# Patient Record
Sex: FEMALE | Race: WHITE | NOT HISPANIC OR LATINO | Employment: FULL TIME | URBAN - METROPOLITAN AREA
[De-identification: names, ages, dates, MRNs, and addresses within clinical notes are randomized per-mention and may not be internally consistent; named-entity substitution may affect disease eponyms.]

---

## 2017-08-08 ENCOUNTER — ALLSCRIPTS OFFICE VISIT (OUTPATIENT)
Dept: OTHER | Facility: OTHER | Age: 27
End: 2017-08-08

## 2017-08-08 ENCOUNTER — TRANSCRIBE ORDERS (OUTPATIENT)
Dept: LAB | Facility: CLINIC | Age: 27
End: 2017-08-08

## 2017-08-08 ENCOUNTER — APPOINTMENT (OUTPATIENT)
Dept: LAB | Facility: CLINIC | Age: 27
End: 2017-08-08
Payer: COMMERCIAL

## 2017-08-08 DIAGNOSIS — Z01.84 IMMUNITY STATUS TESTING: Primary | ICD-10-CM

## 2017-08-08 DIAGNOSIS — Z01.84 ENCOUNTER FOR ANTIBODY RESPONSE EXAMINATION: ICD-10-CM

## 2017-08-08 DIAGNOSIS — N92.0 EXCESSIVE AND FREQUENT MENSTRUATION WITH REGULAR CYCLE: ICD-10-CM

## 2017-08-08 LAB
ALBUMIN SERPL BCP-MCNC: 3.6 G/DL (ref 3.5–5)
ALP SERPL-CCNC: 79 U/L (ref 46–116)
ALT SERPL W P-5'-P-CCNC: 20 U/L (ref 12–78)
ANION GAP SERPL CALCULATED.3IONS-SCNC: 9 MMOL/L (ref 4–13)
AST SERPL W P-5'-P-CCNC: 12 U/L (ref 5–45)
BACTERIA UR QL AUTO: ABNORMAL /HPF
BASOPHILS # BLD AUTO: 0.05 THOUSANDS/ΜL (ref 0–0.1)
BASOPHILS NFR BLD AUTO: 1 % (ref 0–1)
BILIRUB SERPL-MCNC: 0.22 MG/DL (ref 0.2–1)
BILIRUB UR QL STRIP: NEGATIVE
BUN SERPL-MCNC: 15 MG/DL (ref 5–25)
CALCIUM SERPL-MCNC: 8.9 MG/DL (ref 8.3–10.1)
CHLORIDE SERPL-SCNC: 106 MMOL/L (ref 100–108)
CLARITY UR: ABNORMAL
CO2 SERPL-SCNC: 23 MMOL/L (ref 21–32)
COLOR UR: YELLOW
CREAT SERPL-MCNC: 0.84 MG/DL (ref 0.6–1.3)
EOSINOPHIL # BLD AUTO: 0.15 THOUSAND/ΜL (ref 0–0.61)
EOSINOPHIL NFR BLD AUTO: 3 % (ref 0–6)
ERYTHROCYTE [DISTWIDTH] IN BLOOD BY AUTOMATED COUNT: 12.6 % (ref 11.6–15.1)
GFR SERPL CREATININE-BSD FRML MDRD: 98 ML/MIN/1.73SQ M
GLUCOSE SERPL-MCNC: 81 MG/DL (ref 65–140)
GLUCOSE UR STRIP-MCNC: NEGATIVE MG/DL
HCT VFR BLD AUTO: 37.5 % (ref 34.8–46.1)
HGB BLD-MCNC: 12.4 G/DL (ref 11.5–15.4)
HGB UR QL STRIP.AUTO: ABNORMAL
HYALINE CASTS #/AREA URNS LPF: ABNORMAL /LPF
KETONES UR STRIP-MCNC: NEGATIVE MG/DL
LEUKOCYTE ESTERASE UR QL STRIP: NEGATIVE
LYMPHOCYTES # BLD AUTO: 2.07 THOUSANDS/ΜL (ref 0.6–4.47)
LYMPHOCYTES NFR BLD AUTO: 34 % (ref 14–44)
MCH RBC QN AUTO: 29.9 PG (ref 26.8–34.3)
MCHC RBC AUTO-ENTMCNC: 33.1 G/DL (ref 31.4–37.4)
MCV RBC AUTO: 90 FL (ref 82–98)
MONOCYTES # BLD AUTO: 0.36 THOUSAND/ΜL (ref 0.17–1.22)
MONOCYTES NFR BLD AUTO: 6 % (ref 4–12)
NEUTROPHILS # BLD AUTO: 3.41 THOUSANDS/ΜL (ref 1.85–7.62)
NEUTS SEG NFR BLD AUTO: 56 % (ref 43–75)
NITRITE UR QL STRIP: NEGATIVE
NON-SQ EPI CELLS URNS QL MICRO: ABNORMAL /HPF
NRBC BLD AUTO-RTO: 0 /100 WBCS
PH UR STRIP.AUTO: 5.5 [PH] (ref 4.5–8)
PLATELET # BLD AUTO: 329 THOUSANDS/UL (ref 149–390)
PMV BLD AUTO: 10.8 FL (ref 8.9–12.7)
POTASSIUM SERPL-SCNC: 3.9 MMOL/L (ref 3.5–5.3)
PROT SERPL-MCNC: 7.8 G/DL (ref 6.4–8.2)
PROT UR STRIP-MCNC: NEGATIVE MG/DL
RBC # BLD AUTO: 4.15 MILLION/UL (ref 3.81–5.12)
RBC #/AREA URNS AUTO: ABNORMAL /HPF
RUBV IGG SERPL IA-ACNC: 64 IU/ML
SODIUM SERPL-SCNC: 138 MMOL/L (ref 136–145)
SP GR UR STRIP.AUTO: 1.02 (ref 1–1.03)
TSH SERPL DL<=0.05 MIU/L-ACNC: 1.47 UIU/ML (ref 0.36–3.74)
UROBILINOGEN UR QL STRIP.AUTO: 0.2 E.U./DL
WBC # BLD AUTO: 6.05 THOUSAND/UL (ref 4.31–10.16)
WBC #/AREA URNS AUTO: ABNORMAL /HPF

## 2017-08-08 PROCEDURE — 86706 HEP B SURFACE ANTIBODY: CPT

## 2017-08-08 PROCEDURE — 86735 MUMPS ANTIBODY: CPT

## 2017-08-08 PROCEDURE — 80053 COMPREHEN METABOLIC PANEL: CPT

## 2017-08-08 PROCEDURE — 86765 RUBEOLA ANTIBODY: CPT

## 2017-08-08 PROCEDURE — 86787 VARICELLA-ZOSTER ANTIBODY: CPT

## 2017-08-08 PROCEDURE — 81001 URINALYSIS AUTO W/SCOPE: CPT

## 2017-08-08 PROCEDURE — 87340 HEPATITIS B SURFACE AG IA: CPT

## 2017-08-08 PROCEDURE — 86762 RUBELLA ANTIBODY: CPT

## 2017-08-08 PROCEDURE — 84443 ASSAY THYROID STIM HORMONE: CPT

## 2017-08-08 PROCEDURE — 36415 COLL VENOUS BLD VENIPUNCTURE: CPT

## 2017-08-08 PROCEDURE — 86803 HEPATITIS C AB TEST: CPT

## 2017-08-08 PROCEDURE — 85025 COMPLETE CBC W/AUTO DIFF WBC: CPT

## 2017-08-09 LAB
HBV SURFACE AB SER-ACNC: >1000 MIU/ML
HBV SURFACE AG SER QL: NORMAL
HCV AB SER QL: NORMAL

## 2017-08-10 LAB
MEV IGG SER QL: NORMAL
MUV IGG SER QL: ABNORMAL
VZV IGG SER IA-ACNC: NORMAL

## 2017-08-15 ENCOUNTER — ALLSCRIPTS OFFICE VISIT (OUTPATIENT)
Dept: OTHER | Facility: OTHER | Age: 27
End: 2017-08-15

## 2017-09-14 ENCOUNTER — GENERIC CONVERSION - ENCOUNTER (OUTPATIENT)
Dept: OTHER | Facility: OTHER | Age: 27
End: 2017-09-14

## 2017-11-17 ENCOUNTER — OFFICE VISIT (OUTPATIENT)
Dept: URGENT CARE | Facility: CLINIC | Age: 27
End: 2017-11-17
Payer: COMMERCIAL

## 2017-11-17 ENCOUNTER — APPOINTMENT (OUTPATIENT)
Dept: RADIOLOGY | Facility: CLINIC | Age: 27
End: 2017-11-17
Payer: COMMERCIAL

## 2017-11-17 DIAGNOSIS — S99.912A INJURY OF LEFT ANKLE: ICD-10-CM

## 2017-11-17 PROCEDURE — G0383 LEV 4 HOSP TYPE B ED VISIT: HCPCS

## 2017-11-17 PROCEDURE — 73610 X-RAY EXAM OF ANKLE: CPT

## 2017-11-17 PROCEDURE — 99284 EMERGENCY DEPT VISIT MOD MDM: CPT

## 2017-11-23 NOTE — PROGRESS NOTES
Assessment    1  Left ankle sprain (845 00) (S96 [id number])    Plan  Injury of left ankle, initial encounter    · * XR ANKLE 3+ VIEW LEFT; Status:Active - Retrospective By Protocol Authorization; Requested for:[id number];     Discussion/Summary  Discussion Summary:   Patient placed in Ace wrap at and air cast to her left ankle  Given crutches to be nonweightbearing for the next couple of days  Start weight-bearing as tolerated  If unable to bear weight by the end of the week, follow up with foot surgeon  Elevate and ice as much as possible  Can take ibuprofen for pain and inflammation  Medication Side Effects Reviewed: Possible side effects of new medications were reviewed with the patient/guardian today  Understands and agrees with treatment plan: The treatment plan was reviewed with the patient/guardian  The patient/guardian understands and agrees with the treatment plan   Follow Up Instructions: Follow Up with your Primary Care Provider in 7 days  If your symptoms worsen, go to the nearest [place] [address] Emergency Department  Chief Complaint    1  Ankle Pain  Chief Complaint Free Text Note Form: Here with mother due to injury to left ankle today; missed last step and overturned ankle ; c/o pain and swelling; H/o of previous surgery with titanium plug in place  History of Present Illness  HPI: 27-year-old female here after twisting her left ankle today  She missed the last step and overturned right ankle  Has pain and swelling  History prior surgery and has hardware medially and laterally  Has decreased range of motion  Hospital Based Practices Required Assessment:  Pain Assessment  the patient states they have pain  The pain is located in the ankle  (on a scale of 0 to 10, the patient rates the pain at 5, at times ranging as high as 10 )  Abuse And Domestic Violence Screen   Domestic violence screen not done today   Reason DV Screen not done: mother present    Ankle Pain: CANDE EPPS presents with complaints of ankle pain  Associated symptoms include swelling,-- stiffness,-- decreased range of motion-- and-- difficulty bearing weight, but-- no redness,-- no warmth,-- no localized bruising,-- no instability,-- no fever,-- no chills-- and-- no lateral foot pain  Review of Systems  Focused-Female:  Musculoskeletal: as noted in HPI  Neurological: no complaints of headache, no confusion, no numbness or tingling, no dizziness or fainting  ROS Reviewed:   ROS reviewed  Active Problems  1  Encounter for hearing test (V72 19) (Z01 10)   2  Immunity status testing (V72 61) (Z01 84)   3  Irritable bowel syndrome with both constipation and diarrhea (564 1) (K58 2)   4  Menorrhagia (626 2) (N92 0)   5  Need for tuberculosis vaccination (V03 2) (Z23)   6  Vision test (V72 0) (Z01 00)    Past Medical History    1  History of Encounter for fetal anatomic survey (V28 81) (Z36 89)   2  History of Encounter for PPD test (V74 1) (Z11 1)   3  History of abdominal pain (V13 89) (Z87 898)   4  History of irritable bowel syndrome (V12 79) (Z87 19)   5  History of urinary tract infection (V13 02) (Z87 440)   6  History of Maternal obesity, antepartum (649 13,278 00) (O99 210)   7  History of Nausea (787 02) (R11 0)   8  History of Skin rash (782 1) (R21)   9  History of URTI (acute upper respiratory infection) (465 9) (J06 9)  Active Problems And Past Medical History Reviewed: The active problems and past medical history were reviewed and updated today  Family History  Mother    1  Family history of irritable bowel syndrome (V18 59) (Z83 79)  Grandmother    2  Family history of malignant neoplasm of ovary (V16 41) (Z80 41)  Aunt    3  Family history of malignant neoplasm of ovary (V16 41) (Z80 41)  Family History Reviewed: The family history was reviewed and updated today  Social History   · Never A Smoker  Social History Reviewed: The social history was reviewed and updated today   The social history was reviewed and is unchanged  Surgical History    1  History of  Section   2  History of Foot Surgery  Surgical History Reviewed: The surgical history was reviewed and updated today  Current Meds   1  Dicyclomine HCl - 10 MG Oral Capsule; TAKE 1 CAPSULE EVERY 6 HOURS DAILY; Therapy: [id number] to (Willy Iglesiasjennifer)  Requested for: [id number]; Last Rx:[id number] Ordered  Medication List Reviewed: The medication list was reviewed and updated today  Allergies  1  Penicillins    2  No Known Environmental Allergies   3  No Known Food Allergies    Vitals  Signs   Recorded: 90ESJ0299 01:44PM   Temperature: 99 3 F  Heart Rate: 89  Respiration: 18  Systolic: 253  Diastolic: 72  Height: 5 ft 4 in  Weight: 185 lb   BMI Calculated: 31 76  BSA Calculated: 1 89  O2 Saturation: 98  Pain Scale: 10    Physical Exam   Constitutional  General appearance: No acute distress, well appearing and well nourished  Musculoskeletal  Gait and station: Abnormal   Gait evaluation demonstrated non-weight bearing on the left  -- Left ankle with limited range of motion in all planes  Tender to palpation along lateral aspect of lateral malleolus and over ATF  Noticable swelling over lateral aspect of ankle  sensation intact        Signatures   Electronically signed by : Zane Ruiz, [facility]; 2017  2:23PM EST                       (Author)    Electronically signed by : ИРИНА De La Rosa ; 2017  1:25PM EST                       (Co-author)

## 2018-01-10 NOTE — PROGRESS NOTES
Chief Complaint  ppd      Active Problems    1  Encounter for hearing test (V72 19) (Z01 10)   2  Immunity status testing (V72 61) (Z01 84)   3  Menorrhagia (626 2) (N92 0)   4  Need for tuberculosis vaccination (V03 2) (Z23)   5  Vision test (V72 0) (Z01 00)    Current Meds   1  No Reported Medications Recorded    Allergies    1  Penicillins    2  No Known Environmental Allergies   3   No Known Food Allergies    Plan  Need for tuberculosis vaccination    · PPD    Signatures   Electronically signed by : [facility], ; Aug 15 2017  6:05PM EST                       (Author)    Electronically signed by : ELHAM Alarcon ; Aug 15 2017  8:11PM EST                       (Co-author)

## 2018-01-14 VITALS
SYSTOLIC BLOOD PRESSURE: 118 MMHG | HEART RATE: 63 BPM | TEMPERATURE: 98.3 F | BODY MASS INDEX: 31.81 KG/M2 | RESPIRATION RATE: 18 BRPM | OXYGEN SATURATION: 98 % | HEIGHT: 64 IN | DIASTOLIC BLOOD PRESSURE: 72 MMHG | WEIGHT: 186.31 LBS

## 2018-01-22 VITALS
TEMPERATURE: 98.5 F | DIASTOLIC BLOOD PRESSURE: 74 MMHG | BODY MASS INDEX: 31.58 KG/M2 | RESPIRATION RATE: 18 BRPM | HEIGHT: 64 IN | WEIGHT: 185 LBS | HEART RATE: 97 BPM | SYSTOLIC BLOOD PRESSURE: 116 MMHG | OXYGEN SATURATION: 98 %

## 2018-04-16 ENCOUNTER — TRANSCRIBE ORDERS (OUTPATIENT)
Dept: LAB | Facility: CLINIC | Age: 28
End: 2018-04-16

## 2018-04-16 ENCOUNTER — LAB (OUTPATIENT)
Dept: LAB | Facility: CLINIC | Age: 28
End: 2018-04-16
Payer: COMMERCIAL

## 2018-04-16 ENCOUNTER — OFFICE VISIT (OUTPATIENT)
Dept: INTERNAL MEDICINE CLINIC | Facility: CLINIC | Age: 28
End: 2018-04-16
Payer: COMMERCIAL

## 2018-04-16 VITALS
HEIGHT: 65 IN | DIASTOLIC BLOOD PRESSURE: 90 MMHG | OXYGEN SATURATION: 98 % | BODY MASS INDEX: 32.86 KG/M2 | SYSTOLIC BLOOD PRESSURE: 120 MMHG | WEIGHT: 197.2 LBS | HEART RATE: 64 BPM | TEMPERATURE: 98.4 F | RESPIRATION RATE: 16 BRPM

## 2018-04-16 DIAGNOSIS — F41.9 ANXIETY: Primary | ICD-10-CM

## 2018-04-16 DIAGNOSIS — F32.0 CURRENT MILD EPISODE OF MAJOR DEPRESSIVE DISORDER WITHOUT PRIOR EPISODE (HCC): ICD-10-CM

## 2018-04-16 DIAGNOSIS — F41.9 ANXIETY: ICD-10-CM

## 2018-04-16 LAB
ALBUMIN SERPL BCP-MCNC: 3.7 G/DL (ref 3.5–5)
ALP SERPL-CCNC: 82 U/L (ref 46–116)
ALT SERPL W P-5'-P-CCNC: 18 U/L (ref 12–78)
ANION GAP SERPL CALCULATED.3IONS-SCNC: 10 MMOL/L (ref 4–13)
AST SERPL W P-5'-P-CCNC: 10 U/L (ref 5–45)
BASOPHILS # BLD AUTO: 0.03 THOUSANDS/ΜL (ref 0–0.1)
BASOPHILS NFR BLD AUTO: 0 % (ref 0–1)
BILIRUB SERPL-MCNC: 0.18 MG/DL (ref 0.2–1)
BUN SERPL-MCNC: 19 MG/DL (ref 5–25)
CALCIUM SERPL-MCNC: 8.7 MG/DL (ref 8.3–10.1)
CHLORIDE SERPL-SCNC: 108 MMOL/L (ref 100–108)
CO2 SERPL-SCNC: 22 MMOL/L (ref 21–32)
CREAT SERPL-MCNC: 0.7 MG/DL (ref 0.6–1.3)
EOSINOPHIL # BLD AUTO: 0.24 THOUSAND/ΜL (ref 0–0.61)
EOSINOPHIL NFR BLD AUTO: 3 % (ref 0–6)
ERYTHROCYTE [DISTWIDTH] IN BLOOD BY AUTOMATED COUNT: 12.2 % (ref 11.6–15.1)
GFR SERPL CREATININE-BSD FRML MDRD: 121 ML/MIN/1.73SQ M
GLUCOSE P FAST SERPL-MCNC: 95 MG/DL (ref 65–99)
HCT VFR BLD AUTO: 39.2 % (ref 34.8–46.1)
HGB BLD-MCNC: 13 G/DL (ref 11.5–15.4)
LYMPHOCYTES # BLD AUTO: 2.71 THOUSANDS/ΜL (ref 0.6–4.47)
LYMPHOCYTES NFR BLD AUTO: 31 % (ref 14–44)
MCH RBC QN AUTO: 29.7 PG (ref 26.8–34.3)
MCHC RBC AUTO-ENTMCNC: 33.2 G/DL (ref 31.4–37.4)
MCV RBC AUTO: 90 FL (ref 82–98)
MONOCYTES # BLD AUTO: 0.47 THOUSAND/ΜL (ref 0.17–1.22)
MONOCYTES NFR BLD AUTO: 5 % (ref 4–12)
NEUTROPHILS # BLD AUTO: 5.37 THOUSANDS/ΜL (ref 1.85–7.62)
NEUTS SEG NFR BLD AUTO: 61 % (ref 43–75)
NRBC BLD AUTO-RTO: 0 /100 WBCS
PLATELET # BLD AUTO: 335 THOUSANDS/UL (ref 149–390)
PMV BLD AUTO: 10.6 FL (ref 8.9–12.7)
POTASSIUM SERPL-SCNC: 3.9 MMOL/L (ref 3.5–5.3)
PROT SERPL-MCNC: 8 G/DL (ref 6.4–8.2)
RBC # BLD AUTO: 4.37 MILLION/UL (ref 3.81–5.12)
SODIUM SERPL-SCNC: 140 MMOL/L (ref 136–145)
TSH SERPL DL<=0.05 MIU/L-ACNC: 1.75 UIU/ML (ref 0.36–3.74)
WBC # BLD AUTO: 8.84 THOUSAND/UL (ref 4.31–10.16)

## 2018-04-16 PROCEDURE — 84443 ASSAY THYROID STIM HORMONE: CPT

## 2018-04-16 PROCEDURE — 99214 OFFICE O/P EST MOD 30 MIN: CPT | Performed by: NURSE PRACTITIONER

## 2018-04-16 PROCEDURE — 85025 COMPLETE CBC W/AUTO DIFF WBC: CPT

## 2018-04-16 PROCEDURE — 80053 COMPREHEN METABOLIC PANEL: CPT

## 2018-04-16 PROCEDURE — 36415 COLL VENOUS BLD VENIPUNCTURE: CPT

## 2018-04-16 RX ORDER — DICYCLOMINE HCL 20 MG
20 TABLET ORAL
COMMUNITY
Start: 2017-09-08 | End: 2019-05-29

## 2018-04-16 RX ORDER — ESCITALOPRAM OXALATE 10 MG/1
10 TABLET ORAL DAILY
Qty: 30 TABLET | Refills: 0 | Status: SHIPPED | OUTPATIENT
Start: 2018-04-16 | End: 2018-05-14 | Stop reason: DRUGHIGH

## 2018-04-16 NOTE — PROGRESS NOTES
Assessment/Plan:  Patient to start on Lexapro 10 mg daily  She was instructed the side effects and was told if any vomiting or suicidal thoughts to call the office  She was advised to practice safe sex and to avoid getting pregnant while on this medication  Will ordering fasting labs to have done today  Will bring back in one month  Patient does contract for safety today in the office  No problem-specific Assessment & Plan notes found for this encounter  Problem List Items Addressed This Visit     Anxiety - Primary    Relevant Medications    escitalopram (LEXAPRO) 10 mg tablet    Other Relevant Orders    Comprehensive metabolic panel    CBC and differential    TSH, 3rd generation with T4 reflex    Current mild episode of major depressive disorder without prior episode (HCC)    Relevant Medications    escitalopram (LEXAPRO) 10 mg tablet    Other Relevant Orders    Comprehensive metabolic panel    CBC and differential    TSH, 3rd generation with T4 reflex            Subjective:      Patient ID: Milton Castle is a 24 y o  female  Greg Bernabet is here today for an acute visit  She states for the past several months she has been having episodes of anxiety and having depression  She states some days she can not get out of bed and just wants to sleep all day  She does have occasional palpitations but denies any chest pain or SOB  She states she is no longer with her children's father and did have her best friend and Grandmother pass away all within in the past 6 months  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 and she feels with the stress of school and her home life it is time to get some help  She is not planning on having any children anytime soon and is not breastfeeding  She denies any suicidal or homicidal ideations  She is eating and drinking but some days she does go without eating due to being in bed not wanting to get up  She offers no other concerns today           The following portions of the patient's history were reviewed and updated as appropriate:   She  has a past medical history of Irritable bowel syndrome and Maternal obesity, antepartum  She   Patient Active Problem List    Diagnosis Date Noted    Anxiety 2018    Current mild episode of major depressive disorder without prior episode ([facility] [address] ) 2018     She  has a past surgical history that includes  section and Foot surgery  Her family history includes Irritable bowel syndrome in her mother; Ovarian cancer in her family and family  She  reports that she has never smoked  She has never used smokeless tobacco  She reports that she does not drink alcohol or use drugs  Current Outpatient Prescriptions   Medication Sig Dispense Refill    dicyclomine (BENTYL) 20 mg tablet Take 20 mg by mouth      escitalopram (LEXAPRO) 10 mg tablet Take 1 tablet (10 mg total) by mouth daily 30 tablet 0     No current facility-administered medications for this visit  No current outpatient prescriptions on file prior to visit  No current facility-administered medications on file prior to visit  She is allergic to penicillin g     Review of Systems   Constitutional: Negative  HENT: Negative  Eyes: Negative  Respiratory: Negative  Cardiovascular: Negative  Gastrointestinal: Negative  Endocrine: Negative  Genitourinary: Negative  Musculoskeletal: Negative  Skin: Negative  Allergic/Immunologic: Negative  Neurological: Negative  Hematological: Negative  Psychiatric/Behavioral: The patient is nervous/anxious  Objective:      /90 (BP Location: Right arm, Patient Position: Sitting, Cuff Size: Standard)   Pulse 64   Temp 98 4 °F (36 9 °C) (Oral)   Resp 16   Ht 5' 5" (1 651 m)   Wt 89 4 kg (197 lb 3 2 oz)   SpO2 98%   BMI 32 82 kg/m²          Physical Exam   Constitutional: She is oriented to person, place, and time  She appears well-developed and well-nourished     HENT:   Head: Normocephalic and atraumatic  Right Ear: External ear normal    Left Ear: External ear normal    Nose: Nose normal    Mouth/Throat: Oropharynx is clear and moist    Eyes: Conjunctivae and EOM are normal  Pupils are equal, round, and reactive to light  Neck: Normal range of motion  Neck supple  Cardiovascular: Normal rate, regular rhythm, normal heart sounds and intact distal pulses  Pulmonary/Chest: Effort normal and breath sounds normal    Abdominal: Bowel sounds are normal    Musculoskeletal: Normal range of motion  Neurological: She is alert and oriented to person, place, and time  She has normal reflexes  Skin: Skin is warm and dry  Psychiatric: She has a normal mood and affect  Her behavior is normal  Judgment and thought content normal    Vitals reviewed

## 2018-04-16 NOTE — PATIENT INSTRUCTIONS
Anxiety   WHAT YOU NEED TO KNOW:   What do I need to know about anxiety? Anxiety is a condition that causes you to feel extremely worried or nervous  The feelings are so strong that they can cause problems with your daily activities or sleep  Anxiety may be triggered by something you fear, or it may happen without a cause  Family or work stress, smoking, caffeine, and alcohol can increase your risk for anxiety  Certain medicines or health conditions can also increase your risk  Anxiety can become a long-term condition if it is not managed or treated  What other common signs and symptoms may occur with anxiety? · Fatigue or muscle tightness     · Shaking, restlessness, or irritability     · Problems focusing     · Trouble sleeping     · Feeling jumpy, easily startled, or dizzy     · Rapid heartbeat or shortness of breath  What do I need to tell my healthcare provider about my anxiety? Tell your healthcare provider when your symptoms began and what triggers them  Tell your provider if anxiety affects your daily activities  Your provider will also ask about your medical history and if you have family members with a similar condition  Tell your provider about your past and present alcohol, nicotine, or drug use  What can I do to manage anxiety? You may get medicines to help you feel calm and relaxed, and to decrease your symptoms  Medicines are usually given together with therapy or other treatments  The following can help you manage anxiety:  · Talk to someone about your anxiety  Your healthcare provider may suggest counseling  Cognitive behavioral therapy can help you understand and change how you react to events that trigger your symptoms  You might feel more comfortable talking with a friend or family member about your anxiety  Choose someone you know will be supportive and encouraging  · Find ways to relax  Activities such as exercise, meditation, or listening to music can help you relax   Spend time with friends, or do things you enjoy  · Practice deep breathing  Deep breathing can help you relax when you feel anxious  Focus on taking slow, deep breaths several times a day, or during an anxiety attack  Breathe in through your nose and out through your mouth  · Create a regular sleep routine  Regular sleep can help you feel calmer during the day  Go to sleep and wake up at the same times every day  Do not watch television or use the computer right before bed  Your room should be comfortable, dark, and quiet  · Eat a variety of healthy foods  Healthy foods include fruits, vegetables, low-fat dairy products, lean meats, fish, whole-grain breads, and cooked beans  Healthy foods can help you feel less anxious and have more energy  · Exercise regularly  Exercise can increase your energy level  Exercise may also lift your mood and help you sleep better  Your healthcare provider can help you create an exercise plan  · Do not smoke  Nicotine and other chemicals in cigarettes and cigars can increase anxiety  Ask your healthcare provider for information if you currently smoke and need help to quit  E-cigarettes or smokeless tobacco still contain nicotine  Talk to your healthcare provider before you use these products  · Do not have caffeine  Caffeine can make your symptoms worse  Do not have foods or drinks that are meant to increase your energy level  · Limit or do not drink alcohol  Ask your healthcare provider if alcohol is safe for you  You may not be able to drink alcohol if you take certain anxiety or depression medicines  Limit alcohol to 1 drink per day if you are a woman  Limit alcohol to 2 drinks per day if you are a man  A drink of alcohol is 12 ounces of beer, 5 ounces of wine, or 1½ ounces of liquor  · Do not use drugs  Drugs can make your anxiety worse  It can also make anxiety hard to manage  Talk to your healthcare provider if you use drugs and want help to quit    Call 911 if:   · You have chest pain, tightness, or heaviness that may spread to your shoulders, arms, jaw, neck, or back  · You feel like hurting yourself or someone else  When should I contact my healthcare provider? · Your symptoms get worse or do not get better with treatment  · Your anxiety keeps you from doing your regular daily activities  · You have new symptoms since your last visit  · You have questions or concerns about your condition or care  CARE AGREEMENT:   You have the right to help plan your care  Learn about your health condition and how it may be treated  Discuss treatment options with your caregivers to decide what care you want to receive  You always have the right to refuse treatment  The above information is an  only  It is not intended as medical advice for individual conditions or treatments  Talk to your doctor, nurse or pharmacist before following any medical regimen to see if it is safe and effective for you  © 2017 [employer] Information is for End User's use only and may not be sold, redistributed or otherwise used for commercial purposes  All illustrations and images included in CareNotes® are the copyrighted property of A D A M [employer] Inc  or [employer] Gil

## 2018-05-14 ENCOUNTER — OFFICE VISIT (OUTPATIENT)
Dept: INTERNAL MEDICINE CLINIC | Facility: CLINIC | Age: 28
End: 2018-05-14
Payer: COMMERCIAL

## 2018-05-14 VITALS
HEIGHT: 65 IN | SYSTOLIC BLOOD PRESSURE: 116 MMHG | DIASTOLIC BLOOD PRESSURE: 86 MMHG | HEART RATE: 91 BPM | TEMPERATURE: 98 F | BODY MASS INDEX: 32.84 KG/M2 | OXYGEN SATURATION: 97 % | WEIGHT: 197.1 LBS

## 2018-05-14 DIAGNOSIS — F32.0 CURRENT MILD EPISODE OF MAJOR DEPRESSIVE DISORDER WITHOUT PRIOR EPISODE (HCC): Primary | ICD-10-CM

## 2018-05-14 PROBLEM — K58.2 IRRITABLE BOWEL SYNDROME WITH BOTH CONSTIPATION AND DIARRHEA: Status: ACTIVE | Noted: 2017-09-14

## 2018-05-14 PROCEDURE — 99213 OFFICE O/P EST LOW 20 MIN: CPT | Performed by: NURSE PRACTITIONER

## 2018-05-14 RX ORDER — ESCITALOPRAM OXALATE 20 MG/1
20 TABLET ORAL DAILY
Qty: 30 TABLET | Refills: 3 | Status: SHIPPED | OUTPATIENT
Start: 2018-05-14 | End: 2018-08-01 | Stop reason: DRUGHIGH

## 2018-05-14 NOTE — PROGRESS NOTES
Assessment/Plan: Will increase her Lexapro to 20 mg daily  She was advised if any issues while taking this to call the office  She was advised to try taking the medication at night rather than during the day  Patient will call the office if any issues or if she feels the medication is not working  No problem-specific Assessment & Plan notes found for this encounter  Problem List Items Addressed This Visit     Current mild episode of major depressive disorder without prior episode ([facility] ) - Primary    Relevant Medications    escitalopram (LEXAPRO) 20 mg tablet            Subjective:      Patient ID: Darrian Friend is a 24 y o  female  Brook Ennis is here today for a follow up visit  She was started on Lexapro on  and has been taking 10 mg daily  She states she is feeling a little better but is still having some symptoms of feeling tired during the day and is not sleeping at night  She feel her insomnia is related to her schooling and stress  She states she would like to continue her medication but would like to increase the dose to 20 mg  She denies any nausea, vomiting, or diarrhea  She denies any suicidal thoughts or homicidal thoughts  She offers no other complaints today  The following portions of the patient's history were reviewed and updated as appropriate:   She  has a past medical history of Irritable bowel syndrome and Maternal obesity, antepartum  She   Patient Active Problem List    Diagnosis Date Noted    Anxiety 2018    Current mild episode of major depressive disorder without prior episode ([facility] ) 2018    Irritable bowel syndrome with both constipation and diarrhea 2017     She  has a past surgical history that includes  section and Foot surgery  Her family history includes Irritable bowel syndrome in her mother; Ovarian cancer in her family and family  She  reports that she has never smoked   She has never used smokeless tobacco  She reports that she does not drink alcohol or use drugs  Current Outpatient Prescriptions   Medication Sig Dispense Refill    dicyclomine (BENTYL) 20 mg tablet Take 20 mg by mouth      escitalopram (LEXAPRO) 20 mg tablet Take 1 tablet (20 mg total) by mouth daily 30 tablet 3     No current facility-administered medications for this visit  Current Outpatient Prescriptions on File Prior to Visit   Medication Sig    dicyclomine (BENTYL) 20 mg tablet Take 20 mg by mouth    [DISCONTINUED] escitalopram (LEXAPRO) 10 mg tablet Take 1 tablet (10 mg total) by mouth daily     No current facility-administered medications on file prior to visit  She is allergic to penicillin g     Review of Systems   All other systems reviewed and are negative  Objective:      /86 (BP Location: Right arm, Patient Position: Sitting, Cuff Size: Adult)   Pulse 91   Temp 98 °F (36 7 °C) (Temporal)   Ht 5' 5" (1 651 m)   Wt 89 4 kg (197 lb 1 6 oz)   SpO2 97%   BMI 32 80 kg/m²          Physical Exam   Constitutional: She is oriented to person, place, and time  She appears well-developed and well-nourished  HENT:   Head: Normocephalic and atraumatic  Right Ear: External ear normal    Left Ear: External ear normal    Nose: Nose normal    Mouth/Throat: Oropharynx is clear and moist    Eyes: Conjunctivae and EOM are normal  Pupils are equal, round, and reactive to light  Neck: Normal range of motion  Neck supple  Cardiovascular: Normal rate, regular rhythm, normal heart sounds and intact distal pulses  Pulmonary/Chest: Effort normal and breath sounds normal    Abdominal: Soft  Bowel sounds are normal    Musculoskeletal: Normal range of motion  Neurological: She is alert and oriented to person, place, and time  She has normal reflexes  Skin: Skin is warm and dry  Psychiatric: She has a normal mood and affect  Her behavior is normal  Judgment and thought content normal    Vitals reviewed

## 2018-08-01 ENCOUNTER — OFFICE VISIT (OUTPATIENT)
Dept: INTERNAL MEDICINE CLINIC | Facility: CLINIC | Age: 28
End: 2018-08-01
Payer: COMMERCIAL

## 2018-08-01 VITALS
SYSTOLIC BLOOD PRESSURE: 112 MMHG | DIASTOLIC BLOOD PRESSURE: 78 MMHG | HEART RATE: 89 BPM | OXYGEN SATURATION: 98 % | HEIGHT: 65 IN | BODY MASS INDEX: 33.14 KG/M2 | TEMPERATURE: 98.9 F | WEIGHT: 198.9 LBS

## 2018-08-01 DIAGNOSIS — F41.9 ANXIETY AND DEPRESSION: Primary | ICD-10-CM

## 2018-08-01 DIAGNOSIS — L74.9 SWEATING ABNORMALITY: ICD-10-CM

## 2018-08-01 DIAGNOSIS — F32.A ANXIETY AND DEPRESSION: Primary | ICD-10-CM

## 2018-08-01 PROCEDURE — 3008F BODY MASS INDEX DOCD: CPT | Performed by: NURSE PRACTITIONER

## 2018-08-01 PROCEDURE — 99214 OFFICE O/P EST MOD 30 MIN: CPT | Performed by: NURSE PRACTITIONER

## 2018-08-01 NOTE — PATIENT INSTRUCTIONS
Sertraline (By mouth)   Sertraline (SER-tra-james)  Treats depression, obsessive-compulsive disorder (OCD), posttraumatic stress disorder (PTSD), premenstrual dysphoric disorder (PMDD), social anxiety disorder, and panic disorder  This medicine is an SSRI  Brand Name(s): Zoloft   There may be other brand names for this medicine  When This Medicine Should Not Be Used: This medicine is not right for everyone  Do not use it if you had an allergic reaction to sertraline  How to Use This Medicine:   Liquid, Tablet  · Take your medicine as directed  Your dose may need to be changed several times to find what works best for you  You may need to take it for a few weeks or months before you feel better  · Oral liquid: Use the dropper provided to remove the medicine and mix it with 1/2 cup (4 ounces) of water, ginger ale, lemon-lime soda, lemonade, or orange juice  Drink the mixture right away  It is normal for it to look a bit hazy  · This medicine should come with a Medication Guide  Ask your pharmacist for a copy if you do not have one  · Missed dose: Take a dose as soon as you remember  If it is almost time for your next dose, wait until then and take a regular dose  Do not take extra medicine to make up for a missed dose  · Store the medicine in a closed container at room temperature, away from heat, moisture, and direct light  Drugs and Foods to Avoid:   Ask your doctor or pharmacist before using any other medicine, including over-the-counter medicines, vitamins, and herbal products  · Do not use this medicine together with pimozide  Do not use this medicine and an MAO inhibitor (MAOI) within 14 days of each other  Do not use the oral liquid form of sertraline if you are also using disulfiram   · Some medicines can affect how sertraline works   Tell your doctor if you are using the following:   ¨ Buspirone, cimetidine, cisapride, diazepam, digitoxin, fentanyl, flecainide, lithium, phenytoin, propafenone, [place] Bruce's wort, tramadol, tryptophan supplements, or valproate  ¨ A blood thinner (such as warfarin), a diuretic (water pill), an NSAID pain or arthritis medicine (such as aspirin, diclofenac, ibuprofen), a tricyclic antidepressant, a triptan medicine for migraine headaches  · Do not drink alcohol while you are using this medicine  Warnings While Using This Medicine:   · Tell your doctor if you are pregnant or breastfeeding, or if you have liver disease, bleeding problems, glaucoma, heart disease, or a seizure disorder  · For some children, teenagers, and young adults, this medicine may increase mental or emotional problems  This may lead to thoughts of suicide and violence  Talk with your doctor right away if you have any thoughts or behavior changes that concern you  Tell your doctor if you or anyone in your family has a history of bipolar disorder or suicide attempts  · This medicine may cause the following problems:   ¨ Serotonin syndrome (when taken with certain medicines)  ¨ Low sodium levels (more common in elderly patients and those who take diuretics or become dehydrated)  · Tell your doctor if you are sensitive to latex, because the oral liquid comes with a latex rubber dropper  · This medicine may make you dizzy or drowsy  Do not drive or do anything that could be dangerous until you know how this medicine affects you  · Do not stop using this medicine suddenly  Your doctor will need to slowly decrease your dose before you stop it completely  · Your doctor will check your progress and the effects of this medicine at regular visits  Keep all appointments  · Keep all medicine out of the reach of children  Never share your medicine with anyone    Possible Side Effects While Using This Medicine:   Call your doctor right away if you notice any of these side effects:  · Allergic reaction: Itching or hives, swelling in your face or hands, swelling or tingling in your mouth or throat, chest tightness, trouble breathing  · Anxiety, restlessness, fast heartbeat, fever, sweating, muscle spasms, twitching, nausea, vomiting, diarrhea, seeing or hearing things that are not there  · Blistering, peeling, or red skin rash  · Confusion, weakness, and muscle twitching  · Eye pain, vision changes, seeing halos around lights  · Feeling more excited or energetic than usual  · Thoughts of hurting yourself or others, unusual behavior  · Unusual bleeding or bruising  If you notice these less serious side effects, talk with your doctor:   · Dry mouth  · Loss of appetite, weight loss  · Mild diarrhea, constipation, nausea, vomiting  · Sexual problems  · Sleepiness, or trouble sleeping  If you notice other side effects that you think are caused by this medicine, tell your doctor  Call your doctor for medical advice about side effects  You may report side effects to FDA at 2-557-FDA-1845  © 2017 [address] [employer] Information is for End User's use only and may not be sold, redistributed or otherwise used for commercial purposes  The above information is an  only  It is not intended as medical advice for individual conditions or treatments  Talk to your doctor, nurse or pharmacist before following any medical regimen to see if it is safe and effective for you

## 2018-08-01 NOTE — PROGRESS NOTES
Assessment/Plan: Patient was advised how to wean off Lexapro  Once off will start her on Zoloft 50 mg daily  She contracts for safety today in the office  Will refer her to Psychiatry and [facility]  Will also refer her to Dermatology regarding her profuse sweating  Will bring her back in around 2 months or sooner if need be  No problem-specific Assessment & Plan notes found for this encounter  Problem List Items Addressed This Visit     Anxiety and depression - Primary    Relevant Medications    sertraline (ZOLOFT) 50 mg tablet    Sweating abnormality    Relevant Orders    Ambulatory referral to Dermatology            Subjective:      Patient ID: Emely Dee is a 24 y o  female  Tracy Floyd is here today for an acute visit  She is taking Lexapro 20 mg daily for her anxiety and depression  She states she just recently did graduate from [employer] and she feels her anxiety and depression is getting much worse  She states she will have periods where her heart is racing and she can not shut her mind off  She would like to see Psychiatry and counseling  She also would like to start on something else for her symptoms  She is also having issues with profuse sweating which has been going on since puberty  She states her sweating happens when she is not doing anything and it will be her whole body  She otherwise offers no other complaints  The following portions of the patient's history were reviewed and updated as appropriate:   She  has a past medical history of Irritable bowel syndrome and Maternal obesity, antepartum    She   Patient Active Problem List    Diagnosis Date Noted    Anxiety and depression 08/01/2018    Sweating abnormality 08/01/2018    Anxiety 04/16/2018    Current mild episode of major depressive disorder without prior episode ([facility] [address] ) 04/16/2018    Irritable bowel syndrome with both constipation and diarrhea 09/14/2017     She  has a past surgical history that includes  section and Foot surgery  Her family history includes Irritable bowel syndrome in her mother; Ovarian cancer in her family and family  She  reports that she has never smoked  She has never used smokeless tobacco  She reports that she does not drink alcohol or use drugs  Current Outpatient Prescriptions   Medication Sig Dispense Refill    dicyclomine (BENTYL) 20 mg tablet Take 20 mg by mouth      sertraline (ZOLOFT) 50 mg tablet Take 1 tablet (50 mg total) by mouth daily 30 tablet 3     No current facility-administered medications for this visit  Current Outpatient Prescriptions on File Prior to Visit   Medication Sig    dicyclomine (BENTYL) 20 mg tablet Take 20 mg by mouth    [DISCONTINUED] escitalopram (LEXAPRO) 20 mg tablet Take 1 tablet (20 mg total) by mouth daily     No current facility-administered medications on file prior to visit  She is allergic to penicillin g     Review of Systems   Constitutional: Negative  HENT: Negative  Eyes: Negative  Respiratory: Negative  Gastrointestinal: Negative  Endocrine: Negative  Genitourinary: Negative  Musculoskeletal: Negative  Skin: Negative  Allergic/Immunologic: Negative  Neurological: Negative  Hematological: Negative  Psychiatric/Behavioral: The patient is nervous/anxious  Below is the patient's most recent value for Albumin, ALT, AST, BUN, Calcium, Chloride, Cholesterol, CO2, Creatinine, GFR, Glucose, HDL, Hematocrit, Hemoglobin, Hemoglobin A1C, LDL, Magnesium, Phosphorus, Platelets, Potassium, PSA, Sodium, Triglycerides, and WBC     Lab Results   Component Value Date    ALT 18 2018    AST 10 2018    BUN 19 2018    CALCIUM 8 7 2018     2018    CO2 22 2018    CREATININE 0 70 2018    HCT 39 2 2018    HGB 13 0 2018     2018    K 3 9 2018     2018    WBC 8 84 2018     Note: for a comprehensive list of the patient's lab results, access the Results Review activity  Objective:      /78 (BP Location: Right arm, Patient Position: Sitting, Cuff Size: Adult)   Pulse 89   Temp 98 9 °F (37 2 °C) (Temporal)   Ht 5' 5" (1 651 m)   Wt 90 2 kg (198 lb 14 4 oz)   SpO2 98%   BMI 33 10 kg/m²          Physical Exam   Constitutional: She is oriented to person, place, and time  She appears well-developed and well-nourished  HENT:   Head: Normocephalic and atraumatic  Right Ear: External ear normal    Left Ear: External ear normal    Nose: Nose normal    Mouth/Throat: Oropharynx is clear and moist    Eyes: Conjunctivae and EOM are normal  Pupils are equal, round, and reactive to light  Neck: Normal range of motion  Neck supple  Cardiovascular: Normal rate, regular rhythm, normal heart sounds and intact distal pulses  Pulmonary/Chest: Effort normal and breath sounds normal    Abdominal: Soft  Bowel sounds are normal    Musculoskeletal: Normal range of motion  Neurological: She is alert and oriented to person, place, and time  She has normal reflexes  Skin: Skin is warm and dry  Psychiatric: She has a normal mood and affect  Her behavior is normal  Judgment and thought content normal    Vitals reviewed

## 2018-09-04 ENCOUNTER — TELEPHONE (OUTPATIENT)
Dept: INTERNAL MEDICINE CLINIC | Facility: CLINIC | Age: 28
End: 2018-09-04

## 2018-09-04 NOTE — TELEPHONE ENCOUNTER
No it should not be an issue does she want me to order an HCG blood test to determine if she could possibly be pregnant

## 2018-09-05 DIAGNOSIS — N91.2 AMENORRHEA: Primary | ICD-10-CM

## 2018-10-04 ENCOUNTER — APPOINTMENT (OUTPATIENT)
Dept: LAB | Facility: CLINIC | Age: 28
End: 2018-10-04
Payer: COMMERCIAL

## 2018-10-04 ENCOUNTER — OFFICE VISIT (OUTPATIENT)
Dept: INTERNAL MEDICINE CLINIC | Facility: CLINIC | Age: 28
End: 2018-10-04
Payer: COMMERCIAL

## 2018-10-04 ENCOUNTER — TRANSCRIBE ORDERS (OUTPATIENT)
Dept: LAB | Facility: CLINIC | Age: 28
End: 2018-10-04

## 2018-10-04 VITALS
DIASTOLIC BLOOD PRESSURE: 82 MMHG | HEIGHT: 65 IN | WEIGHT: 191.1 LBS | HEART RATE: 95 BPM | OXYGEN SATURATION: 98 % | SYSTOLIC BLOOD PRESSURE: 120 MMHG | BODY MASS INDEX: 31.84 KG/M2 | TEMPERATURE: 98.7 F

## 2018-10-04 DIAGNOSIS — R61 GENERALIZED HYPERHIDROSIS: ICD-10-CM

## 2018-10-04 DIAGNOSIS — F41.9 ANXIETY AND DEPRESSION: Primary | ICD-10-CM

## 2018-10-04 DIAGNOSIS — R61 HYPERHIDROSIS: ICD-10-CM

## 2018-10-04 DIAGNOSIS — Z23 NEED FOR INFLUENZA VACCINATION: ICD-10-CM

## 2018-10-04 DIAGNOSIS — R61 GENERALIZED HYPERHIDROSIS: Primary | ICD-10-CM

## 2018-10-04 DIAGNOSIS — F32.A ANXIETY AND DEPRESSION: Primary | ICD-10-CM

## 2018-10-04 PROBLEM — N92.0 MENORRHAGIA: Status: ACTIVE | Noted: 2017-08-08

## 2018-10-04 LAB — TSH SERPL DL<=0.05 MIU/L-ACNC: 1.26 UIU/ML (ref 0.36–3.74)

## 2018-10-04 PROCEDURE — 36415 COLL VENOUS BLD VENIPUNCTURE: CPT

## 2018-10-04 PROCEDURE — 1036F TOBACCO NON-USER: CPT | Performed by: NURSE PRACTITIONER

## 2018-10-04 PROCEDURE — 90471 IMMUNIZATION ADMIN: CPT | Performed by: NURSE PRACTITIONER

## 2018-10-04 PROCEDURE — 90688 IIV4 VACCINE SPLT 0.5 ML IM: CPT | Performed by: NURSE PRACTITIONER

## 2018-10-04 PROCEDURE — 99214 OFFICE O/P EST MOD 30 MIN: CPT | Performed by: NURSE PRACTITIONER

## 2018-10-04 PROCEDURE — 84443 ASSAY THYROID STIM HORMONE: CPT

## 2018-10-04 RX ORDER — BUSPIRONE HYDROCHLORIDE 7.5 MG/1
TABLET ORAL
Qty: 60 TABLET | Refills: 0 | Status: SHIPPED | OUTPATIENT
Start: 2018-10-04 | End: 2019-02-05 | Stop reason: DRUGHIGH

## 2018-10-04 NOTE — PATIENT INSTRUCTIONS
Buspirone (By mouth)   Buspirone (sok-ILUM-rouz)  Treats anxiety  Brand Name(s):   There may be other brand names for this medicine  When This Medicine Should Not Be Used: You should not use this medicine if you have had an allergic reaction to buspirone  How to Use This Medicine:   Tablet  · Your doctor will tell you how much of this medicine to take and how often  Do not take more medicine or take it more often than your doctor tells you to  · You may take this medicine with or without food, but take it the same way each time  · You may need to take this medicine for 1 or 2 weeks before you begin to feel better  If a dose is missed:   · If you miss a dose or forget to take your medicine, take it as soon as you can  If it is almost time for your next dose, wait until then to take the medicine and skip the missed dose  · Do not use extra medicine to make up for a missed dose  How to Store and Dispose of This Medicine:   · Store the medicine at room temperature, away from heat, moisture, and direct light  · Keep all medicine out of the reach of children and never share your medicine with anyone  Drugs and Foods to Avoid:   Ask your doctor or pharmacist before using any other medicine, including over-the-counter medicines, vitamins, and herbal products  · You should not use buspirone when you are also using an MAO inhibitor (such as Eldepryl®, Marplan®, Nardil®, Parnate®)  · Do not eat grapefruit, drink grapefruit juice, or drink alcohol while you are using buspirone  · Make sure your doctor knows if you are also using cimetidine (Manueld Horse), dexamethasone (Decadron®), diltiazem (Angelic Matias), erythromycin (Erythro-Tab®), itraconazole (Sporanox®), nefazodone (Serzone®), rifampin (Rifadin®, Rifamate®, Rifater®), verapamil (Alean Child), or medicine for seizures (such as Dilantin®, Luminal®, Tegretol®)    · Make sure your doctor knows if you are using any medicines that make you sleepy (such as sleeping pills, cold and allergy medicine, narcotic pain relievers, or sedatives)  Warnings While Using This Medicine:   · Make sure your doctor knows if you are pregnant or breastfeeding, or if you have kidney or liver disease  · Do not stop using this medicine suddenly without asking your doctor  You may need to slowly decrease your dose before stopping it completely  · This medicine may make you dizzy or drowsy  Avoid driving, using machines, or doing anything else that could be dangerous if you are not alert  Possible Side Effects While Using This Medicine:   Call your doctor right away if you notice any of these side effects:  · Fast or pounding heartbeat  · Numbness or tingling feeling  · Tremors or shaking  If you notice these less serious side effects, talk with your doctor:   · Drowsiness or weakness  · Dry mouth  · Feeling restless or nervous, trouble sleeping  · Headache  · Nausea, constipation, upset stomach  If you notice other side effects that you think are caused by this medicine, tell your doctor  Call your doctor for medical advice about side effects  You may report side effects to FDA at 3-631-FDA-3415  © 2017 [facility] Information is for End User's use only and may not be sold, redistributed or otherwise used for commercial purposes  The above information is an  only  It is not intended as medical advice for individual conditions or treatments  Talk to your doctor, nurse or pharmacist before following any medical regimen to see if it is safe and effective for you

## 2018-10-04 NOTE — PROGRESS NOTES
Assessment/Plan: Patient to continue Zoloft 50 mg daily and will start on Buspar 7 5 mg by mouth BID for her anxiety  She was advised if any side effects are worsening of anxiety while on this to call the office  Will give numbers to [facility]iza Guevara and [facility] for her continued anxiety  She does contract for safety today in the office  Patient for Flu vaccine today  Will bring back in one month to see how she is tolerating this medication  No problem-specific Assessment & Plan notes found for this encounter  Problem List Items Addressed This Visit     Anxiety and depression - Primary    Relevant Medications    busPIRone (BUSPAR) 7 5 mg tablet    Hyperhidrosis      Other Visit Diagnoses     Need for influenza vaccination        Relevant Orders    MULTI-DOSE VIAL: influenza vaccine, 3225-0552, quadrivalent, 0 5 mL, for patients 3+ yr (FLUZONE)            Subjective:      Patient ID: Jaylen Foley is a 24 y o  female  Arcelia Plummerat is here today for a follow up visit  She is taking Zoloft for her anxiety and depression and states her depression is doing much better but her anxiety seems to be worse  She has not yet followed up with counseling but would like to in the near future  She states she feels like she is on the verge of a panic attack and at times feels very jittery  She is recently in a new relationship and does have 4 children  She states her last relationship was not a good one and she is constantly worried her new boyfriend will cheat  She did follow up with Dermatology and they did diagnose her with hyperhidrosis  She was started on Drysol and does follow back up with them in 6 weeks  She offers no other issues  The following portions of the patient's history were reviewed and updated as appropriate:   She  has a past medical history of Irritable bowel syndrome and Maternal obesity, antepartum    She   Patient Active Problem List    Diagnosis Date Noted    Hyperhidrosis 10/04/2018    Anxiety and depression 2018    Sweating abnormality 2018    Anxiety 2018    Current mild episode of major depressive disorder without prior episode ([facility] [address] ) 2018    Irritable bowel syndrome with both constipation and diarrhea 2017    Menorrhagia 2017     She  has a past surgical history that includes  section and Foot surgery  Her family history includes Irritable bowel syndrome in her mother; Ovarian cancer in her family and family  She  reports that she has never smoked  She has never used smokeless tobacco  She reports that she does not drink alcohol or use drugs  Current Outpatient Prescriptions   Medication Sig Dispense Refill    aluminum chloride (DRYSOL) 20 % external solution Apply topically daily at bedtime      dicyclomine (BENTYL) 20 mg tablet Take 20 mg by mouth      sertraline (ZOLOFT) 50 mg tablet Take 1 tablet (50 mg total) by mouth daily 30 tablet 3    busPIRone (BUSPAR) 7 5 mg tablet Take one tablet by mouth every 12 hours 60 tablet 0     No current facility-administered medications for this visit  Current Outpatient Prescriptions on File Prior to Visit   Medication Sig    dicyclomine (BENTYL) 20 mg tablet Take 20 mg by mouth    sertraline (ZOLOFT) 50 mg tablet Take 1 tablet (50 mg total) by mouth daily     No current facility-administered medications on file prior to visit  She is allergic to penicillin g     Review of Systems   Constitutional: Negative  HENT: Negative  Eyes: Negative  Respiratory: Negative  Cardiovascular: Negative  Gastrointestinal: Negative  Endocrine: Negative  Genitourinary: Negative  Musculoskeletal: Negative  Allergic/Immunologic: Negative  Neurological: Negative  Hematological: Negative  Psychiatric/Behavioral: The patient is nervous/anxious            Objective:      /82 (BP Location: Right arm, Patient Position: Sitting, Cuff Size: Adult)   Pulse 95   Temp 98 7 °F (37 1 °C) (Temporal)   Ht 5' 5" (1 651 m)   Wt 86 7 kg (191 lb 1 6 oz)   SpO2 98%   BMI 31 80 kg/m²          Physical Exam   Constitutional: She is oriented to person, place, and time  She appears well-developed and well-nourished  HENT:   Head: Normocephalic and atraumatic  Right Ear: External ear normal    Left Ear: External ear normal    Nose: Nose normal    Mouth/Throat: Oropharynx is clear and moist    Eyes: Pupils are equal, round, and reactive to light  Conjunctivae and EOM are normal    Neck: Normal range of motion  Neck supple  Cardiovascular: Normal rate, regular rhythm, normal heart sounds and intact distal pulses  Pulmonary/Chest: Effort normal and breath sounds normal    Abdominal: Soft  Bowel sounds are normal    Musculoskeletal: Normal range of motion  Neurological: She is alert and oriented to person, place, and time  She has normal reflexes  Skin: Skin is warm and dry  Psychiatric: She has a normal mood and affect  Her behavior is normal  Judgment and thought content normal    Vitals reviewed

## 2018-10-29 ENCOUNTER — TELEPHONE (OUTPATIENT)
Dept: INTERNAL MEDICINE CLINIC | Facility: CLINIC | Age: 28
End: 2018-10-29

## 2018-10-29 DIAGNOSIS — B37.3 YEAST INFECTION OF THE VAGINA: Primary | ICD-10-CM

## 2018-10-29 RX ORDER — FLUCONAZOLE 150 MG/1
TABLET ORAL
Qty: 2 TABLET | Refills: 0 | Status: SHIPPED | OUTPATIENT
Start: 2018-10-29 | End: 2018-10-31

## 2018-10-29 NOTE — TELEPHONE ENCOUNTER
Patient called the office and stated that she had a yeast infection last week and tried the monistat OTC but didn't help  Patient is asking if we can call in Luciana to [facility]  Patient states that she doesn't have any allergies to any medications  Per our conversation that it is okay to send in for the patient

## 2019-02-05 ENCOUNTER — OFFICE VISIT (OUTPATIENT)
Dept: INTERNAL MEDICINE CLINIC | Facility: CLINIC | Age: 29
End: 2019-02-05
Payer: COMMERCIAL

## 2019-02-05 VITALS
DIASTOLIC BLOOD PRESSURE: 74 MMHG | OXYGEN SATURATION: 98 % | TEMPERATURE: 98.4 F | HEART RATE: 85 BPM | WEIGHT: 203.8 LBS | HEIGHT: 65 IN | BODY MASS INDEX: 33.95 KG/M2 | SYSTOLIC BLOOD PRESSURE: 116 MMHG

## 2019-02-05 DIAGNOSIS — Z00.00 ROUTINE HEALTH MAINTENANCE: Primary | ICD-10-CM

## 2019-02-05 DIAGNOSIS — R61 HYPERHIDROSIS: ICD-10-CM

## 2019-02-05 DIAGNOSIS — F32.A ANXIETY AND DEPRESSION: ICD-10-CM

## 2019-02-05 DIAGNOSIS — F41.9 ANXIETY AND DEPRESSION: ICD-10-CM

## 2019-02-05 PROCEDURE — 99395 PREV VISIT EST AGE 18-39: CPT | Performed by: NURSE PRACTITIONER

## 2019-02-05 RX ORDER — ALPRAZOLAM 0.25 MG/1
TABLET ORAL
Qty: 30 TABLET | Refills: 0 | Status: SHIPPED | OUTPATIENT
Start: 2019-02-05 | End: 2019-12-04 | Stop reason: SDUPTHER

## 2019-02-05 NOTE — PATIENT INSTRUCTIONS
Wellness Visit for Adults   WHAT YOU NEED TO KNOW:   What is a wellness visit? A wellness visit is when you see your healthcare provider to get screened for health problems  You can also get advice on how to stay healthy  Write down your questions so you remember to ask them  Ask your healthcare provider how often you should have a wellness visit  What happens at a wellness visit? Your healthcare provider will ask about your health, and your family history of health problems  This includes high blood pressure, heart disease, and cancer  He or she will ask if you have symptoms that concern you, if you smoke, and about your mood  You may also be asked about your intake of medicines, supplements, food, and alcohol  Any of the following may be done:  · Your weight  will be checked  Your height may also be checked so your body mass index (BMI) can be calculated  Your BMI shows if you are at a healthy weight  · Your blood pressure  and heart rate will be checked  Your temperature may also be checked  · Blood and urine tests  may be done  Blood tests may be done to check your cholesterol levels  Abnormal cholesterol levels increase your risk for heart disease and stroke  You may also need a blood or urine test to check for diabetes if you are at increased risk  Urine tests may be done to look for signs of an infection or kidney disease  · A physical exam  includes checking your heartbeat and lungs with a stethoscope  Your healthcare provider may also check your skin to look for sun damage  · Screening tests  may be recommended  A screening test is done to check for diseases that may not cause symptoms  The screening tests you may need depend on your age, gender, family history, and lifestyle habits  For example, colorectal screening may be recommended if you are 48years old or older  What screening tests do I need if I am a woman? · A Pap smear  is used to screen for cervical cancer   Pap smears are usually done every 3 to 5 years depending on your age  You may need them more often if you have had abnormal Pap smear test results in the past  Ask your healthcare provider how often you should have a Pap smear  · A mammogram  is an x-ray of your breasts to screen for breast cancer  Experts recommend mammograms every 2 years starting at age 48 years  You may need a mammogram at age 52 years or younger if you have an increased risk for breast cancer  Talk to your healthcare provider about when you should start having mammograms and how often you need them  What vaccines might I need? · Get an influenza vaccine  every year  The influenza vaccine protects you from the flu  Several types of viruses cause the flu  The viruses change over time, so new vaccines are made each year  · Get a tetanus-diphtheria (Td) booster vaccine  every 10 years  This vaccine protects you against tetanus and diphtheria  Tetanus is a severe infection that may cause painful muscle spasms and lockjaw  Diphtheria is a severe bacterial infection that causes a thick covering in the back of your mouth and throat  · Get a human papillomavirus (HPV) vaccine  if you are female and aged 23 to 32 or male 23 to 24 and never received it  This vaccine protects you from HPV infection  HPV is the most common infection spread by sexual contact  HPV may also cause vaginal, penile, and anal cancers  · Get a pneumococcal vaccine  if you are aged 72 years or older  The pneumococcal vaccine is an injection given to protect you from pneumococcal disease  Pneumococcal disease is an infection caused by pneumococcal bacteria  The infection may cause pneumonia, meningitis, or an ear infection  · Get a shingles vaccine  if you are aged 61 or older, even if you have had shingles before  The shingles vaccine is an injection to protect you from the varicella-zoster virus  This is the same virus that causes chickenpox   Shingles is a painful rash that develops in people who had chickenpox or have been exposed to the virus  How can I eat healthy? My Plate is a model for planning healthy meals  It shows the types and amounts of foods that should go on your plate  Fruits and vegetables make up about half of your plate, and grains and protein make up the other half  A serving of dairy is included on the side of your plate  The amount of calories and serving sizes you need depends on your age, gender, weight, and height  Examples of healthy foods are listed below:  · Eat a variety of vegetables  such as dark green, red, and orange vegetables  You can also include canned vegetables low in sodium (salt) and frozen vegetables without added butter or sauces  · Eat a variety of fresh fruits , canned fruit in 100% juice, frozen fruit, and dried fruit  · Include whole grains  At least half of the grains you eat should be whole grains  Examples include whole-wheat bread, wheat pasta, brown rice, and whole-grain cereals such as oatmeal     · Eat a variety of protein foods such as seafood (fish and shellfish), lean meat, and poultry without skin (turkey and chicken)  Examples of lean meats include pork leg, shoulder, or tenderloin, and beef round, sirloin, tenderloin, and extra lean ground beef  Other protein foods include eggs and egg substitutes, beans, peas, soy products, nuts, and seeds  · Choose low-fat dairy products such as skim or 1% milk or low-fat yogurt, cheese, and cottage cheese  · Limit unhealthy fats  such as butter, hard margarine, and shortening  How much exercise do I need? Exercise at least 30 minutes per day on most days of the week  Some examples of exercise include walking, biking, dancing, and swimming  You can also fit in more physical activity by taking the stairs instead of the elevator or parking farther away from stores  Include muscle strengthening activities 2 days each week  Regular exercise provides many health benefits  It helps you manage your weight, and decreases your risk for type 2 diabetes, heart disease, stroke, and high blood pressure  Exercise can also help improve your mood  Ask your healthcare provider about the best exercise plan for you  What are some general health and safety guidelines I should follow? · Do not smoke  Nicotine and other chemicals in cigarettes and cigars can cause lung damage  Ask your healthcare provider for information if you currently smoke and need help to quit  E-cigarettes or smokeless tobacco still contain nicotine  Talk to your healthcare provider before you use these products  · Limit alcohol  A drink of alcohol is 12 ounces of beer, 5 ounces of wine, or 1½ ounces of liquor  · Lose weight, if needed  Being overweight increases your risk of certain health conditions  These include heart disease, high blood pressure, type 2 diabetes, and certain types of cancer  · Protect your skin  Do not sunbathe or use tanning beds  Use sunscreen with a SPF 15 or higher  Apply sunscreen at least 15 minutes before you go outside  Reapply sunscreen every 2 hours  Wear protective clothing, hats, and sunglasses when you are outside  · Drive safely  Always wear your seatbelt  Make sure everyone in your car wears a seatbelt  A seatbelt can save your life if you are in an accident  Do not use your cell phone when you are driving  This could distract you and cause an accident  Pull over if you need to make a call or send a text message  · Practice safe sex  Use latex condoms if are sexually active and have more than one partner  Your healthcare provider may recommend screening tests for sexually transmitted infections (STIs)  · Wear helmets, lifejackets, and protective gear  Always wear a helmet when you ride a bike or motorcycle, go skiing, or play sports that could cause a head injury  Wear protective equipment when you play sports   Wear a lifejacket when you are on a boat or doing water sports  CARE AGREEMENT:   You have the right to help plan your care  Learn about your health condition and how it may be treated  Discuss treatment options with your caregivers to decide what care you want to receive  You always have the right to refuse treatment  The above information is an  only  It is not intended as medical advice for individual conditions or treatments  Talk to your doctor, nurse or pharmacist before following any medical regimen to see if it is safe and effective for you  © 2017 [facility] Information is for End User's use only and may not be sold, redistributed or otherwise used for commercial purposes  All illustrations and images included in CareNotes® are the copyrighted property of A D A M [employer] Inc  or [employer] Gil

## 2019-02-05 NOTE — PROGRESS NOTES
Assessment/Plan: Will order labs to have done in one year  Patient is up to date on her screenings and vaccines  Will give Xanax 0 25 mg by mouth once daily as needed  She was advised this will only be short term and to take strictly as needed  Inquiry ran and consistent with prescribing history  Will follow up in one year or sooner if need be  No problem-specific Assessment & Plan notes found for this encounter  Problem List Items Addressed This Visit     Anxiety and depression    Relevant Medications    ALPRAZolam (XANAX) 0 25 mg tablet    Other Relevant Orders    Comprehensive metabolic panel    CBC and differential    TSH, 3rd generation with Free T4 reflex    Hyperhidrosis    Relevant Orders    TSH, 3rd generation with Free T4 reflex    Routine health maintenance - Primary    Relevant Orders    Comprehensive metabolic panel    CBC and differential    TSH, 3rd generation with Free T4 reflex            Subjective:      Patient ID: Richard Elam is a 34 y o  female  Ada Christina is here today for a routine health physical   She is doing well today  She is taking Zoloft and Buspar for anxiety and depression but was having issues getting her Buspar filled  She states no where did have it and was wondering if she can try something else  She states she would just like something strictly as needed  She denies any suicidal or homicidal ideations  She is up to date on her screenings  She does follow up with Dr Risa Recinos for GYN  She is getting her menses monthly and her flow is regular  She denies any chest pain, SOB, or palpitations  She denies any tobacco, alcohol, or drug use  She states her IBS has been controlled  She does follow up with Dermatology for her Hyperhidrosis and is using Drysol which is helping  She offers no other issues          The following portions of the patient's history were reviewed and updated as appropriate:   She  has a past medical history of Irritable bowel syndrome and Maternal obesity, antepartum  She   Patient Active Problem List    Diagnosis Date Noted    Routine health maintenance 2019    Hyperhidrosis 10/04/2018    Anxiety and depression 2018    Sweating abnormality 2018    Anxiety 2018    Current mild episode of major depressive disorder without prior episode ([facility] [address] ) 2018    Irritable bowel syndrome with both constipation and diarrhea 2017    Menorrhagia 2017     She  has a past surgical history that includes  section and Foot surgery  Her family history includes Irritable bowel syndrome in her mother; Ovarian cancer in her family and family  She  reports that she has never smoked  She has never used smokeless tobacco  She reports that she does not drink alcohol or use drugs  Current Outpatient Prescriptions   Medication Sig Dispense Refill    aluminum chloride (DRYSOL) 20 % external solution Apply topically daily at bedtime      dicyclomine (BENTYL) 20 mg tablet Take 20 mg by mouth      sertraline (ZOLOFT) 50 mg tablet Take 1 tablet (50 mg total) by mouth daily 30 tablet 3    ALPRAZolam (XANAX) 0 25 mg tablet Take one tablet by mouth once daily as needed 30 tablet 0     No current facility-administered medications for this visit  Current Outpatient Prescriptions on File Prior to Visit   Medication Sig    aluminum chloride (DRYSOL) 20 % external solution Apply topically daily at bedtime    dicyclomine (BENTYL) 20 mg tablet Take 20 mg by mouth    sertraline (ZOLOFT) 50 mg tablet Take 1 tablet (50 mg total) by mouth daily    [DISCONTINUED] busPIRone (BUSPAR) 7 5 mg tablet Take one tablet by mouth every 12 hours (Patient not taking: Reported on 2019 )     No current facility-administered medications on file prior to visit  She is allergic to penicillin g     Review of Systems   All other systems reviewed and are negative        Below is the patient's most recent value for Albumin, ALT, AST, BUN, Calcium, Chloride, Cholesterol, CO2, Creatinine, GFR, Glucose, HDL, Hematocrit, Hemoglobin, Hemoglobin A1C, LDL, Magnesium, Phosphorus, Platelets, Potassium, PSA, Sodium, Triglycerides, and WBC  Lab Results   Component Value Date    ALT 18 04/16/2018    AST 10 04/16/2018    BUN 19 04/16/2018    CALCIUM 8 7 04/16/2018     04/16/2018    CO2 22 04/16/2018    CREATININE 0 70 04/16/2018    HCT 39 2 04/16/2018    HGB 13 0 04/16/2018     04/16/2018    K 3 9 04/16/2018    WBC 8 84 04/16/2018     Note: for a comprehensive list of the patient's lab results, access the Results Review activity  Objective:      /74 (BP Location: Right arm, Patient Position: Sitting, Cuff Size: Large)   Pulse 85   Temp 98 4 °F (36 9 °C) (Temporal)   Ht 5' 5" (1 651 m)   Wt 92 4 kg (203 lb 12 8 oz)   SpO2 98%   BMI 33 91 kg/m²          Physical Exam   Constitutional: She is oriented to person, place, and time  She appears well-developed and well-nourished  HENT:   Head: Normocephalic and atraumatic  Right Ear: External ear normal    Left Ear: External ear normal    Nose: Nose normal    Mouth/Throat: Oropharynx is clear and moist    Eyes: Pupils are equal, round, and reactive to light  Conjunctivae and EOM are normal    Neck: Normal range of motion  Neck supple  Cardiovascular: Normal rate, regular rhythm, normal heart sounds and intact distal pulses  Pulmonary/Chest: Effort normal and breath sounds normal    Abdominal: Soft  Bowel sounds are normal    Musculoskeletal: Normal range of motion  Neurological: She is alert and oriented to person, place, and time  She has normal reflexes  Skin: Skin is warm and dry  Psychiatric: She has a normal mood and affect  Her behavior is normal  Judgment and thought content normal    Vitals reviewed

## 2019-05-29 ENCOUNTER — OFFICE VISIT (OUTPATIENT)
Dept: INTERNAL MEDICINE CLINIC | Facility: CLINIC | Age: 29
End: 2019-05-29
Payer: COMMERCIAL

## 2019-05-29 VITALS
HEIGHT: 65 IN | DIASTOLIC BLOOD PRESSURE: 68 MMHG | BODY MASS INDEX: 35.4 KG/M2 | OXYGEN SATURATION: 98 % | SYSTOLIC BLOOD PRESSURE: 100 MMHG | HEART RATE: 80 BPM | WEIGHT: 212.5 LBS | TEMPERATURE: 98.1 F

## 2019-05-29 DIAGNOSIS — G44.009 CLUSTER HEADACHE, NOT INTRACTABLE, UNSPECIFIED CHRONICITY PATTERN: Primary | ICD-10-CM

## 2019-05-29 PROBLEM — Z00.00 ROUTINE HEALTH MAINTENANCE: Status: RESOLVED | Noted: 2019-02-05 | Resolved: 2019-05-29

## 2019-05-29 PROCEDURE — 99213 OFFICE O/P EST LOW 20 MIN: CPT | Performed by: NURSE PRACTITIONER

## 2019-05-29 RX ORDER — SUMATRIPTAN 50 MG/1
50 TABLET, FILM COATED ORAL ONCE AS NEEDED
Qty: 9 TABLET | Refills: 0 | Status: SHIPPED | OUTPATIENT
Start: 2019-05-29 | End: 2020-02-26

## 2019-06-04 ENCOUNTER — HOSPITAL ENCOUNTER (EMERGENCY)
Facility: HOSPITAL | Age: 29
Discharge: HOME | End: 2019-06-04
Attending: INTERNAL MEDICINE
Payer: COMMERCIAL

## 2019-06-04 VITALS
RESPIRATION RATE: 20 BRPM | OXYGEN SATURATION: 99 % | SYSTOLIC BLOOD PRESSURE: 123 MMHG | DIASTOLIC BLOOD PRESSURE: 74 MMHG | BODY MASS INDEX: 35.41 KG/M2 | TEMPERATURE: 98.3 F | HEART RATE: 68 BPM | HEIGHT: 65 IN | WEIGHT: 212.52 LBS

## 2019-06-04 DIAGNOSIS — G43.909 MIGRAINE HEADACHE: Primary | ICD-10-CM

## 2019-06-04 PROCEDURE — 99283 EMERGENCY DEPT VISIT LOW MDM: CPT

## 2019-06-04 PROCEDURE — 96375 TX/PRO/DX INJ NEW DRUG ADDON: CPT

## 2019-06-04 PROCEDURE — 96361 HYDRATE IV INFUSION ADD-ON: CPT

## 2019-06-04 PROCEDURE — 96374 THER/PROPH/DIAG INJ IV PUSH: CPT

## 2019-06-04 RX ORDER — ONDANSETRON 4 MG/1
4 TABLET, ORALLY DISINTEGRATING ORAL EVERY 8 HOURS PRN
Qty: 20 TABLET | Refills: 0 | Status: SHIPPED | OUTPATIENT
Start: 2019-06-04 | End: 2020-02-26

## 2019-06-04 RX ORDER — KETOROLAC TROMETHAMINE 30 MG/ML
30 INJECTION, SOLUTION INTRAMUSCULAR; INTRAVENOUS ONCE
Status: COMPLETED | OUTPATIENT
Start: 2019-06-04 | End: 2019-06-04

## 2019-06-04 RX ORDER — ONDANSETRON 2 MG/ML
4 INJECTION INTRAMUSCULAR; INTRAVENOUS ONCE
Status: COMPLETED | OUTPATIENT
Start: 2019-06-04 | End: 2019-06-04

## 2019-06-04 RX ADMIN — SODIUM CHLORIDE 1000 ML: 0.9 INJECTION, SOLUTION INTRAVENOUS at 17:35

## 2019-06-04 RX ADMIN — ONDANSETRON 4 MG: 2 INJECTION INTRAMUSCULAR; INTRAVENOUS at 17:40

## 2019-06-04 RX ADMIN — KETOROLAC TROMETHAMINE 30 MG: 30 INJECTION, SOLUTION INTRAMUSCULAR; INTRAVENOUS at 17:40

## 2019-06-06 ENCOUNTER — OFFICE VISIT (OUTPATIENT)
Dept: INTERNAL MEDICINE CLINIC | Facility: CLINIC | Age: 29
End: 2019-06-06
Payer: COMMERCIAL

## 2019-06-06 VITALS
HEART RATE: 82 BPM | HEIGHT: 65 IN | DIASTOLIC BLOOD PRESSURE: 78 MMHG | WEIGHT: 211.5 LBS | TEMPERATURE: 98 F | OXYGEN SATURATION: 99 % | BODY MASS INDEX: 35.24 KG/M2 | SYSTOLIC BLOOD PRESSURE: 102 MMHG

## 2019-06-06 DIAGNOSIS — G43.901 MIGRAINE WITH STATUS MIGRAINOSUS, NOT INTRACTABLE, UNSPECIFIED MIGRAINE TYPE: Primary | ICD-10-CM

## 2019-06-06 PROCEDURE — 99213 OFFICE O/P EST LOW 20 MIN: CPT | Performed by: NURSE PRACTITIONER

## 2019-06-06 PROCEDURE — 1036F TOBACCO NON-USER: CPT | Performed by: NURSE PRACTITIONER

## 2019-06-06 PROCEDURE — 3008F BODY MASS INDEX DOCD: CPT | Performed by: NURSE PRACTITIONER

## 2019-11-13 ENCOUNTER — TELEPHONE (OUTPATIENT)
Dept: INTERNAL MEDICINE CLINIC | Facility: CLINIC | Age: 29
End: 2019-11-13

## 2019-11-13 ENCOUNTER — OFFICE VISIT (OUTPATIENT)
Dept: URGENT CARE | Facility: CLINIC | Age: 29
End: 2019-11-13
Payer: COMMERCIAL

## 2019-11-13 VITALS
WEIGHT: 212 LBS | BODY MASS INDEX: 36.19 KG/M2 | RESPIRATION RATE: 18 BRPM | OXYGEN SATURATION: 98 % | SYSTOLIC BLOOD PRESSURE: 118 MMHG | HEIGHT: 64 IN | DIASTOLIC BLOOD PRESSURE: 70 MMHG | TEMPERATURE: 98 F | HEART RATE: 74 BPM

## 2019-11-13 DIAGNOSIS — M94.0 COSTOCHONDRITIS: Primary | ICD-10-CM

## 2019-11-13 PROCEDURE — G0382 LEV 3 HOSP TYPE B ED VISIT: HCPCS | Performed by: PHYSICIAN ASSISTANT

## 2019-11-13 PROCEDURE — 99203 OFFICE O/P NEW LOW 30 MIN: CPT | Performed by: PHYSICIAN ASSISTANT

## 2019-11-13 PROCEDURE — 99283 EMERGENCY DEPT VISIT LOW MDM: CPT | Performed by: PHYSICIAN ASSISTANT

## 2019-11-13 RX ORDER — METHYLPREDNISOLONE 4 MG/1
TABLET ORAL
Qty: 1 EACH | Refills: 0 | Status: SHIPPED | OUTPATIENT
Start: 2019-11-13 | End: 2020-02-16 | Stop reason: ALTCHOICE

## 2019-11-13 NOTE — PROGRESS NOTES
[facility]        NAME: Judith Griffin is a 34 y o  female  : 1990    MRN: [id number]  DATE: 2019  TIME: 2:52 PM    Assessment and Plan   Costochondritis [M94 0]  1  Costochondritis  methylPREDNISolone 4 MG tablet therapy pack         Patient Instructions     May use heat/ice as tolerated as well  Follow up with PCP in 3-5 days  Proceed to  ER if symptoms worsen  Chief Complaint     Chief Complaint   Patient presents with    Muscle Pain     Cough started 2 weeks ago and with the cough developed chest and back spasm type discomfort  History of Present Illness       35 y/o F presents for eval of reproducible chest and back pain  Pt has had a cough x2 weeks and states the constant coughing precipitated the pain - pain began approx 1 week after upper respiratory infection/coughing began  She states cough and URI eventually resolved but she still has the pain  It goes through to her back and sides occasionally and is worse with movement  She denies any N/V, dyspnea, wheezing, fever, chills, abd pain  No pertinent pmhx  No recent travel  Review of Systems   Review of Systems   All other systems reviewed and are negative          Current Medications       Current Outpatient Medications:     ALPRAZolam (XANAX) 0 25 mg tablet, Take one tablet by mouth once daily as needed (Patient not taking: Reported on 2019), Disp: 30 tablet, Rfl: 0    aluminum chloride (DRYSOL) 20 % external solution, Apply topically daily at bedtime, Disp: , Rfl:     methylPREDNISolone 4 MG tablet therapy pack, Use as directed on package, Disp: 1 each, Rfl: 0    ondansetron (ZOFRAN-ODT) 4 mg disintegrating tablet, Take 1 tablet (4 mg total) by mouth every 8 (eight) hours as needed for nausea or vomiting (Patient not taking: Reported on 2019), Disp: 20 tablet, Rfl: 0    SUMAtriptan (IMITREX) 50 mg tablet, Take 1 tablet (50 mg total) by mouth once as needed for migraine for up to 1 dose (Patient not taking: Reported on 2019), Disp: 9 tablet, Rfl: 0    Current Allergies     Allergies as of 2019 - Reviewed 2019   Allergen Reaction Noted    Penicillin g Hives 2016            The following portions of the patient's history were reviewed and updated as appropriate: allergies, current medications, past family history, past medical history, past social history, past surgical history and problem list      Past Medical History:   Diagnosis Date    Irritable bowel syndrome     last assessed: 10/23/2015    Maternal obesity, antepartum     last assessed: 2017    Migraine        Past Surgical History:   Procedure Laterality Date     SECTION      FOOT SURGERY         Family History   Problem Relation Age of Onset    Irritable bowel syndrome Mother     Ovarian cancer Family     Ovarian cancer Family          Medications have been verified  Objective   /70   Pulse 74   Temp 98 °F (36 7 °C) (Tympanic)   Resp 18   Ht 5' 4" (1 626 m)   Wt 96 2 kg (212 lb)   LMP 10/27/2019   SpO2 98%   BMI 36 39 kg/m²        Physical Exam     Physical Exam   Constitutional: She is oriented to person, place, and time  She appears well-developed and well-nourished  No distress  HENT:   Head: Normocephalic and atraumatic  Right Ear: Hearing, tympanic membrane, external ear and ear canal normal    Left Ear: Hearing, tympanic membrane, external ear and ear canal normal    Nose: Nose normal    Mouth/Throat: Uvula is midline, oropharynx is clear and moist and mucous membranes are normal    Eyes: Pupils are equal, round, and reactive to light  Conjunctivae and EOM are normal    Cardiovascular: Normal rate and regular rhythm  Exam reveals no gallop and no friction rub  No murmur heard  Pulmonary/Chest: Effort normal and breath sounds normal  No respiratory distress  She has no wheezes  She has no rales  She exhibits no crepitus, no deformity and no swelling  Neurological: She is alert and oriented to person, place, and time  Skin: Skin is warm and dry

## 2019-11-13 NOTE — TELEPHONE ENCOUNTER
Patient called stating that she has had a cough for a couple of weeks and now she thinks from all of the coughing that she is having muscle spasms causing pain in chest and back  It happens now and then and  patient stated that she knows it is not her heart because of all of the coughing she has been doing  I offered patient several appointments but she works from [employer]    To 7 p m  Therefore she is going to Urgent Care

## 2019-12-04 DIAGNOSIS — F32.A ANXIETY AND DEPRESSION: ICD-10-CM

## 2019-12-04 DIAGNOSIS — F41.9 ANXIETY AND DEPRESSION: ICD-10-CM

## 2019-12-04 RX ORDER — ALPRAZOLAM 0.25 MG/1
TABLET ORAL
Qty: 30 TABLET | Refills: 0 | Status: SHIPPED | OUTPATIENT
Start: 2019-12-04 | End: 2020-02-26

## 2020-02-16 ENCOUNTER — OFFICE VISIT (OUTPATIENT)
Dept: URGENT CARE | Facility: CLINIC | Age: 30
End: 2020-02-16
Payer: COMMERCIAL

## 2020-02-16 VITALS
SYSTOLIC BLOOD PRESSURE: 129 MMHG | WEIGHT: 179 LBS | HEIGHT: 64 IN | HEART RATE: 85 BPM | RESPIRATION RATE: 18 BRPM | OXYGEN SATURATION: 99 % | BODY MASS INDEX: 30.56 KG/M2 | DIASTOLIC BLOOD PRESSURE: 82 MMHG | TEMPERATURE: 99.2 F

## 2020-02-16 DIAGNOSIS — J06.9 UPPER RESPIRATORY TRACT INFECTION, UNSPECIFIED TYPE: ICD-10-CM

## 2020-02-16 DIAGNOSIS — H65.112 ACUTE MUCOID OTITIS MEDIA OF LEFT EAR: Primary | ICD-10-CM

## 2020-02-16 PROCEDURE — 99203 OFFICE O/P NEW LOW 30 MIN: CPT | Performed by: NURSE PRACTITIONER

## 2020-02-16 PROCEDURE — 99283 EMERGENCY DEPT VISIT LOW MDM: CPT | Performed by: NURSE PRACTITIONER

## 2020-02-16 PROCEDURE — G0382 LEV 3 HOSP TYPE B ED VISIT: HCPCS | Performed by: NURSE PRACTITIONER

## 2020-02-16 RX ORDER — PREDNISONE 20 MG/1
TABLET ORAL
Qty: 15 TABLET | Refills: 0 | Status: SHIPPED | OUTPATIENT
Start: 2020-02-16 | End: 2020-02-26

## 2020-02-16 RX ORDER — PREDNISONE 20 MG/1
60 TABLET ORAL ONCE
Status: COMPLETED | OUTPATIENT
Start: 2020-02-16 | End: 2020-02-16

## 2020-02-16 RX ORDER — AMOXICILLIN 875 MG/1
875 TABLET, COATED ORAL 2 TIMES DAILY
Qty: 20 TABLET | Refills: 0 | Status: SHIPPED | OUTPATIENT
Start: 2020-02-16 | End: 2020-02-21 | Stop reason: DRUGHIGH

## 2020-02-16 RX ORDER — CLONAZEPAM 0.5 MG/1
0.5 TABLET ORAL 2 TIMES DAILY
COMMUNITY
End: 2021-01-13 | Stop reason: HOSPADM

## 2020-02-16 RX ORDER — PAROXETINE HYDROCHLORIDE 20 MG/1
20 TABLET, FILM COATED ORAL DAILY
COMMUNITY
End: 2021-01-13 | Stop reason: HOSPADM

## 2020-02-16 RX ADMIN — PREDNISONE 60 MG: 20 TABLET ORAL at 20:37

## 2020-02-17 NOTE — PATIENT INSTRUCTIONS
Take the amoxicillin (antibiotic) and prednisone (steroid) as ordered until completed  Eat yogurt or take a probiotic to restore good bacteria to your gut; this helps prevent stomach irritation/diarrhea while on an antibiotic  Over the counter medications may be used to treat symptoms  Otitis Media   AMBULATORY CARE:   Otitis media  is an ear infection  Common symptoms include the following:   · Fever or a headache    · Ear pain    · Trouble hearing    · Ear feels plugged or full or you have ringing or buzzing in your ear    · Dizziness or you lose your balance    · Nausea or vomiting  Seek immediate care for the following symptoms:   · Seizure    · Fever and a stiff neck  Treatment for otitis media  may include any of the following:  · NSAIDs , such as ibuprofen, help decrease swelling, pain, and fever  This medicine is available with or without a doctor's order  NSAIDs can cause stomach bleeding or kidney problems in certain people  If you take blood thinner medicine, always ask your healthcare provider if NSAIDs are safe for you  Always read the medicine label and follow directions  · Ear drops  to help treat your ear pain  · Antibiotics  to help kill the germs that caused your ear infection  Care for otitis media:   · Use heat  Place a warm, moist washcloth on your ear to decrease pain  Apply for 15 to 20 minutes, 3 to 4 times a day    · Use ice  Ice helps decrease swelling and pain  Use an ice pack or put crushed ice in a plastic bag  Cover the ice pack with a towel and place it on your ear for 15 to 20 minutes, 3 to 4 times a day for 2 days  Prevent otitis media:   · Wash your hands often  This will help prevent the spread of germs  Encourage everyone in your house to wash their hands with soap and water after they use the bathroom  Everyone should also wash their hands after they change a child's diaper and before they prepare or eat food  · Stay away from people who are ill    Germs are easily and quickly spread through contact  Follow up with your healthcare provider as directed:  Write down your questions so you remember to ask them during your visits  © 2017 [address] Information is for End User's use only and may not be sold, redistributed or otherwise used for commercial purposes  All illustrations and images included in CareNotes® are the copyrighted property of [employer]  or Tima Gil  The above information is an  only  It is not intended as medical advice for individual conditions or treatments  Talk to your doctor, nurse or pharmacist before following any medical regimen to see if it is safe and effective for you  Upper Respiratory Infection   AMBULATORY CARE:   An upper respiratory infection  is also called a common cold  It can affect your nose, throat, ears, and sinuses  Common signs and symptoms include the following:  Cold symptoms are usually worst for the first 3 to 5 days  You may have any of the following:  · Runny or stuffy nose    · Sneezing and coughing    · Sore throat or hoarseness    · Red, watery, and sore eyes    · Fatigue     · Chills and fever    · Headache, body aches, or sore muscles  Seek care immediately if:   · You have chest pain or trouble breathing  Contact your healthcare provider if:   · You have a fever over 102ºF (39°C)  · Your sore throat gets worse or you see white or yellow spots in your throat  · Your symptoms get worse after 3 to 5 days or your cold is not better in 14 days  · You have a rash anywhere on your skin  · You have large, tender lumps in your neck  · You have thick, green or yellow drainage from your nose  · You cough up thick yellow, green, or bloody mucus  · You have vomiting for more than 24 hours and cannot keep fluids down  · You have a bad earache  · You have questions or concerns about your condition or care  Treatment for a cold:   There is no cure for the common cold  Colds are caused by viruses and do not get better with antibiotics  Most people get better in 7 to 14 days  You may continue to cough for 2 to 3 weeks  The following may help decrease your symptoms:  · Decongestants  help reduce nasal congestion and help you breathe more easily  If you take decongestant pills, they may make you feel restless or not able to sleep  Do not use decongestant sprays for more than a few days  · Cough suppressants  help reduce coughing  Ask your healthcare provider which type of cough medicine is best for you  · NSAIDs , such as ibuprofen, help decrease swelling, pain, and fever  NSAIDs can cause stomach bleeding or kidney problems in certain people  If you take blood thinner medicine, always ask your healthcare provider if NSAIDs are safe for you  Always read the medicine label and follow directions  · Acetaminophen  decreases pain and fever  It is available without a doctor's order  Ask how much to take and how often to take it  Follow directions  Read the labels of all other medicines you are using to see if they also contain acetaminophen, or ask your doctor or pharmacist  Acetaminophen can cause liver damage if not taken correctly  Do not use more than 4 grams (4,000 milligrams) total of acetaminophen in one day  Manage your cold:   · Rest as much as possible  Slowly start to do more each day  · Drink more liquids as directed  Liquids will help thin and loosen mucus so you can cough it up  Liquids will also help prevent dehydration  Liquids that help prevent dehydration include water, fruit juice, and broth  Do not drink liquids that contain caffeine  Caffeine can increase your risk for dehydration  Ask your healthcare provider how much liquid to drink each day  · Soothe a sore throat  Gargle with warm salt water  This helps your sore throat feel better  Make salt water by dissolving ¼ teaspoon salt in 1 cup warm water   You may also suck on hard candy or throat lozenges  You may use a sore throat spray  · Use a humidifier or vaporizer  Use a cool mist humidifier or a vaporizer to increase air moisture in your home  This may make it easier for you to breathe and help decrease your cough  · Use saline nasal drops as directed  These help relieve congestion  · Apply petroleum-based jelly around the outside of your nostrils  This can decrease irritation from blowing your nose  · Do not smoke  Nicotine and other chemicals in cigarettes and cigars can make your symptoms worse  They can also cause infections such as bronchitis or pneumonia  Ask your healthcare provider for information if you currently smoke and need help to quit  E-cigarettes or smokeless tobacco still contain nicotine  Talk to your healthcare provider before you use these products  Prevent spreading your cold to others:   · Try to stay away from other people during the first 2 to 3 days of your cold when it is more easily spread  · Do not share food or drinks  · Do not share hand towels with household members  · Wash your hands often, especially after you blow your nose  Turn away from other people and cover your mouth and nose with a tissue when you sneeze or cough  Follow up with your healthcare provider as directed:  Write down your questions so you remember to ask them during your visits  © 2017 [address] Information is for End User's use only and may not be sold, redistributed or otherwise used for commercial purposes  All illustrations and images included in CareNotes® are the copyrighted property of A D A M , Inc  or Tima Gil  The above information is an  only  It is not intended as medical advice for individual conditions or treatments  Talk to your doctor, nurse or pharmacist before following any medical regimen to see if it is safe and effective for you

## 2020-02-17 NOTE — PROGRESS NOTES
[facility]        NAME: Dayan Wright is a 34 y o  female  : 1990    MRN: [id number]  DATE: 2020  TIME: 8:57 PM    Assessment and Plan   Acute mucoid otitis media of left ear [H65 112]  1  Acute mucoid otitis media of left ear  amoxicillin (AMOXIL) 875 mg tablet    predniSONE 20 mg tablet    predniSONE tablet 60 mg   2  Upper respiratory tract infection, unspecified type  amoxicillin (AMOXIL) 875 mg tablet    predniSONE 20 mg tablet    predniSONE tablet 60 mg         Patient Instructions     Patient Instructions     Take the amoxicillin (antibiotic) and prednisone (steroid) as ordered until completed  Eat yogurt or take a probiotic to restore good bacteria to your gut; this helps prevent stomach irritation/diarrhea while on an antibiotic  Over the counter medications may be used to treat symptoms  Otitis Media   AMBULATORY CARE:   Otitis media  is an ear infection  Common symptoms include the following:   · Fever or a headache    · Ear pain    · Trouble hearing    · Ear feels plugged or full or you have ringing or buzzing in your ear    · Dizziness or you lose your balance    · Nausea or vomiting  Seek immediate care for the following symptoms:   · Seizure    · Fever and a stiff neck  Treatment for otitis media  may include any of the following:  · NSAIDs , such as ibuprofen, help decrease swelling, pain, and fever  This medicine is available with or without a doctor's order  NSAIDs can cause stomach bleeding or kidney problems in certain people  If you take blood thinner medicine, always ask your healthcare provider if NSAIDs are safe for you  Always read the medicine label and follow directions  · Ear drops  to help treat your ear pain  · Antibiotics  to help kill the germs that caused your ear infection  Care for otitis media:   · Use heat  Place a warm, moist washcloth on your ear to decrease pain  Apply for 15 to 20 minutes, 3 to 4 times a day    · Use ice    Ice helps decrease swelling and pain  Use an ice pack or put crushed ice in a plastic bag  Cover the ice pack with a towel and place it on your ear for 15 to 20 minutes, 3 to 4 times a day for 2 days  Prevent otitis media:   · Wash your hands often  This will help prevent the spread of germs  Encourage everyone in your house to wash their hands with soap and water after they use the bathroom  Everyone should also wash their hands after they change a child's diaper and before they prepare or eat food  · Stay away from people who are ill  Germs are easily and quickly spread through contact  Follow up with your healthcare provider as directed:  Write down your questions so you remember to ask them during your visits  © 2017 [facility] [facility] Information is for End User's use only and may not be sold, redistributed or otherwise used for commercial purposes  All illustrations and images included in CareNotes® are the copyrighted property of [employer]  or Tima Jeffery  The above information is an  only  It is not intended as medical advice for individual conditions or treatments  Talk to your doctor, nurse or pharmacist before following any medical regimen to see if it is safe and effective for you  Upper Respiratory Infection   AMBULATORY CARE:   An upper respiratory infection  is also called a common cold  It can affect your nose, throat, ears, and sinuses  Common signs and symptoms include the following:  Cold symptoms are usually worst for the first 3 to 5 days  You may have any of the following:  · Runny or stuffy nose    · Sneezing and coughing    · Sore throat or hoarseness    · Red, watery, and sore eyes    · Fatigue     · Chills and fever    · Headache, body aches, or sore muscles  Seek care immediately if:   · You have chest pain or trouble breathing  Contact your healthcare provider if:   · You have a fever over 102ºF (39°C)      · Your sore throat gets worse or you see white or yellow spots in your throat  · Your symptoms get worse after 3 to 5 days or your cold is not better in 14 days  · You have a rash anywhere on your skin  · You have large, tender lumps in your neck  · You have thick, green or yellow drainage from your nose  · You cough up thick yellow, green, or bloody mucus  · You have vomiting for more than 24 hours and cannot keep fluids down  · You have a bad earache  · You have questions or concerns about your condition or care  Treatment for a cold: There is no cure for the common cold  Colds are caused by viruses and do not get better with antibiotics  Most people get better in 7 to 14 days  You may continue to cough for 2 to 3 weeks  The following may help decrease your symptoms:  · Decongestants  help reduce nasal congestion and help you breathe more easily  If you take decongestant pills, they may make you feel restless or not able to sleep  Do not use decongestant sprays for more than a few days  · Cough suppressants  help reduce coughing  Ask your healthcare provider which type of cough medicine is best for you  · NSAIDs , such as ibuprofen, help decrease swelling, pain, and fever  NSAIDs can cause stomach bleeding or kidney problems in certain people  If you take blood thinner medicine, always ask your healthcare provider if NSAIDs are safe for you  Always read the medicine label and follow directions  · Acetaminophen  decreases pain and fever  It is available without a doctor's order  Ask how much to take and how often to take it  Follow directions  Read the labels of all other medicines you are using to see if they also contain acetaminophen, or ask your doctor or pharmacist  Acetaminophen can cause liver damage if not taken correctly  Do not use more than 4 grams (4,000 milligrams) total of acetaminophen in one day  Manage your cold:   · Rest as much as possible  Slowly start to do more each day  · Drink more liquids as directed  Liquids will help thin and loosen mucus so you can cough it up  Liquids will also help prevent dehydration  Liquids that help prevent dehydration include water, fruit juice, and broth  Do not drink liquids that contain caffeine  Caffeine can increase your risk for dehydration  Ask your healthcare provider how much liquid to drink each day  · Soothe a sore throat  Gargle with warm salt water  This helps your sore throat feel better  Make salt water by dissolving ¼ teaspoon salt in 1 cup warm water  You may also suck on hard candy or throat lozenges  You may use a sore throat spray  · Use a humidifier or vaporizer  Use a cool mist humidifier or a vaporizer to increase air moisture in your home  This may make it easier for you to breathe and help decrease your cough  · Use saline nasal drops as directed  These help relieve congestion  · Apply petroleum-based jelly around the outside of your nostrils  This can decrease irritation from blowing your nose  · Do not smoke  Nicotine and other chemicals in cigarettes and cigars can make your symptoms worse  They can also cause infections such as bronchitis or pneumonia  Ask your healthcare provider for information if you currently smoke and need help to quit  E-cigarettes or smokeless tobacco still contain nicotine  Talk to your healthcare provider before you use these products  Prevent spreading your cold to others:   · Try to stay away from other people during the first 2 to 3 days of your cold when it is more easily spread  · Do not share food or drinks  · Do not share hand towels with household members  · Wash your hands often, especially after you blow your nose  Turn away from other people and cover your mouth and nose with a tissue when you sneeze or cough  Follow up with your healthcare provider as directed:  Write down your questions so you remember to ask them during your visits     © 2017 [address] [facility] Information is for End User's use only and may not be sold, redistributed or otherwise used for commercial purposes  All illustrations and images included in CareNotes® are the copyrighted property of [employer]  or Tima Gil  The above information is an  only  It is not intended as medical advice for individual conditions or treatments  Talk to your doctor, nurse or pharmacist before following any medical regimen to see if it is safe and effective for you  Follow up with PCP in 3-5 days  Proceed to  ER if symptoms worsen  Chief Complaint     Chief Complaint   Patient presents with    Cough     cough, body aches ,left ear pain and sore throat for 1 week         History of Present Illness       Patient reports respiratory symptoms x1 week with congestion that is getting worse not better  Over the last day or 2 she has developed throat pain that she states hurts when she swallows  She reports significant left ear pain over the last 24 hours  She states over the last couple days she has had occasional body aches  She expresses concern about the flu sharing that someone else had the flu approximately 3 weeks ago  We briefly discussed incubation period and typical rapid onset of flu verses progressive symptoms that she has been exhibiting  Review of Systems   Review of Systems   Constitutional: Positive for fever  HENT: Positive for congestion, ear pain, postnasal drip and sore throat  Negative for ear discharge, sinus pressure and sinus pain  Musculoskeletal: Positive for arthralgias and myalgias           Current Medications       Current Outpatient Medications:     clonazePAM (KlonoPIN) 0 5 mg tablet, Take 0 5 mg by mouth 2 (two) times a day, Disp: , Rfl:     PARoxetine (PAXIL) 20 mg tablet, Take 20 mg by mouth daily, Disp: , Rfl:     SUMAtriptan (IMITREX) 50 mg tablet, Take 1 tablet (50 mg total) by mouth once as needed for migraine for up to 1 dose, Disp: 9 tablet, Rfl: 0    ALPRAZolam (XANAX) 0 25 mg tablet, Take one tablet by mouth once daily as needed (Patient not taking: Reported on 2020), Disp: 30 tablet, Rfl: 0    aluminum chloride (DRYSOL) 20 % external solution, Apply topically daily at bedtime, Disp: , Rfl:     amoxicillin (AMOXIL) 875 mg tablet, Take 1 tablet (875 mg total) by mouth 2 (two) times a day for 10 days, Disp: 20 tablet, Rfl: 0    ondansetron (ZOFRAN-ODT) 4 mg disintegrating tablet, Take 1 tablet (4 mg total) by mouth every 8 (eight) hours as needed for nausea or vomiting (Patient not taking: Reported on 2019), Disp: 20 tablet, Rfl: 0    predniSONE 20 mg tablet, 3 tabs daily x 3 days, 2 tabs daily x 2 days, 1 tab daily x 2 days, Disp: 15 tablet, Rfl: 0  No current facility-administered medications for this visit  Current Allergies     Allergies as of 2020    (No Known Allergies)            The following portions of the patient's history were reviewed and updated as appropriate: allergies, current medications, past family history, past medical history, past social history, past surgical history and problem list      Past Medical History:   Diagnosis Date    Irritable bowel syndrome     last assessed: 10/23/2015    Maternal obesity, antepartum     last assessed: 2017    Migraine        Past Surgical History:   Procedure Laterality Date     SECTION      FOOT SURGERY         Family History   Problem Relation Age of Onset    Irritable bowel syndrome Mother     Ovarian cancer Family     Ovarian cancer Family          Medications have been verified  Objective   /82   Pulse 85   Temp 99 2 °F (37 3 °C) (Tympanic)   Resp 18   Ht 5' 4" (1 626 m)   Wt 81 2 kg (179 lb)   SpO2 99%   BMI 30 73 kg/m²        Physical Exam     Physical Exam   Constitutional: She is oriented to person, place, and time  She appears well-developed and well-nourished  No distress  HENT:   Head: Normocephalic and atraumatic  Right Ear: Hearing, tympanic membrane, external ear and ear canal normal    Left Ear: Hearing, external ear and ear canal normal  There is swelling and tenderness  No drainage  Tympanic membrane is injected, erythematous and bulging  A middle ear effusion is present  Nose: Mucosal edema and rhinorrhea present  No sinus tenderness  Right sinus exhibits no maxillary sinus tenderness and no frontal sinus tenderness  Left sinus exhibits no maxillary sinus tenderness and no frontal sinus tenderness  Mouth/Throat: Uvula is midline and mucous membranes are normal  Posterior oropharyngeal erythema present  No oropharyngeal exudate, posterior oropharyngeal edema or tonsillar abscesses  Tonsils are 1+ on the right  Tonsils are 1+ on the left  No tonsillar exudate  Eyes: Pupils are equal, round, and reactive to light  Neck: Normal range of motion  Neck supple  Cardiovascular: Normal rate, regular rhythm, S1 normal, S2 normal and normal heart sounds  No extrasystoles are present  Pulmonary/Chest: Effort normal and breath sounds normal  No accessory muscle usage or stridor  No apnea, no tachypnea and no bradypnea  No respiratory distress  She has no decreased breath sounds  She has no wheezes  She has no rhonchi  She has no rales  Abdominal: Soft  Bowel sounds are normal  She exhibits no distension  There is no tenderness  Musculoskeletal: Normal range of motion  Neurological: She is alert and oriented to person, place, and time  Skin: Skin is warm and dry  Capillary refill takes less than 2 seconds  She is not diaphoretic  Psychiatric: She has a normal mood and affect  Her behavior is normal  Judgment and thought content normal    Nursing note and vitals reviewed

## 2020-02-21 ENCOUNTER — TELEPHONE (OUTPATIENT)
Dept: INTERNAL MEDICINE CLINIC | Facility: CLINIC | Age: 30
End: 2020-02-21

## 2020-02-21 DIAGNOSIS — J06.9 UPPER RESPIRATORY TRACT INFECTION, UNSPECIFIED TYPE: Primary | ICD-10-CM

## 2020-02-21 RX ORDER — AZITHROMYCIN 250 MG/1
TABLET, FILM COATED ORAL
Qty: 6 TABLET | Refills: 0 | Status: SHIPPED | OUTPATIENT
Start: 2020-02-21 | End: 2020-02-25

## 2020-02-21 NOTE — TELEPHONE ENCOUNTER
Received a call from [address] [place] [address] [address] stating that she went to [facility] and was diagnosed with an ear infection and URI, they placed her on amox and she started that on 2/17  Pt is having diarrhea now, and is requesting to change the abx to a zpack  She did state that her ear is feeling better  Did talk to Taylor Patel, and she stated that any abx could cause it to happen, that they don't normally provide a zpack for an ear infection, and also that if there are any changes in meds, they normally have them come into the office  She did say she would call the zpack in for her this once  Did review with Rebecca, did stress that any abx could cause diarrhea  Did review above, and pt does want the zpack  Would like sent to [address] [place] [address]

## 2020-02-24 ENCOUNTER — TELEPHONE (OUTPATIENT)
Dept: INTERNAL MEDICINE CLINIC | Facility: CLINIC | Age: 30
End: 2020-02-24

## 2020-02-24 DIAGNOSIS — K64.9 HEMORRHOIDS, UNSPECIFIED HEMORRHOID TYPE: Primary | ICD-10-CM

## 2020-02-24 NOTE — TELEPHONE ENCOUNTER
Received a call from [address] [place] [address] [address] stating that she started on the 2nd abx, and her symptoms worsened  She was having nausea, vomiting, and diarrhea  She also now believes she's having Hemorids  Rebecca stated that she tried OTC stuff and it's not helping  I did let pt know that Alina would need to see her in the office  Did transfer to schedule, and then she told them that she just wanted a new cream for her hemroids, so she didn't schedule

## 2020-02-25 ENCOUNTER — TELEPHONE (OUTPATIENT)
Dept: INTERNAL MEDICINE CLINIC | Facility: CLINIC | Age: 30
End: 2020-02-25

## 2020-02-25 DIAGNOSIS — B37.3 VAGINAL YEAST INFECTION: Primary | ICD-10-CM

## 2020-02-25 RX ORDER — FLUCONAZOLE 150 MG/1
TABLET ORAL
Qty: 3 TABLET | Refills: 0 | Status: SHIPPED | OUTPATIENT
Start: 2020-02-25 | End: 2020-02-27

## 2020-02-25 NOTE — TELEPHONE ENCOUNTER
Patient called w/ complaints of having a vaginal yeast infection and OTC medications not working  She would like you to call diflucan into [facility] pharmacy if your willing  She "thinks" maybe she had an issue with taking the amoxicillin on 2/16 and got it from that

## 2020-02-26 ENCOUNTER — OFFICE VISIT (OUTPATIENT)
Dept: INTERNAL MEDICINE CLINIC | Facility: CLINIC | Age: 30
End: 2020-02-26
Payer: COMMERCIAL

## 2020-02-26 ENCOUNTER — TELEPHONE (OUTPATIENT)
Dept: ADMINISTRATIVE | Facility: OTHER | Age: 30
End: 2020-02-26

## 2020-02-26 VITALS
RESPIRATION RATE: 14 BRPM | OXYGEN SATURATION: 96 % | BODY MASS INDEX: 30.34 KG/M2 | WEIGHT: 177.7 LBS | HEART RATE: 97 BPM | HEIGHT: 64 IN | TEMPERATURE: 98.2 F | SYSTOLIC BLOOD PRESSURE: 122 MMHG | DIASTOLIC BLOOD PRESSURE: 74 MMHG

## 2020-02-26 DIAGNOSIS — R21 RASH: ICD-10-CM

## 2020-02-26 PROCEDURE — 3008F BODY MASS INDEX DOCD: CPT | Performed by: NURSE PRACTITIONER

## 2020-02-26 PROCEDURE — 99213 OFFICE O/P EST LOW 20 MIN: CPT | Performed by: NURSE PRACTITIONER

## 2020-02-26 PROCEDURE — 96372 THER/PROPH/DIAG INJ SC/IM: CPT | Performed by: NURSE PRACTITIONER

## 2020-02-26 PROCEDURE — 1036F TOBACCO NON-USER: CPT | Performed by: NURSE PRACTITIONER

## 2020-02-26 RX ORDER — CETIRIZINE HYDROCHLORIDE 10 MG/1
10 TABLET ORAL DAILY
Qty: 30 TABLET | Refills: 3 | Status: SHIPPED | OUTPATIENT
Start: 2020-02-26 | End: 2021-01-13 | Stop reason: HOSPADM

## 2020-02-26 RX ORDER — METHYLPREDNISOLONE 4 MG/1
TABLET ORAL
Qty: 21 EACH | Refills: 0 | Status: SHIPPED | OUTPATIENT
Start: 2020-02-26 | End: 2021-01-13 | Stop reason: HOSPADM

## 2020-02-26 RX ORDER — DEXAMETHASONE SODIUM PHOSPHATE 4 MG/ML
4 INJECTION, SOLUTION INTRA-ARTICULAR; INTRALESIONAL; INTRAMUSCULAR; INTRAVENOUS; SOFT TISSUE ONCE
Status: COMPLETED | OUTPATIENT
Start: 2020-02-26 | End: 2020-02-26

## 2020-02-26 RX ADMIN — DEXAMETHASONE SODIUM PHOSPHATE 4 MG: 4 INJECTION, SOLUTION INTRA-ARTICULAR; INTRALESIONAL; INTRAMUSCULAR; INTRAVENOUS; SOFT TISSUE at 13:11

## 2020-02-26 NOTE — PROGRESS NOTES
Assessment/Plan: Will give IM decadron today in the office  Will call in a Medrol dose pack and Zyrtec 10 mg daily  Rash is most likely from Zithromax  She was advised if any issues or concerns to call the office  No problem-specific Assessment & Plan notes found for this encounter  Problem List Items Addressed This Visit        Other    BMI 30 0-30 9,adult - Primary      Other Visit Diagnoses     Rash        Relevant Medications    methylPREDNISolone 4 MG tablet therapy pack    cetirizine (ZyrTEC) 10 mg tablet    dexamethasone (DECADRON) injection 4 mg (Completed) (Start on 2020  1:15 PM)            Subjective:      Patient ID: Promise Salmon is a 34 y o  female  Raquel Fraction is here today for an acute visit  She was put on Amoxicillin by the  and stopped in on Thursday due to diarrhea  She states she did take the Diflucan and then did start Zithromax on Friday  She states this am she woke up and had a rash all over her body  She states it is not itchy but is getting worse  She has had Zithromax in the past with no issues  She denies any throat or tongue swelling  She offers no other issues  The following portions of the patient's history were reviewed and updated as appropriate:   She  has a past medical history of Irritable bowel syndrome, Maternal obesity, antepartum, and Migraine  She   Patient Active Problem List    Diagnosis Date Noted    BMI 30 0-30 9,adult 2020    Migraine with status migrainosus, not intractable 2019    Cluster headache, not intractable 2019    Hyperhidrosis 10/04/2018    Anxiety and depression 2018    Sweating abnormality 2018    Anxiety 2018    Current mild episode of major depressive disorder without prior episode ([facility] [address] ) 2018    Irritable bowel syndrome with both constipation and diarrhea 2017    Menorrhagia 2017     She  has a past surgical history that includes  section and Foot surgery    Her family history includes Irritable bowel syndrome in her mother; Ovarian cancer in her family and family  She  reports that she has never smoked  She has never used smokeless tobacco  She reports that she does not drink alcohol or use drugs  Current Outpatient Medications   Medication Sig Dispense Refill    aluminum chloride (DRYSOL) 20 % external solution Apply topically daily at bedtime      clonazePAM (KlonoPIN) 0 5 mg tablet Take 0 5 mg by mouth 2 (two) times a day      fluconazole (DIFLUCAN) 150 mg tablet Take one tablet by mouth daily for 3 days 3 tablet 0    hydrocortisone (ANUSOL-HC, PROCTOSOL HC,) 2 5 % rectal cream Insert into the rectum 2 (two) times a day 30 g 0    PARoxetine (PAXIL) 20 mg tablet Take 20 mg by mouth daily      cetirizine (ZyrTEC) 10 mg tablet Take 1 tablet (10 mg total) by mouth daily 30 tablet 3    methylPREDNISolone 4 MG tablet therapy pack Use as directed on package 21 each 0     No current facility-administered medications for this visit        Current Outpatient Medications on File Prior to Visit   Medication Sig    aluminum chloride (DRYSOL) 20 % external solution Apply topically daily at bedtime    clonazePAM (KlonoPIN) 0 5 mg tablet Take 0 5 mg by mouth 2 (two) times a day    fluconazole (DIFLUCAN) 150 mg tablet Take one tablet by mouth daily for 3 days    hydrocortisone (ANUSOL-HC, PROCTOSOL HC,) 2 5 % rectal cream Insert into the rectum 2 (two) times a day    PARoxetine (PAXIL) 20 mg tablet Take 20 mg by mouth daily    [] azithromycin (ZITHROMAX) 250 mg tablet Take 2 tablets today then 1 tablet daily x 4 days    [DISCONTINUED] ALPRAZolam (XANAX) 0 25 mg tablet Take one tablet by mouth once daily as needed (Patient not taking: Reported on 2020)    [DISCONTINUED] ondansetron (ZOFRAN-ODT) 4 mg disintegrating tablet Take 1 tablet (4 mg total) by mouth every 8 (eight) hours as needed for nausea or vomiting (Patient not taking: Reported on 2019)    [DISCONTINUED] predniSONE 20 mg tablet 3 tabs daily x 3 days, 2 tabs daily x 2 days, 1 tab daily x 2 days (Patient not taking: Reported on 2/26/2020)    [DISCONTINUED] SUMAtriptan (IMITREX) 50 mg tablet Take 1 tablet (50 mg total) by mouth once as needed for migraine for up to 1 dose (Patient not taking: Reported on 2/26/2020)     No current facility-administered medications on file prior to visit  She is allergic to azithromycin       Review of Systems   Constitutional: Negative  HENT: Negative  Eyes: Negative  Respiratory: Negative  Cardiovascular: Negative  Gastrointestinal: Negative  Endocrine: Negative  Genitourinary: Negative  Musculoskeletal: Negative  Skin: Positive for rash  Allergic/Immunologic: Negative  Neurological: Negative  Hematological: Negative  Psychiatric/Behavioral: Negative  Objective:      /74 (BP Location: Right arm, Patient Position: Sitting, Cuff Size: Adult)   Pulse 97   Temp 98 2 °F (36 8 °C) (Temporal)   Resp 14   Ht 5' 4" (1 626 m)   Wt 80 6 kg (177 lb 11 2 oz)   SpO2 96%   BMI 30 50 kg/m²          Physical Exam   Constitutional: She is oriented to person, place, and time  She appears well-developed and well-nourished  HENT:   Head: Normocephalic and atraumatic  Right Ear: External ear normal    Left Ear: External ear normal    Nose: Nose normal    Mouth/Throat: Oropharynx is clear and moist    Eyes: Pupils are equal, round, and reactive to light  Conjunctivae and EOM are normal    Neck: Normal range of motion  Neck supple  Cardiovascular: Normal rate, regular rhythm, normal heart sounds and intact distal pulses  Pulmonary/Chest: Effort normal and breath sounds normal    Abdominal: Soft  Bowel sounds are normal    Musculoskeletal: Normal range of motion  Neurological: She is alert and oriented to person, place, and time  Skin: Skin is warm and dry  Capillary refill takes less than 2 seconds  Rash noted  Generalized to body   Psychiatric: She has a normal mood and affect  Her behavior is normal  Judgment and thought content normal    Vitals reviewed  BMI Counseling: Body mass index is 30 5 kg/m²  The BMI is above normal  Nutrition recommendations include reducing portion sizes, decreasing overall calorie intake, 3-5 servings of fruits/vegetables daily, reducing fast food intake, consuming healthier snacks, decreasing soda and/or juice intake, moderation in carbohydrate intake, increasing intake of lean protein, reducing intake of saturated fat and trans fat and reducing intake of cholesterol

## 2020-02-26 NOTE — PATIENT INSTRUCTIONS

## 2020-02-26 NOTE — TELEPHONE ENCOUNTER
----- Message from [address] [place] [address] sent at 2/26/2020 12:53 PM EST -----  Can you please result patients GYN exam under encounters with [facility]      Thanks Rakesh Dalal

## 2020-02-26 NOTE — TELEPHONE ENCOUNTER
Upon review of the In Basket request we were able to locate, review, and update the patient chart as requested for Pap Smear (HPV) aka Cervical Cancer Screening  Any additional questions or concerns should be emailed to the Practice Liaisons via [email or web address] email, please do not reply via In Basket      Thank you  Ottoniel Looney

## 2020-10-15 DIAGNOSIS — K64.4 EXTERNAL HEMORRHOID: Primary | ICD-10-CM

## 2020-10-15 RX ORDER — HYDROCORTISONE 25 MG/G
CREAM TOPICAL 2 TIMES DAILY
Qty: 28 G | Refills: 3 | Status: SHIPPED | OUTPATIENT
Start: 2020-10-15 | End: 2021-09-14 | Stop reason: ALTCHOICE

## 2020-11-09 ENCOUNTER — TELEMEDICINE (OUTPATIENT)
Dept: INTERNAL MEDICINE CLINIC | Facility: CLINIC | Age: 30
End: 2020-11-09
Payer: COMMERCIAL

## 2020-11-09 VITALS — HEART RATE: 98 BPM | TEMPERATURE: 99.7 F | OXYGEN SATURATION: 97 %

## 2020-11-09 DIAGNOSIS — Z20.828 EXPOSURE TO SARS-ASSOCIATED CORONAVIRUS: Primary | ICD-10-CM

## 2020-11-09 PROBLEM — N84.0 ENDOMETRIAL POLYP: Status: ACTIVE | Noted: 2020-06-26

## 2020-11-09 PROCEDURE — U0003 INFECTIOUS AGENT DETECTION BY NUCLEIC ACID (DNA OR RNA); SEVERE ACUTE RESPIRATORY SYNDROME CORONAVIRUS 2 (SARS-COV-2) (CORONAVIRUS DISEASE [COVID-19]), AMPLIFIED PROBE TECHNIQUE, MAKING USE OF HIGH THROUGHPUT TECHNOLOGIES AS DESCRIBED BY CMS-2020-01-R: HCPCS | Performed by: FAMILY MEDICINE

## 2020-11-09 PROCEDURE — G2012 BRIEF CHECK IN BY MD/QHP: HCPCS | Performed by: FAMILY MEDICINE

## 2020-11-10 LAB — SARS-COV-2 RNA SPEC QL NAA+PROBE: NOT DETECTED

## 2021-01-12 ENCOUNTER — HOSPITAL ENCOUNTER (OUTPATIENT)
Facility: HOSPITAL | Age: 31
Setting detail: OBSERVATION
LOS: 1 days | Discharge: HOME | End: 2021-01-13
Attending: OBSTETRICS & GYNECOLOGY | Admitting: OBSTETRICS & GYNECOLOGY
Payer: COMMERCIAL

## 2021-01-12 ENCOUNTER — HOSPITAL ENCOUNTER (EMERGENCY)
Facility: HOSPITAL | Age: 31
End: 2021-01-12
Attending: FAMILY MEDICINE | Admitting: FAMILY MEDICINE
Payer: COMMERCIAL

## 2021-01-12 ENCOUNTER — APPOINTMENT (EMERGENCY)
Dept: CT IMAGING | Facility: HOSPITAL | Age: 31
End: 2021-01-12
Payer: COMMERCIAL

## 2021-01-12 VITALS
TEMPERATURE: 98 F | RESPIRATION RATE: 16 BRPM | WEIGHT: 198 LBS | DIASTOLIC BLOOD PRESSURE: 52 MMHG | HEIGHT: 64 IN | HEART RATE: 82 BPM | SYSTOLIC BLOOD PRESSURE: 104 MMHG | BODY MASS INDEX: 33.8 KG/M2 | OXYGEN SATURATION: 99 %

## 2021-01-12 DIAGNOSIS — I26.99 PULMONARY EMBOLISM (HCC): Primary | ICD-10-CM

## 2021-01-12 DIAGNOSIS — R07.9 CHEST PAIN: ICD-10-CM

## 2021-01-12 PROBLEM — Z3A.12 12 WEEKS GESTATION OF PREGNANCY: Status: ACTIVE | Noted: 2021-01-12

## 2021-01-12 LAB
ANION GAP SERPL CALCULATED.3IONS-SCNC: 9 MMOL/L (ref 4–13)
ATRIAL RATE: 70 BPM
BASOPHILS # BLD AUTO: 0 THOUSANDS/ΜL (ref 0–0.1)
BASOPHILS NFR BLD AUTO: 0 % (ref 0–2)
BUN SERPL-MCNC: 7 MG/DL (ref 7–25)
CALCIUM SERPL-MCNC: 8.9 MG/DL (ref 8.6–10.5)
CHLORIDE SERPL-SCNC: 102 MMOL/L (ref 98–107)
CO2 SERPL-SCNC: 24 MMOL/L (ref 21–31)
CREAT SERPL-MCNC: 0.66 MG/DL (ref 0.6–1.2)
D DIMER PPP FEU-MCNC: 0.87 UG/ML FEU
EOSINOPHIL # BLD AUTO: 0.2 THOUSAND/ΜL (ref 0–0.61)
EOSINOPHIL NFR BLD AUTO: 2 % (ref 0–5)
ERYTHROCYTE [DISTWIDTH] IN BLOOD BY AUTOMATED COUNT: 11.8 % (ref 11.5–14.5)
FLUAV RNA RESP QL NAA+PROBE: NEGATIVE
FLUBV RNA RESP QL NAA+PROBE: NEGATIVE
GFR SERPL CREATININE-BSD FRML MDRD: 121 ML/MIN/1.73SQ M
GLUCOSE SERPL-MCNC: 93 MG/DL (ref 65–99)
HCT VFR BLD AUTO: 37.3 % (ref 42–47)
HGB BLD-MCNC: 12.9 G/DL (ref 12–16)
INR PPP: 1 (ref 0.84–1.19)
LYMPHOCYTES # BLD AUTO: 1.8 THOUSANDS/ΜL (ref 0.6–4.47)
LYMPHOCYTES NFR BLD AUTO: 17 % (ref 21–51)
MCH RBC QN AUTO: 31.6 PG (ref 26–34)
MCHC RBC AUTO-ENTMCNC: 34.6 G/DL (ref 31–37)
MCV RBC AUTO: 91 FL (ref 81–99)
MONOCYTES # BLD AUTO: 0.5 THOUSAND/ΜL (ref 0.17–1.22)
MONOCYTES NFR BLD AUTO: 4 % (ref 2–12)
NEUTROPHILS # BLD AUTO: 8.3 THOUSANDS/ΜL (ref 1.4–6.5)
NEUTS SEG NFR BLD AUTO: 77 % (ref 42–75)
P AXIS: 62 DEGREES
PLATELET # BLD AUTO: 266 THOUSANDS/UL (ref 149–390)
PMV BLD AUTO: 8.3 FL (ref 8.6–11.7)
POTASSIUM SERPL-SCNC: 3.8 MMOL/L (ref 3.5–5.5)
PR INTERVAL: 152 MS
PROTHROMBIN TIME: 13.1 SECONDS (ref 11.6–14.5)
QRS AXIS: 62 DEGREES
QRSD INTERVAL: 72 MS
QT INTERVAL: 396 MS
QTC INTERVAL: 427 MS
RBC # BLD AUTO: 4.08 MILLION/UL (ref 3.9–5.2)
RSV RNA RESP QL NAA+PROBE: NEGATIVE
SARS-COV-2 RNA RESP QL NAA+PROBE: NEGATIVE
SODIUM SERPL-SCNC: 135 MMOL/L (ref 134–143)
T WAVE AXIS: 52 DEGREES
TROPONIN I SERPL-MCNC: <0.03 NG/ML
VENTRICULAR RATE: 70 BPM
WBC # BLD AUTO: 10.8 THOUSAND/UL (ref 4.8–10.8)

## 2021-01-12 PROCEDURE — 80048 BASIC METABOLIC PNL TOTAL CA: CPT | Performed by: FAMILY MEDICINE

## 2021-01-12 PROCEDURE — G0379 DIRECT REFER HOSPITAL OBSERV: HCPCS

## 2021-01-12 PROCEDURE — 96374 THER/PROPH/DIAG INJ IV PUSH: CPT

## 2021-01-12 PROCEDURE — 0241U HB NFCT DS VIR RESP RNA 4 TRGT: CPT | Performed by: FAMILY MEDICINE

## 2021-01-12 PROCEDURE — 85379 FIBRIN DEGRADATION QUANT: CPT | Performed by: FAMILY MEDICINE

## 2021-01-12 PROCEDURE — 84484 ASSAY OF TROPONIN QUANT: CPT | Performed by: FAMILY MEDICINE

## 2021-01-12 PROCEDURE — NC001 PR NO CHARGE: Performed by: OBSTETRICS & GYNECOLOGY

## 2021-01-12 PROCEDURE — 71275 CT ANGIOGRAPHY CHEST: CPT

## 2021-01-12 PROCEDURE — 85025 COMPLETE CBC W/AUTO DIFF WBC: CPT | Performed by: FAMILY MEDICINE

## 2021-01-12 PROCEDURE — G1004 CDSM NDSC: HCPCS

## 2021-01-12 PROCEDURE — 85610 PROTHROMBIN TIME: CPT | Performed by: FAMILY MEDICINE

## 2021-01-12 PROCEDURE — 96372 THER/PROPH/DIAG INJ SC/IM: CPT

## 2021-01-12 PROCEDURE — 93005 ELECTROCARDIOGRAM TRACING: CPT

## 2021-01-12 PROCEDURE — 36415 COLL VENOUS BLD VENIPUNCTURE: CPT | Performed by: FAMILY MEDICINE

## 2021-01-12 PROCEDURE — 99285 EMERGENCY DEPT VISIT HI MDM: CPT | Performed by: FAMILY MEDICINE

## 2021-01-12 PROCEDURE — 99285 EMERGENCY DEPT VISIT HI MDM: CPT

## 2021-01-12 PROCEDURE — 93010 ELECTROCARDIOGRAM REPORT: CPT | Performed by: INTERNAL MEDICINE

## 2021-01-12 PROCEDURE — 96375 TX/PRO/DX INJ NEW DRUG ADDON: CPT

## 2021-01-12 RX ORDER — ONDANSETRON 2 MG/ML
4 INJECTION INTRAMUSCULAR; INTRAVENOUS EVERY 6 HOURS PRN
Status: DISCONTINUED | OUTPATIENT
Start: 2021-01-12 | End: 2021-01-13

## 2021-01-12 RX ORDER — ONDANSETRON 2 MG/ML
4 INJECTION INTRAMUSCULAR; INTRAVENOUS ONCE
Status: COMPLETED | OUTPATIENT
Start: 2021-01-12 | End: 2021-01-12

## 2021-01-12 RX ORDER — ACETAMINOPHEN 325 MG/1
975 TABLET ORAL EVERY 6 HOURS PRN
Status: DISCONTINUED | OUTPATIENT
Start: 2021-01-12 | End: 2021-01-13 | Stop reason: HOSPADM

## 2021-01-12 RX ORDER — MORPHINE SULFATE 4 MG/ML
4 INJECTION, SOLUTION INTRAMUSCULAR; INTRAVENOUS ONCE
Status: COMPLETED | OUTPATIENT
Start: 2021-01-12 | End: 2021-01-12

## 2021-01-12 RX ORDER — SODIUM CHLORIDE, SODIUM LACTATE, POTASSIUM CHLORIDE, CALCIUM CHLORIDE 600; 310; 30; 20 MG/100ML; MG/100ML; MG/100ML; MG/100ML
125 INJECTION, SOLUTION INTRAVENOUS CONTINUOUS
Status: DISCONTINUED | OUTPATIENT
Start: 2021-01-12 | End: 2021-01-13 | Stop reason: HOSPADM

## 2021-01-12 RX ADMIN — ONDANSETRON 4 MG: 2 INJECTION INTRAMUSCULAR; INTRAVENOUS at 17:10

## 2021-01-12 RX ADMIN — ENOXAPARIN SODIUM 90 MG: 100 INJECTION SUBCUTANEOUS at 15:49

## 2021-01-12 RX ADMIN — SODIUM CHLORIDE, SODIUM LACTATE, POTASSIUM CHLORIDE, AND CALCIUM CHLORIDE 125 ML/HR: .6; .31; .03; .02 INJECTION, SOLUTION INTRAVENOUS at 23:36

## 2021-01-12 RX ADMIN — IOHEXOL 100 ML: 350 INJECTION, SOLUTION INTRAVENOUS at 13:36

## 2021-01-12 RX ADMIN — MORPHINE SULFATE 4 MG: 4 INJECTION INTRAVENOUS at 17:10

## 2021-01-12 RX ADMIN — ONDANSETRON 4 MG: 2 INJECTION INTRAMUSCULAR; INTRAVENOUS at 23:36

## 2021-01-12 RX ADMIN — ACETAMINOPHEN 975 MG: 325 TABLET ORAL at 23:31

## 2021-01-12 NOTE — ED PROVIDER NOTES
History  Chief Complaint   Patient presents with    Cough     chest discomfort, history of costochondritis, pregnant 12 weeks  HPI  This is a 62-year-old female G5 P for presented ED with complain of chest tightness radiating to her back  Patient states her symptoms started about 4 days ago and since then has been getting worse  Patient states she has been having intermittent chest pain currently rating 6/10  She states she took Tylenol without any improvement  Patient is a 12 weeks pregnant  She states that she called her OB gyn who recommended to come to the hospital   She states her pain is radiating to her back and is worse when she is laying down  She is also complaining of right lower chest pain  She denies any trouble breathing except when she is lying down  She denies any nausea vomiting at this time  Denies any exposure to COVID-19  She did does complain of some cough  Prior to Admission Medications   Prescriptions Last Dose Informant Patient Reported? Taking?   aluminum chloride (DRYSOL) 20 % external solution  Self Yes No   Sig: Apply topically daily at bedtime   hydrocortisone (ANUSOL-HC) 2 5 % rectal cream   No No   Sig: Apply topically 2 (two) times a day   Patient not taking: Reported on 2021      Facility-Administered Medications: None       Past Medical History:   Diagnosis Date    Irritable bowel syndrome     last assessed: 10/23/2015    Maternal obesity, antepartum     last assessed: 2017    Migraine        Past Surgical History:   Procedure Laterality Date     SECTION      FOOT SURGERY         Family History   Problem Relation Age of Onset    Irritable bowel syndrome Mother     Ovarian cancer Family     Ovarian cancer Family      I have reviewed and agree with the history as documented      E-Cigarette/Vaping    E-Cigarette Use Never User      E-Cigarette/Vaping Substances     Social History     Tobacco Use    Smoking status: Never Smoker    Smokeless tobacco: Never Used   Substance Use Topics    Alcohol use: No    Drug use: No       Review of Systems   Constitutional: Negative  HENT: Negative  Eyes: Negative  Respiratory: Positive for chest tightness and shortness of breath  Cardiovascular: Negative  Gastrointestinal: Negative  Endocrine: Negative  Genitourinary: Negative  Musculoskeletal: Negative  Skin: Negative  Allergic/Immunologic: Negative  Neurological: Negative  Psychiatric/Behavioral: Negative  Physical Exam  Physical Exam  Vitals signs and nursing note reviewed  Constitutional:       Appearance: She is well-developed  HENT:      Head: Normocephalic and atraumatic  Right Ear: External ear normal       Left Ear: External ear normal       Nose: Nose normal       Mouth/Throat:      Pharynx: No oropharyngeal exudate  Eyes:      General: No scleral icterus  Right eye: No discharge  Left eye: No discharge  Conjunctiva/sclera: Conjunctivae normal       Pupils: Pupils are equal, round, and reactive to light  Neck:      Musculoskeletal: Normal range of motion and neck supple  Cardiovascular:      Rate and Rhythm: Normal rate and regular rhythm  Pulmonary:      Effort: Pulmonary effort is normal  No respiratory distress  Breath sounds: Decreased breath sounds present  No wheezing  Abdominal:      General: Bowel sounds are normal       Palpations: Abdomen is soft  Musculoskeletal: Normal range of motion  Lymphadenopathy:      Cervical: No cervical adenopathy  Skin:     General: Skin is warm and dry  Capillary Refill: Capillary refill takes less than 2 seconds  Neurological:      Mental Status: She is alert and oriented to person, place, and time     Psychiatric:         Behavior: Behavior normal          Vital Signs  ED Triage Vitals [01/12/21 1016]   Temperature Pulse Respirations Blood Pressure SpO2   97 5 °F (36 4 °C) 82 18 117/70 100 %      Temp Source Heart Rate Source Patient Position - Orthostatic VS BP Location FiO2 (%)   Temporal Monitor Sitting Left arm --      Pain Score       6           Vitals:    01/12/21 1830 01/12/21 1845 01/12/21 1930 01/12/21 1945   BP: 107/55  104/52    Pulse: 79 81 76 82   Patient Position - Orthostatic VS:             Visual Acuity      ED Medications  Medications   iohexol (OMNIPAQUE) 350 MG/ML injection (MULTI-DOSE) 100 mL (100 mL Intravenous Given 1/12/21 1336)   enoxaparin (LOVENOX) subcutaneous injection 90 mg (90 mg Subcutaneous Given 1/12/21 1549)   morphine (PF) 4 mg/mL injection 4 mg (4 mg Intravenous Given 1/12/21 1710)   ondansetron (ZOFRAN) injection 4 mg (4 mg Intravenous Given 1/12/21 1710)       Diagnostic Studies  Results Reviewed     Procedure Component Value Units Date/Time    Protime-INR [[id number]]  (Normal) Collected: 01/12/21 1117    Lab Status: Final result Specimen: Blood from Arm, Left Updated: 01/12/21 1523     Protime 13 1 seconds      INR 1 00    COVID[facility], [facility]/B, [facility] [[id number]]  (Normal) Collected: 01/12/21 1106    Lab Status: Final result Specimen: Nares from Nasopharyngeal Swab Updated: 01/12/21 1154     SARS-CoV-2 Negative     INFLUENZA A PCR Negative     INFLUENZA B PCR Negative     RSV PCR Negative    Narrative: This test has been authorized by FDA under an EUA (Emergency Use Assay) for use by authorized laboratories  Clinical caution and judgement should be used with the interpretation of these results with consideration of the clinical impression and other laboratory testing  Testing reported as "Positive" or "Negative" has been proven to be accurate according to standard laboratory validation requirements  All testing is performed with control materials showing appropriate reactivity at standard intervals      D-dimer, quantitative [[id number]]  (Abnormal) Collected: 01/12/21 1117    Lab Status: Final result Specimen: Blood from Arm, Left Updated: 01/12/21 1145     D-Dimer, Quant 0 87 ug/ml FEU     Troponin I [[id number]]  (Normal) Collected: 01/12/21 1117    Lab Status: Final result Specimen: Blood from Arm, Left Updated: 01/12/21 1144     Troponin I <0 03 ng/mL     Basic metabolic panel [[id number]] Collected: 01/12/21 1117    Lab Status: Final result Specimen: Blood from Arm, Left Updated: 01/12/21 1143     Sodium 135 mmol/L      Potassium 3 8 mmol/L      Chloride 102 mmol/L      CO2 24 mmol/L      ANION GAP 9 mmol/L      BUN 7 mg/dL      Creatinine 0 66 mg/dL      Glucose 93 mg/dL      Calcium 8 9 mg/dL      eGFR 121 ml/min/1 73sq m     Narrative:      [place] guidelines for Chronic Kidney Disease (CKD):     Stage 1 with normal or high GFR (GFR > 90 mL/min/1 73 square meters)    Stage 2 Mild CKD (GFR = 60-89 mL/min/1 73 square meters)    Stage 3A Moderate CKD (GFR = 45-59 mL/min/1 73 square meters)    Stage 3B Moderate CKD (GFR = 30-44 mL/min/1 73 square meters)    Stage 4 Severe CKD (GFR = 15-29 mL/min/1 73 square meters)    Stage 5 End Stage CKD (GFR <15 mL/min/1 73 square meters)  Note: GFR calculation is accurate only with a steady state creatinine    CBC and differential [[id number]]  (Abnormal) Collected: 01/12/21 1117    Lab Status: Final result Specimen: Blood from Arm, Left Updated: 01/12/21 1139     WBC 10 80 Thousand/uL      RBC 4 08 Million/uL      Hemoglobin 12 9 g/dL      Hematocrit 37 3 %      MCV 91 fL      MCH 31 6 pg      MCHC 34 6 g/dL      RDW 11 8 %      MPV 8 3 fL      Platelets 174 Thousands/uL      Neutrophils Relative 77 %      Lymphocytes Relative 17 %      Monocytes Relative 4 %      Eosinophils Relative 2 %      Basophils Relative 0 %      Neutrophils Absolute 8 30 Thousands/µL      Lymphocytes Absolute 1 80 Thousands/µL      Monocytes Absolute 0 50 Thousand/µL      Eosinophils Absolute 0 20 Thousand/µL      Basophils Absolute 0 00 Thousands/µL                  CTA ED chest PE Study   Final Result by Yusuf Melo MD (01/12 1403)      Suboptimal timing of the contrast bolus for assessment of pulmonary embolism  Within the limitations of the examination, the following findings are noted  Occlusive segmental branch pulmonary embolism in the right anterior lower lobe  Focal consolidation at the anterior right lower lobe lung base may represent a focal pulmonary infarction and/or atelectasis  I personally discussed this study with JAMAL REN on 1/12/2021 at 2:03 PM                            Workstation performed: [id number]                    Procedures  Procedures         ED Course  ED Course as of Feb 22 1447   Tue Jan 12, 2021   1311 Lab result discussed with patient positive D-dimer patient called her OB and spoke with Abisai Ray nurse practitioner in [facility] gyn office who stated that the patient is okay to get CTA chest to rule out a pulmonary embolism  Precautions are provided to the patient discuss benefit and side effect of getting CTA chest with pregnancy  Patient understand the risk factor and agree with the CT chest       1407 CTA Results discuss with pt       1420 Discuss with Dr Charles Palmer  accepted the transfer       [phone] Discuss with Dr Lissa De Los Santos states he is not comfortable keeping 12 weeks pregnant patient at [facility]  [address] with Dr Bebe Valdez recommended to start patient on Lovenox 1mg/kg12 give 1st dose now  Accepted to transfer to [facility]  1636 Patient had sudden onset of chest pain shortness of breath requesting for stronger pain medication  Out had 1 episode of hypoxia then oxygen saturation came up to 98% on room air  Patient is started on IV fluid  [address] [address] [facility] Discussed with Dr Denver Romance agree with giving stronger pain medication like narcotics  Discussed with patient side effect and benefit discussed patient agrees    She is rating her right-sided chest pain 10/10       1637 Pending transfer                  HEART Risk Score      Most Recent Value   Heart Score Risk Calculator   History  1 Filed at: 02/22/2021 1446   ECG  0 Filed at: 02/22/2021 1446   Age  0 Filed at: 02/22/2021 1446   Risk Factors  0 Filed at: 02/22/2021 1446   Troponin  0 Filed at: 02/22/2021 1446   HEART Score  1 Filed at: 02/22/2021 1446                                    MDM  Number of Diagnoses or Management Options  Chest pain:   Pulmonary embolism [facility]):   Diagnosis management comments: CT shows right lower lobe pulmonary embolism with a questionable infarct  Discussed with OBGYN on-call Dr Rodolfo Orosco recommended Lovenox accepted  transfer to [facility]  Patient vitals are stable patient agree with the plan and treatment        Disposition  Final diagnoses:   Pulmonary embolism ([address] )   Chest pain     Time reflects when diagnosis was documented in both MDM as applicable and the Disposition within this note     Time User Action Codes Description Comment    1/12/2021  2:43 PM Danitza Leonard [I26 [id number]] Pulmonary embolism ([facility] [address] )     1/12/2021  2:44 PM Cecile Longo [R07 [id number]] Chest pain       ED Disposition     ED Disposition Condition Date/Time Comment    Transfer to Another Facility-In Network  Tu[place]nie Jan 12, 2021  2:39 PM Candida Magallanes should be transferred out to [facility]         MD Documentation      Most Recent Value   Patient Condition  The patient has been stabilized such that within reasonable medical probability, no material deterioration of the patient condition or the condition of the unborn child(shelia) is likely to result from the transfer   Reason for Transfer  Level of Care needed not available at this facility   Benefits of Transfer  Specialized equipment and/or services available at the receiving facility (Include comment)________________________   Risks of Transfer  Potential for delay in receiving treatment, Potential deterioration of medical condition, Possible worsening of condition or death during transfer   Accepting Physician  Dr Mak Forbes Name, Macarena Reyes       RN Documentation      [address] Name, Macarena Araya    Report Given to  HELLEN Og      Follow-up Information    None         Discharge Medication List as of 1/12/2021  8:18 PM      CONTINUE these medications which have NOT CHANGED    Details   aluminum chloride (DRYSOL) 20 % external solution Apply topically daily at bedtime, Historical Med      hydrocortisone (ANUSOL-HC) 2 5 % rectal cream Apply topically 2 (two) times a day, Starting Thu 10/15/2020, Normal      cetirizine (ZyrTEC) 10 mg tablet Take 1 tablet (10 mg total) by mouth daily, Starting Wed 2/26/2020, Normal      clonazePAM (KlonoPIN) 0 5 mg tablet Take 0 5 mg by mouth 2 (two) times a day, Historical Med      methylPREDNISolone 4 MG tablet therapy pack Use as directed on package, Normal      PARoxetine (PAXIL) 20 mg tablet Take 20 mg by mouth daily, Historical Med           No discharge procedures on file      PDMP Review     None          ED Provider  Electronically Signed by           Soren Inman MD  01/12/21 Kalen Gerard MD  01/12/21 [address] Melba MD  02/22/21 [id number]

## 2021-01-12 NOTE — EMTALA/ACUTE CARE TRANSFER
[address]  [place] [address] [place] [id number]  [phone]  Dept: [phone]      EMTALA TRANSFER CONSENT    NAME Dennis MENDEZ 1990                              MRN [id number]    I have been informed of my rights regarding examination, treatment, and transfer   by Dr Ruth Mcdonough MD    Benefits:      Risks:        Consent for Transfer:  I acknowledge that my medical condition has been evaluated and explained to me by the emergency department physician or other qualified medical person and/or my attending physician, who has recommended that I be transferred to the service of    at    The above potential benefits of such transfer, the potential risks associated with such transfer, and the probable risks of not being transferred have been explained to me, and I fully understand them  The doctor has explained that, in my case, the benefits of transfer outweigh the risks  I agree to be transferred  I authorize the performance of emergency medical procedures and treatments upon me in both transit and upon arrival at the receiving facility  Additionally, I authorize the release of any and all medical records to the receiving facility and request they be transported with me, if possible  I understand that the safest mode of transportation during a medical emergency is an ambulance and that the Hospital advocates the use of this mode of transport  Risks of traveling to the receiving facility by car, including absence of medical control, life sustaining equipment, such as oxygen, and medical personnel has been explained to me and I fully understand them  (PRETTY CORRECT BOX BELOW)  [  ]  I consent to the stated transfer and to be transported by ambulance/helicopter  [  ]  I consent to the stated transfer, but refuse transportation by ambulance and accept full responsibility for my transportation by car    I understand the risks of non-ambulance transfers and I exonerate the Hospital and its staff from any deterioration in my condition that results from this refusal     X___________________________________________    DATE  21  TIME________  Signature of patient or legally responsible individual signing on patient behalf           RELATIONSHIP TO PATIENT_________________________          Provider Certification    NAME Lilian Carrel                                         1990                              MRN [id number]    A medical screening exam was performed on the above named patient  Based on the examination:    Condition Necessitating Transfer The primary encounter diagnosis was Pulmonary embolism ([address] )  A diagnosis of Chest pain was also pertinent to this visit  Patient Condition:      Reason for Transfer:      Transfer Requirements: Facility     · Space available and qualified personnel available for treatment as acknowledged by    · Agreed to accept transfer and to provide appropriate medical treatment as acknowledged by          · Appropriate medical records of the examination and treatment of the patient are provided at the time of transfer   [address] [facility], [address] _______  · Transfer will be performed by qualified personnel from    and appropriate transfer equipment as required, including the use of necessary and appropriate life support measures      Provider Certification: I have examined the patient and explained the following risks and benefits of being transferred/refusing transfer to the patient/family:         Based on these reasonable risks and benefits to the patient and/or the unborn child(shelia), and based upon the information available at the time of the patients examination, I certify that the medical benefits reasonably to be expected from the provision of appropriate medical treatments at another medical facility outweigh the increasing risks, if any, to the individuals medical condition, and in the case of labor to the unborn child, from effecting the transfer      X____________________________________________ DATE 01/12/21        TIME_______      ORIGINAL - SEND TO MEDICAL RECORDS   COPY - SEND WITH PATIENT DURING TRANSFER

## 2021-01-13 VITALS
TEMPERATURE: 97.3 F | HEART RATE: 78 BPM | RESPIRATION RATE: 16 BRPM | SYSTOLIC BLOOD PRESSURE: 124 MMHG | OXYGEN SATURATION: 98 % | DIASTOLIC BLOOD PRESSURE: 80 MMHG

## 2021-01-13 PROCEDURE — 99225 PR SBSQ OBSERVATION CARE/DAY 25 MINUTES: CPT | Performed by: OBSTETRICS & GYNECOLOGY

## 2021-01-13 PROCEDURE — 99243 OFF/OP CNSLTJ NEW/EST LOW 30: CPT | Performed by: INTERNAL MEDICINE

## 2021-01-13 RX ORDER — ONDANSETRON 2 MG/ML
4 INJECTION INTRAMUSCULAR; INTRAVENOUS EVERY 4 HOURS PRN
Status: DISCONTINUED | OUTPATIENT
Start: 2021-01-13 | End: 2021-01-13 | Stop reason: HOSPADM

## 2021-01-13 RX ORDER — PNV NO.95/FERROUS FUM/FOLIC AC 28MG-0.8MG
1 TABLET ORAL DAILY
COMMUNITY
End: 2022-07-28 | Stop reason: ALTCHOICE

## 2021-01-13 RX ORDER — ACETAMINOPHEN 325 MG/1
975 TABLET ORAL EVERY 6 HOURS PRN
Refills: 0
Start: 2021-01-13 | End: 2021-09-14 | Stop reason: ALTCHOICE

## 2021-01-13 RX ADMIN — SODIUM CHLORIDE, SODIUM LACTATE, POTASSIUM CHLORIDE, AND CALCIUM CHLORIDE 125 ML/HR: .6; .31; .03; .02 INJECTION, SOLUTION INTRAVENOUS at 07:41

## 2021-01-13 RX ADMIN — ACETAMINOPHEN 975 MG: 325 TABLET ORAL at 10:53

## 2021-01-13 RX ADMIN — ONDANSETRON 4 MG: 2 INJECTION INTRAMUSCULAR; INTRAVENOUS at 10:54

## 2021-01-13 RX ADMIN — ONDANSETRON 4 MG: 2 INJECTION INTRAMUSCULAR; INTRAVENOUS at 04:57

## 2021-01-13 RX ADMIN — ENOXAPARIN SODIUM 90 MG: 100 INJECTION SUBCUTANEOUS at 05:08

## 2021-01-13 RX ADMIN — ACETAMINOPHEN 975 MG: 325 TABLET ORAL at 04:57

## 2021-01-13 NOTE — PLAN OF CARE
Problem: PAIN - ADULT  Goal: Verbalizes/displays adequate comfort level or baseline comfort level  Description: Interventions:  - Encourage patient to monitor pain and request assistance  - Assess pain using appropriate pain scale  - Administer analgesics based on type and severity of pain and evaluate response  - Implement non-pharmacological measures as appropriate and evaluate response  - Consider cultural and social influences on pain and pain management  - Notify physician/advanced practitioner if interventions unsuccessful or patient reports new pain  Outcome: Progressing     Problem: INFECTION - ADULT  Goal: Absence or prevention of progression during hospitalization  Description: INTERVENTIONS:  - Assess and monitor for signs and symptoms of infection  - Monitor lab/diagnostic results  - Monitor all insertion sites, i e  indwelling lines, tubes, and drains  - Monitor endotracheal if appropriate and nasal secretions for changes in amount and color  - [place] appropriate cooling/warming therapies per order  - Administer medications as ordered  - Instruct and encourage patient and family to use good hand hygiene technique  - Identify and instruct in appropriate isolation precautions for identified infection/condition  Outcome: Progressing  Goal: Absence of fever/infection during neutropenic period  Description: INTERVENTIONS:  - Monitor WBC    Outcome: Progressing     Problem: SAFETY ADULT  Goal: Patient will remain free of falls  Description: INTERVENTIONS:  - Assess patient frequently for physical needs  -  Identify cognitive and physical deficits and behaviors that affect risk of falls    -  [place] fall precautions as indicated by assessment   - Educate patient/family on patient safety including physical limitations  - Instruct patient to call for assistance with activity based on assessment  - Modify environment to reduce risk of injury  - Consider OT/PT consult to assist with strengthening/mobility  Outcome: Progressing  Goal: Maintain or return to baseline ADL function  Description: INTERVENTIONS:  -  Assess patient's ability to carry out ADLs; assess patient's baseline for ADL function and identify physical deficits which impact ability to perform ADLs (bathing, care of mouth/teeth, toileting, grooming, dressing, etc )  - Assess/evaluate cause of self-care deficits   - Assess range of motion  - Assess patient's mobility; develop plan if impaired  - Assess patient's need for assistive devices and provide as appropriate  - Encourage maximum independence but intervene and supervise when necessary  - Involve family in performance of ADLs  - Assess for home care needs following discharge   - Consider OT consult to assist with ADL evaluation and planning for discharge  - Provide patient education as appropriate  Outcome: Progressing  Goal: Maintain or return mobility status to optimal level  Description: INTERVENTIONS:  - Assess patient's baseline mobility status (ambulation, transfers, stairs, etc )    - Identify cognitive and physical deficits and behaviors that affect mobility  - Identify mobility aids required to assist with transfers and/or ambulation (gait belt, sit-to-stand, lift, walker, cane, etc )  - [place] fall precautions as indicated by assessment  - Record patient progress and toleration of activity level on Mobility SBAR; progress patient to next Phase/Stage  - Instruct patient to call for assistance with activity based on assessment  - Consider rehabilitation consult to assist with strengthening/weightbearing, etc   Outcome: Progressing     Problem: DISCHARGE PLANNING  Goal: Discharge to home or other facility with appropriate resources  Description: INTERVENTIONS:  - Identify barriers to discharge w/patient and caregiver  - Arrange for needed discharge resources and transportation as appropriate  - Identify discharge learning needs (meds, wound care, etc )  - Arrange for interpretive services to assist at discharge as needed  - Refer to Case Management Department for coordinating discharge planning if the patient needs post-hospital services based on physician/advanced practitioner order or complex needs related to functional status, cognitive ability, or social support system  Outcome: Progressing     Problem: Knowledge Deficit  Goal: Patient/family/caregiver demonstrates understanding of disease process, treatment plan, medications, and discharge instructions  Description: Complete learning assessment and assess knowledge base    Interventions:  - Provide teaching at level of understanding  - Provide teaching via preferred learning methods  Outcome: Progressing

## 2021-01-13 NOTE — ASSESSMENT & PLAN NOTE
· Acute, segmental, without hemodynamic instability or hypoxia  · First episode  · Provoked by current pregnancy (This is her 5th)  · Appropriately started on lovenox 1 milligram/kilogram q 12 by OB GYN  · Agree with Lovenox  Per my discussion with Dr Zeny Zapata, she will be discharged with this  · She can follow up with OB and MFM continue outpatient management of this    She has follow-ups scheduled 01/15/2021 with MFM and 01/27/2021 with OBGYN  · I also advised her to call her PCP Dr Mariya Dallas after discharge

## 2021-01-13 NOTE — UTILIZATION REVIEW
Initial Clinical Review    Transfer from [facility] ED    Admission: Date/Time/Statement:   Admission Orders (From admission, onward)     Ordered        213  Place in Observation  Once                   Orders Placed This Encounter   Procedures    Place in Observation     Standing Status:   Standing     Number of Occurrences:   1     Order Specific Question:   Level of Care     Answer:   Med Surg [16]     Assessment/Plan:    31 Y O female   Initially  Presented to ED at  with left calf pain for about  4 days, back pain radiating to chest and shoulder which persisted for  Few days  Work up revealed a  PE  Patient is  , now 6 w/6 days gestation  Transferred to [facility] for further care  Admit  Observation  With  Pulmonary Embolism and plan is Lovenox, FHT, PNV and IVF      Continue  Lovenox, PNV  Ambulating well  Additional Vital Signs:   21 0846  --  --  --  124/80  --  --  Lying   21 0700  97 3 °F (36 3 °C)Abnormal   78  16  89/55Abnormal    98 %  None (Room air)  Lying   BP: RN notified  Will recheck manually  at 21 0700   21 2331  97 °F (36 1 °C)Abnormal   76  18  150/86  97 %  --  Sitting   21 2047  97 °F (36 1 °C)Abnormal   85  18  135/74  99 %  --  Lying           Pertinent Labs/Diagnostic Test Results:   CTA  Chest  ( )      Suboptimal timing of the contrast bolus for assessment of pulmonary embolism   Within the limitations of the examination, the following findings are noted       Occlusive segmental branch pulmonary embolism in the right anterior lower lobe   Focal consolidation at the anterior right lower lobe lung base may represent a focal pulmonary infarction and/or atelectasis        Results from last 7 days   Lab Units 21  1106   SARS-COV-2  Negative     Results from last 7 days   Lab Units 21  1117   WBC Thousand/uL 10 80   HEMOGLOBIN g/dL 12 9   HEMATOCRIT % 37 3*   PLATELETS Thousands/uL 266   NEUTROS ABS Thousands/µL 8 30*         Results from last 7 days   Lab Units 01/12/21  1117   SODIUM mmol/L 135   POTASSIUM mmol/L 3 8   CHLORIDE mmol/L 102   CO2 mmol/L 24   ANION GAP mmol/L 9   BUN mg/dL 7   CREATININE mg/dL 0 66   EGFR ml/min/1 73sq m 121   CALCIUM mg/dL 8 9             Results from last 7 days   Lab Units 01/12/21  1117   GLUCOSE RANDOM mg/dL 93           Results from last 7 days   Lab Units 01/12/21  1117   TROPONIN I ng/mL <0 03     Results from last 7 days   Lab Units 01/12/21  1117   D-DIMER QUANTITATIVE ug/ml FEU 0 87*     Results from last 7 days   Lab Units 01/12/21  1117   PROTIME seconds 13 1   INR  1 00           Results from last 7 days   Lab Units 01/12/21  1106   INFLUENZA A PCR  Negative   INFLUENZA B PCR  Negative   RSV PCR  Negative           Admitting Diagnosis: Pulmonary embolism ([facility] [address] ) [I26 [id number]]  Age/Sex: 31 y o  female  Admission Orders:  Scheduled Medications:  enoxaparin, 1 mg/kg, Subcutaneous, Q12H [facility]      Continuous IV Infusions:  lactated ringers, 125 mL/hr, Intravenous, Continuous      PRN Meds:  acetaminophen, 975 mg, Oral, Q6H PRN  ondansetron, 4 mg, Intravenous, Q6H PRN        IP CONSULT TO CASE MANAGEMENT  IP CONSULT TO INTERNAL MEDICINE    Network Utilization Review Department  ATTENTION: Please call with any questions or concerns to [phone] and carefully listen to the prompts so that you are directed to the right person  All voicemails are confidential   Nohemy Hunt all requests for admission clinical reviews, approved or denied determinations and any other requests to dedicated fax number below belonging to the campus where the patient is receiving treatment   List of dedicated fax numbers for the Facilities:  [address] [address] [facility] DENIALS (Administrative/Medical Necessity) [phone]   [address] (Maternity/NICU/Pediatrics) [phone]   [address] [address] [facility] [address] [address] [place] [address] [address] [phone]   [place] [id number] [phone]

## 2021-01-13 NOTE — PROGRESS NOTES
Gynecology Progress note   Marcelo Mazariegos 31 y o  female MRN: [id number]  Unit/Bed#: E5 -01 Encounter: [id number]    A/P: Marcelo Mazariegos is a 31 y o  female at 12w0d admitted with chest pain secondary to pulmonary embolism    1  Pulmonary embolism   -currently on Lovenox 1mg/kg q12 hours   -CM consult  2  IUP at 28w   -continue prenatal vitamins  3  FEN   -regular diet, LR @ 125cc/hr  4  Dispo   -discharge today after CM consult    Subjective:    No acute events overnight  Pain is well controlled  Pt is tolerating PO  Pt is ambulating  Denies fevers/chills  Review of Systems   Constitutional: Negative for chills and fever  Eyes: Negative for visual disturbance  Respiratory: Negative for shortness of breath  Cardiovascular: Negative for chest pain  Gastrointestinal: Negative for constipation, diarrhea, nausea and vomiting  Genitourinary: Negative for pelvic pain, urgency, vaginal bleeding, vaginal discharge and vaginal pain  Neurological: Negative for headaches  Objective:    /86 (BP Location: Right arm)   Pulse 76   Temp (!) 97 °F (36 1 °C) (Temporal)   Resp 18   LMP 10/20/2020 (Approximate) Comment: 12 weeks  SpO2 97%     No intake/output data recorded  No intake/output data recorded  Lab Results   Component Value Date    WBC 10 80 01/12/2021    HGB 12 9 01/12/2021    HCT 37 3 (L) 01/12/2021    MCV 91 01/12/2021     01/12/2021       Lab Results   Component Value Date    CALCIUM 8 9 01/12/2021    K 3 8 01/12/2021    CO2 24 01/12/2021     01/12/2021    BUN 7 01/12/2021    CREATININE 0 66 01/12/2021       No results found for: POCGLU    Physical Exam  Vitals signs and nursing note reviewed  Constitutional:       Appearance: She is well-developed  Cardiovascular:      Rate and Rhythm: Normal rate and regular rhythm  Pulmonary:      Effort: Pulmonary effort is normal       Breath sounds: Normal breath sounds        Comments: cough  Abdominal:      General: Bowel sounds are normal       Tenderness: There is no abdominal tenderness  There is no guarding or rebound  Musculoskeletal: Normal range of motion  General: No tenderness  Neurological:      Mental Status: She is alert and oriented to person, place, and time         Jose Alejandro Wick MD   PGY-4 OB/GYN  1/13/2021 6:44 AM

## 2021-01-13 NOTE — DISCHARGE INSTRUCTIONS
Vaccine Decision Making  Im pregnant  Should I get a COVID vaccine? The information here is about the [place] and Moderna COVID-19 vaccines  These are also called mRNA vaccines  Reference numbers written like this (#) correspond to the footnotes at the end of this section  For most people, getting the COVID vaccine as soon as possible is the safest choice  However, these vaccines have not been tested in pregnant and breastfeeding women yet  The information below will help you make an informed choice about whether to get an mRNA COVID vaccine while you are pregnant or trying to get pregnant  Your options:   Get a COVID vaccine as soon as it is available    Wait for more information about the vaccines in pregnancy    What are the benefits of getting an mRNA COVID Vaccine? 1  COVID is dangerous  It is more dangerous for pregnant women   COVID patients who are pregnant are 5 times more likely to end up in the intensive care unit (ICU) or on a ventilator than COVID patients who are not pregnant  (#1)    birth may be more common for pregnant women with severe COVID  (#2)   Pregnant women are more likely to die of COVID than non-pregnant women with COVID who are the same age  (#3,4)    2  The mRNA COVID vaccines prevent about 95% of COVID infections   As COVID infections go up in our communities, your risk of getting COVID goes up too   Getting a vaccine will prevent you from getting COVID and may help keep you from giving COVID to people around you, like your family  3  The mRNA COVID vaccines cannot give you COVID   These vaccines have no live virus  (#5)   These vaccines do NOT contain ingredients that are known to be harmful to pregnant women or to the fetus   Many vaccines are routinely given in pregnancy and are safe (for example: tetanus, diphtheria, and flu)    More details about how these vaccines work can be found below in the section "More information about mRNA COVID vaccines"  What are the risks of getting an mRNA COVID vaccine? 1  These COVID vaccines have not yet been tested in pregnant people   These vaccines were tested in over 40,000 people, and there were no serious side effects related to the vaccine   We do not know if the vaccines work as well in pregnant people as they did in non-pregnant people   We do not know whether there are unique downsides in pregnancy, like different side effects or an increased risk of miscarriage or fetal abnormalities   The Moderna vaccine was tested in female rats to look at its effects on pregnancy  No significant negative effects were found on female fertility or fetal development   Some women became pregnant during the vaccine studies  Eighteen of these women were in the vaccine group, and two months later none had miscarried  There were [de-identified] women in the placebo group who became pregnant, and two months later two of them had had miscarriages  (In general, 10-20% of pregnancies end in miscarriage)   Because these studies are still ongoing, we dont know how the rest of the pregnancy went for these women  2  People getting the vaccine will probably have some side effects   Many people had symptoms caused by their immune systems normal response to the vaccine  The most common side effects were:(#6)   injection site reactions like sore arm (~84%)   fatigue (~62%)   headache (~55%)   muscle pain (~38%)   chills (~32%)   joint pain (~24%)   fever (~14%)   Of 100 people who get the vaccine, 1 will get a high fever (over 102°F)  A persistent high fever during the first trimester might increase the risk of fetal abnormalities or miscarriage  The CDC recommends using Tylenol (acetaminophen) during pregnancy if you have a high fever  Another option is to delay your COVID vaccine until after the first trimester  What do the experts recommend?   Because COVID is dangerous and easily spread, the CDC says that the mRNA vaccines for COVID-19 are recommended for adults  (#7)  However, because there are no studies of pregnant women yet, there are no clear recommendations for pregnant women  This is standard for a new drug and is not due to any particular concern with this vaccine  The Society for Maternal-Fetal Medicine strongly recommends that pregnant individuals have access to COVID vaccines  They recommend that each person talk to their doctor or midwife about their own personal choice  (#8)    The [address] [facility] and Gynecologists recommends that the COVID vaccine should not be withheld from pregnant individuals  (#9)    What else should I think about to help me decide? Make sure you understand as much as you can about COVID and about the vaccine  Ask a trusted source, like your midwife or doctor  Continue reading below for more information about the vaccine  Think about your own personal risk  Look at the columns below and think about your risk of getting COVID (left)  Think about your safety - are you able to stay safe (right)? The risks of getting sick from [facility]  are higher if If you are not at higher risk for COVID  and   ?? You have contact with people  outside your home ? ? You are always able to wear a mask   ? ? You are 28years old or older ? ? You and the people you live with  can socially distance from others for  your whole pregnancy   ? ? You are overweight ? ? Your community does NOT have  high or increasing COVID cases   ? ? You have other medical problems  like diabetes, high blood pressure,  or heart disease ? ? You think the vaccine itself will make  you very nervous (you are more  worried about the unknown risks  than about getting COVID)   ? ? You are a smoker ? ? You have had a severe allergic  reaction to a vaccine   ? ? You are a racial or ethnic minority, or your community has a high rate of COVID infections    ? ?  You are a healthcare worker(#10)    If you are at a higher risk of getting  COVID, it probably makes sense to get  the vaccine   it might make sense for you to wait  for more information  What about breastfeeding? The Society for [address] [place] [address] Street of Breastfeeding Medicine report that there is no reason to believe that the vaccine affects the safety of breastmilk  8,11 The vaccine does not contain the virus, so there is no risk of infecting your baby  Because mRNA is fragile, it is very unlikely that any part of the vaccine gets into breastmilk  When we have an infection or get a vaccine, our bodies make antibodies to fight the infection  Antibodies can pass into the breastmilk and then to the baby - and may help prevent infections  Summary  1  COVID seems to cause more harm in pregnant women than in women of the same age who are not pregnant  2  The risks of getting an mRNA COVID vaccine during pregnancy are thought to be small but are not totally known  3  You should consider your own personal risk of getting COVID  If your personal risk is high, or there are many cases of COVID in your community, it probably makes sense for you to get a vaccine while pregnant  4  Whether to get a COVID vaccine during pregnancy is your choice  What do pregnant doctors think? 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, so for me the choice is clear, I intend to be first in line as soon as they will let me have one " (Pregnant Emergency Department Doctor)    "I am a little nervous about getting something that hasnt been tested in pregnant patients  Early pregnancy is a nerve-wracking time, even without the unknown of a new vaccine  So, I went over the risks and benefits of getting or not getting it as a front- - with myself, my partner, and my doctors   We ended up deciding I should get the vaccine " (Pregnant Emergency Department Doctor)    "Im 34 weeks and I'm going to try to get vaccinated after delivery, but during pregnancy I'm holding out  Pregnant women were excluded from the studies and, in the meantime, I don't see [place] patients at work so I feel like my exposure will be low during this second wave " (Pregnant physician)    "I am still breastfeeding my baby, and I think the risk of exposing my infant and other children and partner to [place] is far greater than any theoretical risk this novel vaccine may have  Ive decided to get vaccinated whenever it becomes available " (Breastfeeding OB/GYN Doctor)      Do you have more questions? Call your doctor or midwife to talk about your own personal decision  Thoughts about this tool? Was this decision aid helpful? Please take a moment to give us feedback about this decision aid at [email or web address] or by scanning the QR code below  We need your help! Tell the [facility] about your experience with the vaccine  If you decide to get the vaccine, you will get a V-safe information sheet with instructions about the V-safe website and vani  Consider registering so we can better  women in the future  More information about mRNA COVID Vaccines  How do mRNA COVID vaccines work?  The Pfizer and Moderna COVID vaccines are mRNA vaccines (messenger RNA)   mRNA is not new - our bodies are full of it  mRNA vaccines have been studied for the past two decades   mRNA vaccines mimic how viruses work  The mRNA is like a recipe card that goes into your body and makes one recipe for a brief time  The recipe is for a small part of the virus (the spike protein)   When this spike protein is released from cells, the body recognizes it as foreign and the immune system responds  This immune response causes the side effect symptoms (like aches and fever) but leads to improved immunity   mRNA breaks down quickly, so it only lasts a brief time     This is also how the other viruses like a cold virus work - viruses use our body and cells to make their proteins  Then our immune system attacks those proteins to keep us healthy   There is no way for the vaccine to give people COVID  (#5)    What did the research show? The Pfizer and Moderna mRNA vaccine trials each had over 30,000 people (including those who got placebo) and showed that the vaccine lowers a persons chance of getting COVID and severe COVID  In each study, over 15,000 people got the vaccine and over 15,000 people got a saline injection (placebo)   After one dose, the vaccine appears to be 50% effective  After 2 doses, both vaccines are about 95% effective   In other words, for every 100 people who got COVID in the placebo group, only 5 people got COVID in the mRNA vaccine groups   Severe cases of COVID were also reduced in both mRNA vaccine groups   There were no serious safety concerns  Intended Use   This decision aid is intended for use by pregnant women (and women planning on becoming pregnant) who are considering getting the COVID-19 vaccine, as well as their  healthcare providers, and their friends and family  It was created by the Shared Decision-Making: COVID Vaccination in Pregnancy working group at the [facility]  This group consists of experts in the fields of OB/GYN, Maternal-Fetal Medicine, Shared Decision-Making and risk communication, Emergency Medicine, and current COVID-19 research  Questions should be directed to Dr [email or web address]  Feedback regarding the utility of this decision aid can be directed through the survey (see link above)  This decision aid can be reproduced and distributed without additional permission  [place] and [place] versions available at [email or web address] [email or web address]  Updated December 22, 2020  1   Humphrey CARLOS, et al  Pregnant women with severe or critical COVID-19 have increased composite morbidity compared to  non-pregnant matched controls  Am J Obstet 2020 doi: 10 1016/j ajog  11 022  2  Tatianna AYON, et al  Pregnancy outcomes among women with and without severe acute respiratory syndrome coronavirus  2 infection  [facility]  Nov 3(11):r9900180  3  Aster GIFFORD [facility] working group on COVID-19  Maternal and  Outcomes of Pregnancy Women with SARScoV-  2 infection  Ultrasound Obstet Gynecol  2020  doi: 10 1002/uog  91268  4  Centers for Disease Control and Prevention  Update: Characteristics of Symptomatic Women of Reproductive Age with  Laboratory-Confirmed SARS-CoV-2 Infection by Pregnancy Status -- [place], -October 3, 2020  2020:1-7   5  Andreas DOAN  COVID-19 and mRNA Vaccines--First Large Test for a New Approach  MALICK  2020;324(12):8264-9776  doi:10 1001/malick  6600 [id number]  6  [facility]  7  [employer] br ([address], )  8  SM statement on COVID vaccination in pregnancy: https://www  [facility] org/publications/339-society-for-maternal-fetalmedicine-  smfm-statement-sars-cov-2-vaccination-in-pregnancy  9  [employer] com au-  Lactating-Patients-Against-COVID-19 (Accessed 2020)  10  Alonso Ramirez et al  Occupation and risk of severe COVID-19: prospective cohort study of  [facility] participants  Occupational and Environmental Medicine Published Online First: 2020  doi: 10 [id number]/qsumf-[id number]  11  [email or web address]-[email or web address]-19-vaccination-in-lactation          Pulmonary Embolism   WHAT YOU NEED TO KNOW:   A pulmonary embolism (PE) is the sudden blockage of a blood vessel in the lungs by an embolus  A PE can become life-threatening   Go to follow-up appointments and take blood thinners as directed  These are especially important if you were discharged home from the emergency department  DISCHARGE INSTRUCTIONS:   Call your local emergency number (911 in the [address]) if:   · You feel lightheaded, short of breath, and have chest pain  · You cough up blood  Call your doctor if:   · Your arm or leg feels warm, tender, and painful  It may look swollen and red  · You have questions or concerns about your condition or care  Medicines:   · Blood thinners  help prevent blood clots  Clots can cause strokes, heart attacks, and death  The following are general safety guidelines to follow while you are taking a blood thinner:    ? Watch for bleeding and bruising while you take blood thinners  Watch for bleeding from your gums or nose  Watch for blood in your urine and bowel movements  Use a soft washcloth on your skin, and a soft toothbrush to brush your teeth  This can keep your skin and gums from bleeding  If you shave, use an electric shaver  Do not play contact sports  ? Tell your dentist and other healthcare providers that you take a blood thinner  Wear a bracelet or necklace that says you take this medicine  ? Do not start or stop any other medicines unless your healthcare provider tells you to  Many medicines cannot be used with blood thinners  ? Take your blood thinner exactly as prescribed by your healthcare provider  Do not skip does or take less than prescribed  Tell your provider right away if you forget to take your blood thinner, or if you take too much  ? Warfarin  is a blood thinner that you may need to take  The following are things you should be aware of if you take warfarin:     § Foods and medicines can affect the amount of warfarin in your blood  Do not make major changes to your diet while you take warfarin  Warfarin works best when you eat about the same amount of vitamin K every day   Vitamin K is found in green leafy vegetables and certain other foods  Ask for more information about what to eat when you are taking warfarin  § You will need to see your healthcare provider for follow-up visits when you are on warfarin  You will need regular blood tests  These tests are used to decide how much medicine you need  · Take your medicine as directed  Contact your healthcare provider if you think your medicine is not helping or if you have side effects  Tell him or her if you are allergic to any medicine  Keep a list of the medicines, vitamins, and herbs you take  Include the amounts, and when and why you take them  Bring the list or the pill bottles to follow-up visits  Carry your medicine list with you in case of an emergency  Prevent another PE:   · Change your body position or move around often  Move and stretch in your seat several times each hour if you travel by car or work at a desk  In an airplane, get up and walk every hour  · Exercise regularly to help increase your blood flow  Walking is a good low-impact exercise  Talk to your healthcare provider about the best exercise plan for you  · Maintain a healthy weight  Ask your healthcare provider how much you should weigh  Ask him or her to help you create a weight loss plan if you are overweight  · Do not smoke  Nicotine and other chemicals in cigarettes and cigars can damage blood vessels and increase your risk for another PE  Ask your healthcare provider for information if you currently smoke and need help to quit  E-cigarettes or smokeless tobacco still contain nicotine  Talk to your healthcare provider before you use these products  · Ask about birth control if you are a woman who takes the pill  A birth control pill increases the risk for blood clots in certain women  The risk is higher if you are also older than 35, smoke cigarettes, or have a blood clotting disorder   Talk to your healthcare provider about other ways to prevent pregnancy, such as a cervical cap or intrauterine device (IUD)  Follow up with your doctor or specialist as directed: You may need to come in regularly for scans to check for blood clots  Your blood may checked to see how long it takes to clot  Your doctor or specialist will tell you if you need to have this test and how often to have it  Write down your questions so you remember to ask them during your visits  © Copyright [address] Information is for End User's use only and may not be sold, redistributed or otherwise used for commercial purposes  All illustrations and images included in CareNotes® are the copyrighted property of A D A M , Inc  or [facility] Erick Landin   The above information is an  only  It is not intended as medical advice for individual conditions or treatments  Talk to your doctor, nurse or pharmacist before following any medical regimen to see if it is safe and effective for you  Pregnancy at 11 to 14 Weeks   AMBULATORY CARE:   Changes happening to your body: You are now at the end of your first trimester and entering your second trimester  Morning sickness usually goes away by this time  You may have other symptoms such as fatigue, frequent urination, and headaches  You may have gained 2 to 4 pounds by now  Seek care immediately if:   · You have pain or cramping in your abdomen or low back  · You have heavy vaginal bleeding or clotting  · You pass material that looks like tissue or large clots  Collect the material and bring it with you  Call your doctor or obstetrician if:   · You cannot keep food or drinks down, and you are losing weight  · You have light vaginal bleeding  · You have chills or a fever  · You have vaginal itching, burning, or pain  · You have yellow, green, white, or foul-smelling vaginal discharge  · You have pain or burning when you urinate, less urine than usual, or pink or bloody urine      · You have questions or concerns about your condition or care  How to care for yourself at this stage of your pregnancy:   · Get plenty of rest   You may feel more tired than normal  You may need to take naps or go to bed earlier  · Manage nausea and vomiting  Avoid fatty and spicy foods  Eat small meals throughout the day instead of large meals  Bebe may help to decrease nausea  Ask your healthcare provider about other ways of decreasing nausea and vomiting  · Eat a variety of healthy foods  Healthy foods include fruits, vegetables, whole-grain breads, low-fat dairy foods, beans, lean meats, and fish  Drink liquids as directed  Ask how much liquid to drink each day and which liquids are best for you  Limit caffeine to less than 200 milligrams each day  Limit your intake of fish to 2 servings each week  Choose fish low in mercury such as canned light tuna, shrimp, salmon, cod, or tilapia  Do not  eat fish high in mercury such as swordfish, tilefish, miguel angel mackerel, and shark  · Take prenatal vitamins as directed  Your need for certain vitamins and minerals, such as folic acid, increases during pregnancy  Prenatal vitamins provide some of the extra vitamins and minerals you need  Prenatal vitamins may also help to decrease the risk of certain birth defects  · Do not smoke  Smoking increases your risk of a miscarriage and other health problems during your pregnancy  Smoking can cause your baby to be born too early or weigh less at birth  Ask your healthcare provider for information if you need help quitting  · Do not drink alcohol  Alcohol passes from your body to your baby through the placenta  It can affect your baby's brain development and cause fetal alcohol syndrome (FAS)  FAS is a group of conditions that causes mental, behavior, and growth problems  · Talk to your healthcare provider before you take any medicines  Many medicines may harm your baby if you take them when you are pregnant   Do not take any medicines, vitamins, herbs, or supplements without first talking to your healthcare provider  Never use illegal or street drugs (such as marijuana or cocaine) while you are pregnant  Safety tips during pregnancy:   · Avoid hot tubs and saunas  Do not use a hot tub or sauna while you are pregnant, especially during your first trimester  Hot tubs and saunas may raise your baby's temperature and increase the risk of birth defects  · Avoid toxoplasmosis  This is an infection caused by eating raw meat or being around infected cat feces  It can cause birth defects, miscarriages, and other problems  Wash your hands after you touch raw meat  Make sure any meat is well-cooked before you eat it  Avoid raw eggs and unpasteurized milk  Use gloves or ask someone else to clean your cat's litter box while you are pregnant  Changes happening with your baby: Your baby has fully formed fingernails and toenails  Your baby's heartbeat can now be heard  Ask your healthcare provider if you can listen to your baby's heartbeat  By week 14, your baby is over 4 inches long from the top of the head to the rump (baby's bottom)  Your baby weighs over 3 ounces  Prenatal care:  Prenatal care is a series of visits with your healthcare provider throughout your pregnancy  During the first 28 weeks of your pregnancy, you will see your healthcare provider 1 time each month  Prenatal care can help prevent problems during pregnancy and childbirth  Your healthcare provider will check your blood pressure and weight  Your baby's heart rate will also be checked  You may also need the following at some visits:  · A pelvic exam  allows your healthcare provider to see your cervix (the bottom part of your uterus)  Your healthcare provider will use a speculum to open your vagina  He or she will check the size and shape of your uterus      · Blood tests  may be done to check for any of the following:     ? Gestational diabetes or anemia (low iron level)    ? Blood type or Rh factor, or certain birth defects    ? Immunity to certain diseases, such as chickenpox or rubella    ? An infection, such as a sexually transmitted infection, HIV, or hepatitis B    · Hepatitis B  may need to be prevented or treated  Hepatitis B is inflammation of the liver caused by the hepatitis B virus (HBV)  HBV can spread from a mother to her baby during delivery  You will be checked for HBV as early as possible in the first trimester of each pregnancy  You need the test even if you received the hepatitis B vaccine or were tested before  You may need to have an HBV infection treated before you give birth  · Urine tests  may also be done to check for sugar and protein  These can be signs of gestational diabetes or preeclampsia  Urine tests may also be done to check for signs of infection  · A fetal ultrasound  shows pictures of your baby inside your uterus  The pictures are used to check your baby's development, movement, and position  · Genetic disorder screening tests  may be offered to you  These tests check your baby's risk for genetic disorders such as Down syndrome  A screening test includes a blood test and ultrasound  Follow up with your doctor or obstetrician as directed:  Go to all prenatal visits  Write down your questions so you remember to ask them during your visits  © Copyright [address] [place] [address] Drive Information is for End User's use only and may not be sold, redistributed or otherwise used for commercial purposes  All illustrations and images included in CareNotes® are the copyrighted property of [employer]  or [facility]   The above information is an  only  It is not intended as medical advice for individual conditions or treatments  Talk to your doctor, nurse or pharmacist before following any medical regimen to see if it is safe and effective for you            Enoxaparin (By injection)   Enoxaparin (ee-nox-a-PAR-in)  Prevents and treats blood clots  Also treats heart attacks  This medicine is a blood thinner  Brand Name(s): [employer] Enoxaparin Sodium, Lovenox, [employer] Rx Lovenox   There may be other brand names for this medicine  When This Medicine Should Not Be Used: You should not use this medicine if you have had an allergic reaction to enoxaparin, heparin, benzyl alcohol, or products made from pork  You should not use enoxaparin if you have bleeding disorders or any active bleeding  How to Use This Medicine:   Injectable  · Your doctor will prescribe your exact dose and tell you how often it should be given  This medicine is given as a shot under your skin  · A nurse or other health provider will give you this medicine  It may also be given by a home health caregiver  · You may be taught how to give your medicine at home  Make sure you understand all instructions before giving yourself an injection  Do not use more medicine or use it more often than your doctor tells you to  · You will be shown the body areas where this shot can be given  Use a different body area each time you give yourself a shot  Keep track of where you give each shot to make sure you rotate body areas  · Use a new needle and syringe each time you inject your medicine  If a dose is missed:   · You must use this medicine on a fixed schedule  Call your doctor or pharmacist if you miss a dose  How to Store and Dispose of This Medicine:   · If you store this medicine at home, keep it at room temperature, away from heat and direct light  · If you were given a bottle of medicine to use with your syringes, you must use the medicine within 28 days after the first shot  Throw away the unused medicine in the bottle after 28 days  · Throw away used needles in a hard, closed container that the needles cannot poke through  Keep this container away from children and pets    · Ask your pharmacist, doctor, or health caregiver about the best way to dispose of any leftover medicine, containers, and other supplies  Throw away old medicine after the expiration date has passed  · Keep all medicine out of the reach of children  Never share your medicine with anyone  Drugs and Foods to Avoid:   Ask your doctor or pharmacist before using any other medicine, including over-the-counter medicines, vitamins, and herbal products  · Make sure your doctor knows if you are also using blood thinners (such as clopidogrel, warfarin, or Coumadin®)  Tell your doctor if you are also using dipyridamole (Persantine®), ketorolac (Toradol®), or sulfinpyrazone (Anturane®)  · Make sure your doctor knows if you are using pain or arthritis medicine (such as aspirin, Advil®, Aleve®, Motrin®, Orudis®, Dolobid®, West acio, Indocin®, Relafen®, or Voltaren®)  Avoid taking aspirin or medicines that contain aspirin, unless your doctor tells you to  Warnings While Using This Medicine:   · Make sure your doctor knows if you are pregnant or breastfeeding, or if you have liver disease, kidney disease, blood vessel problems, diabetes, a heart infection, uncontrolled high blood pressure, a stomach ulcer or bleeding, or a bleeding disorder such as hemophilia  Tell your doctor if you have a bleeding disorder caused by heparin  · Make sure your doctor knows if you have recently had a stroke, or surgery on your eyes, brain, or spine  Tell your doctor if you have had a heart valve replaced  · This medicine may cause bleeding or bruising  This risk is higher if you have a catheter in your back for pain medicine or anesthesia (sometimes called an "epidural"), or if you have kidney problems  The risk of bleeding increases if your kidney problems get worse  Discuss this with your doctor if you are concerned  · You may bleed and bruise more easily while you are using this medicine  Be extra careful to avoid injuries until the effects of the medicine have worn off   Stay away from rough sports or other situations where you could be bruised, cut, or injured  Brush and floss your teeth gently  Be careful when using sharp objects, including razors and fingernail clippers  Avoid picking your nose  If you need to blow your nose, blow it gently  · Watch for any bleeding from open areas such as around the injection site  Also check for blood in your urine or stool  If you have any bleeding or injuries, tell your doctor right away  · Tell any doctor or dentist who treats you that you are using this medicine  You may need to stop using this medicine several days before you have surgery or medical tests  · Your doctor will do lab tests at regular visits to check on the effects of this medicine  Keep all appointments  Possible Side Effects While Using This Medicine:   Call your doctor right away if you notice any of these side effects:  · Allergic reaction: Itching or hives, swelling in your face or hands, swelling or tingling in your mouth or throat, chest tightness, trouble breathing  · Blood in your urine  · Bloody or black, tarry stools  · Chest pain, shortness of breath, or coughing up blood  · Fever  · Large, flat, blue or purplish patches in the skin  · Numbness or weakness in your arm or leg, or on one side of your body  · Pain in your lower leg (calf)  · Sudden or severe headache, problems with vision, speech, or walking  · Swelling in your hands, ankles, or feet  · Uneven heartbeat  · Unusual bleeding or bruising  · Vomiting blood or material that looks like coffee grounds  · Warmth or redness in your face, neck, arms, or upper chest   If you notice these less serious side effects, talk with your doctor:   · Confusion  · Nausea or diarrhea  · Pain, redness, bruising, swelling, or a lump under your skin where the shot was given  If you notice other side effects that you think are caused by this medicine, tell your doctor  Call your doctor for medical advice about side effects   You may report side effects to FDA at 2-083-FDA-8266  © Copyright [address] Information is for End User's use only and may not be sold, redistributed or otherwise used for commercial purposes  The above information is an  only  It is not intended as medical advice for individual conditions or treatments  Talk to your doctor, nurse or pharmacist before following any medical regimen to see if it is safe and effective for you

## 2021-01-13 NOTE — H&P
H&P Exam - Gynecology   Cintia Irby 31 y o  female MRN: [id number]  Unit/Bed#: E5 -01 Encounter: [id number]    Chief complaint:  Chest pain      History of Present Illness   HPI:  Cintia Irby is a 31 y o  female [id number] 11w6d gestation who presents as a transfer from [place] after diagnosis of a pulmonary embolism  She reports that approximately 1-2 weeks ago she had experienced left calf soreness and pain that lasted approximately 3-4 days and resolved  She then states that starting on  she experienced back pain that then radiated to her chest and shoulder  This continued over the next few days  She took Tylenol at home with no relief  She reports that the pain is worse when on her right side and better when lying on her left or when sitting up  She is currently receiving her obstetric care at [facility]  She denies family history and personal history of DVT or PE  Her obstetrical history is notable for a prior  section in 2016 for arrest of dilation and category 2 fetal heart tracing  Since arrival from [facility] and she reports improvement in chest pain however she now is reporting a 8/10 headache  She also reports that she is nauseated which she thinks is due to the morphine she received in the ED  Review of Systems   Constitutional: Negative for chills and fever  Respiratory: Negative for cough, shortness of breath and wheezing  Cardiovascular: Positive for chest pain  Gastrointestinal: Positive for nausea and vomiting  Negative for abdominal pain  Genitourinary: Negative for dysuria, hematuria, urgency, vaginal bleeding and vaginal discharge  Musculoskeletal: Positive for back pain  Negative for joint swelling  Negative for Calf tenderness   Neurological: Negative for dizziness, weakness, light-headedness and headaches         Historical Information   Past Medical History:   Diagnosis Date    Irritable bowel syndrome     last assessed: 10/23/2015   Shellie Cruz Maternal obesity, antepartum     last assessed: 2017    Migraine      Past Surgical History:   Procedure Laterality Date     SECTION      FOOT SURGERY       OB/GYN History:   001  Family History   Problem Relation Age of Onset    Irritable bowel syndrome Mother     Ovarian cancer Family     Ovarian cancer Family      Social History   Social History     Substance and Sexual Activity   Alcohol Use No     Social History     Substance and Sexual Activity   Drug Use No     Social History     Tobacco Use   Smoking Status Never Smoker   Smokeless Tobacco Never Used       Meds/Allergies   Medications Prior to Admission   Medication    aluminum chloride (DRYSOL) 20 % external solution    cetirizine (ZyrTEC) 10 mg tablet    clonazePAM (KlonoPIN) 0 5 mg tablet    hydrocortisone (ANUSOL-HC) 2 5 % rectal cream    methylPREDNISolone 4 MG tablet therapy pack    PARoxetine (PAXIL) 20 mg tablet     Allergies   Allergen Reactions    Penicillin G Hives and Diarrhea     hives  N/V    Azithromycin Rash       Objective   /74 (BP Location: Left arm)   Pulse 85   Temp (!) 97 °F (36 1 °C) (Temporal)   Resp 18   LMP 10/20/2020   SpO2 99%     No intake or output data in the 24 hours ending 214    Invasive Devices: Invasive Devices     Peripheral Intravenous Line            Peripheral IV 21 Left Antecubital less than 1 day                Physical Exam  Vitals signs reviewed  Constitutional:       Appearance: Normal appearance  Cardiovascular:      Rate and Rhythm: Normal rate and regular rhythm  Heart sounds: No murmur  No friction rub  No gallop  Pulmonary:      Effort: Pulmonary effort is normal  No respiratory distress  Breath sounds: No wheezing  Abdominal:      General: Abdomen is flat  There is no distension  Palpations: Abdomen is soft  Tenderness: There is no abdominal tenderness  Musculoskeletal:         General: No swelling or tenderness  Skin:     General: Skin is warm and dry  Neurological:      Mental Status: She is alert and oriented to person, place, and time  Lab Results:   Admission on 2021, Discharged on 2021   Component Date Value    WBC 2021 10 80     RBC 2021 4 08     Hemoglobin 2021 12 9     Hematocrit 2021 37 3*    MCV 2021 91     MCH 2021 31 6     MCHC 2021 34 6     RDW 2021 11 8     MPV 2021 8 3*    Platelets 15/36/8515 266     Neutrophils Relative 2021 77*    Lymphocytes Relative 2021 17*    Monocytes Relative 2021 4     Eosinophils Relative 2021 2     Basophils Relative 2021 0     Neutrophils Absolute 2021 8 30*    Lymphocytes Absolute 2021 1 80     Monocytes Absolute 2021 0 50     Eosinophils Absolute 2021 0 20     Basophils Absolute 2021 0 00     Sodium 2021 135     Potassium 2021 3 8     Chloride 2021 102     CO2 2021 24     ANION GAP 2021 9     BUN 2021 7     Creatinine 2021 0 66     Glucose 2021 93     Calcium 2021 8 9     eGFR 2021 121     Troponin I 2021 <0 03     D-Dimer, Quant 2021 0 87*    SARS-CoV-2 2021 Negative     INFLUENZA A PCR 2021 Negative     INFLUENZA B PCR 2021 Negative     RSV PCR 2021 Negative     Protime 2021 13 1     INR 2021 1 00       Imaging: I have personally reviewed pertinent reports  EKG, Pathology, and Other Studies: I have personally reviewed pertinent reports        Assessment/Plan     A/P:  51-year-old  001 at 11 weeks 6 day gestation with pulmonary embolism  1) Pulmonary embolism   -Lovenox 1mg/ kg q12h   -Tylenol 975 mg q6h for pain; Zofran for nausea   -Case management consult for Lovenox for home   -Regular diet, IV fluids    2) 11 weeks gestation   -Continue prenatal vitamins   -Fetal heart tones before discharge    Code Status: Level 1 - Full Code  Advance Directive and Living Will:      Power of :    POLST:      Mikaela Collins MD  1/12/2021  10:34 PM

## 2021-01-13 NOTE — CONSULTS
Consult- Marcelo Mazariegos 1990, 31 y o  female MRN: [id number]    Unit/Bed#: E5 -01 Encounter: [id number]    Primary Care Provider: TAHIR Ham   Date and time admitted to hospital: 1/12/2021  8:47 PM      Consults    * Pulmonary embolism ([address] )  Assessment & Plan  · Acute, segmental, without hemodynamic instability or hypoxia  · First episode  · Provoked by current pregnancy (This is her 5th)  · Appropriately started on lovenox 1 milligram/kilogram q 12 by OB GYN  · Agree with Lovenox  Per my discussion with Dr Tiffany Garcia, she will be discharged with this  · She can follow up with OB and MFM continue outpatient management of this  She has follow-ups scheduled 01/15/2021 with MFM and 01/27/2021 with OBGYN  · I also advised her to call her PCP Dr Dom Patino after discharge    12 weeks gestation of pregnancy  Assessment & Plan  · Management per primary service      VTE Prophylaxis: Enoxaparin (Lovenox)       Recommendations for Discharge:  · Continue Lovenox  Follow-up with Ob/MFM/PCP    Counseling / Coordination of Care Time: 30 minutes  Greater than 50% of total time spent on patient counseling and coordination of care  Collaboration of Care: Were Recommendations Directly Discussed with Primary Treatment Team? - No     History of Present Illness:    Marcelo Mazariegos is a 31 y o  female who is originally admitted to the OBGYN service due to PE  She is currently on her 12 week of gestational   She recalled having left posterior knee pain 1-2 weeks ago which eventually went away  Three days ago she started having intermittent right upper chest/back pain  She tried to take Tylenol with no relief  She was seen at  [facility] and underwent a CT of the chest which confirmed the presence of a right anterior lung PE  She was transferred to [facility] for further treatment  She was started on Lovenox 1 milligram/kilogram dose by OBGYN   We are consulted for for her PE  This is her 1st episode  She has had for previous pregnancies without clot or PE  She denies family history of clot or PE  She was not sedentary  She was not taking Klonopin or Paxil anymore  At the time of my examination she had mild pain in the right upper chest   She had occasional nausea  She had no pain in the left knee  Review of Systems:    Review of Systems   Constitutional: Negative  HENT: Negative  Eyes: Negative  Respiratory: Negative  Cardiovascular: Positive for chest pain  Gastrointestinal: Positive for nausea  Genitourinary: Negative  Musculoskeletal:        Transient left posterior knee pain 1-2 weeks ago   Neurological: Negative  Hematological: Negative  All other systems reviewed and are negative  Past Medical and Surgical History:     Past Medical History:   Diagnosis Date    Irritable bowel syndrome     last assessed: 10/23/2015    Maternal obesity, antepartum     last assessed: 2017    Migraine        Past Surgical History:   Procedure Laterality Date     SECTION      FOOT SURGERY         Meds/Allergies:    PTA meds:   Prior to Admission Medications   Prescriptions Last Dose Informant Patient Reported? Taking?    PARoxetine (PAXIL) 20 mg tablet Not Taking at Unknown time Self Yes No   Sig: Take 20 mg by mouth daily   aluminum chloride (DRYSOL) 20 % external solution Not Taking at Unknown time Self Yes No   Sig: Apply topically daily at bedtime   cetirizine (ZyrTEC) 10 mg tablet Not Taking at Unknown time  No No   Sig: Take 1 tablet (10 mg total) by mouth daily   Patient not taking: Reported on 2021   clonazePAM (KlonoPIN) 0 5 mg tablet Not Taking at Unknown time Self Yes No   Sig: Take 0 5 mg by mouth 2 (two) times a day   hydrocortisone (ANUSOL-HC) 2 5 % rectal cream Not Taking at Unknown time  No No   Sig: Apply topically 2 (two) times a day   Patient not taking: Reported on 2021   methylPREDNISolone 4 MG tablet therapy pack Not Taking at Unknown time  No No   Sig: Use as directed on package   Patient not taking: Reported on 1/12/2021      Facility-Administered Medications: None       Allergies: Allergies   Allergen Reactions    Penicillin G Hives and Diarrhea     hives  N/V    Azithromycin Rash       Social History:     Marital Status: Single    Substance Use History:   Social History     Substance and Sexual Activity   Alcohol Use No     Social History     Tobacco Use   Smoking Status Never Smoker   Smokeless Tobacco Never Used     Social History     Substance and Sexual Activity   Drug Use No       Family History:    Family History   Problem Relation Age of Onset    Irritable bowel syndrome Mother     Ovarian cancer Family     Ovarian cancer Family        Physical Exam:     Vitals:   Blood Pressure: 124/80 (01/13/21 0846)  Pulse: 78 (01/13/21 0700)  Temperature: (!) 97 3 °F (36 3 °C) (01/13/21 0700)  Temp Source: Temporal (01/13/21 0700)  Respirations: 16 (01/13/21 0700)  SpO2: 98 % (01/13/21 0700)    Physical Exam  Vitals signs reviewed  Constitutional:       Appearance: Normal appearance  She is not ill-appearing or diaphoretic  HENT:      Head: Normocephalic and atraumatic  Nose: No rhinorrhea  Eyes:      General: No scleral icterus  Neck:      Musculoskeletal: Neck supple  Cardiovascular:      Rate and Rhythm: Regular rhythm  Heart sounds: No murmur  No gallop  Pulmonary:      Effort: Pulmonary effort is normal  No respiratory distress  Breath sounds: Normal breath sounds  No wheezing or rales  Abdominal:      General: Bowel sounds are normal  There is no distension  Tenderness: There is no abdominal tenderness  There is no guarding  Comments: Protuberant   Musculoskeletal:         General: No tenderness  Right lower leg: No edema  Left lower leg: No edema  Skin:     General: Skin is warm and dry     Neurological:      Mental Status: She is oriented to person, place, and time    Psychiatric:         Mood and Affect: Mood normal          Behavior: Behavior normal       Comments: Pleasant mood         Additional Data:     Lab Results: I have personally reviewed pertinent reports  Results from last 7 days   Lab Units 01/12/21  1117   WBC Thousand/uL 10 80   HEMOGLOBIN g/dL 12 9   HEMATOCRIT % 37 3*   PLATELETS Thousands/uL 266   NEUTROS PCT % 77*   LYMPHS PCT % 17*   MONOS PCT % 4   EOS PCT % 2     Results from last 7 days   Lab Units 01/12/21  1117   SODIUM mmol/L 135   POTASSIUM mmol/L 3 8   CHLORIDE mmol/L 102   CO2 mmol/L 24   BUN mg/dL 7   CREATININE mg/dL 0 66   ANION GAP mmol/L 9   CALCIUM mg/dL 8 9   GLUCOSE RANDOM mg/dL 93     Results from last 7 days   Lab Units 01/12/21  1117   INR  1 00     Results from last 7 days   Lab Units 01/12/21  1117   TROPONIN I ng/mL <0 03     No results found for: HGBA1C            Imaging: I have personally reviewed pertinent reports  No orders to display       EKG, Pathology, and Other Studies Reviewed on Admission:   · EKG: NSR    ** Please Note: This note has been constructed using a voice recognition system   **

## 2021-01-13 NOTE — PLAN OF CARE
Problem: PAIN - ADULT  Goal: Verbalizes/displays adequate comfort level or baseline comfort level  Description: Interventions:  - Encourage patient to monitor pain and request assistance  - Assess pain using appropriate pain scale  - Administer analgesics based on type and severity of pain and evaluate response  - Implement non-pharmacological measures as appropriate and evaluate response  - Consider cultural and social influences on pain and pain management  - Notify physician/advanced practitioner if interventions unsuccessful or patient reports new pain  Outcome: Progressing     Problem: INFECTION - ADULT  Goal: Absence or prevention of progression during hospitalization  Description: INTERVENTIONS:  - Assess and monitor for signs and symptoms of infection  - Monitor lab/diagnostic results  - Monitor all insertion sites, i e  indwelling lines, tubes, and drains  - Monitor endotracheal if appropriate and nasal secretions for changes in amount and color  - [place] appropriate cooling/warming therapies per order  - Administer medications as ordered  - Instruct and encourage patient and family to use good hand hygiene technique  - Identify and instruct in appropriate isolation precautions for identified infection/condition  Outcome: Progressing  Goal: Absence of fever/infection during neutropenic period  Description: INTERVENTIONS:  - Monitor WBC    Outcome: Progressing     Problem: SAFETY ADULT  Goal: Patient will remain free of falls  Description: INTERVENTIONS:  - Assess patient frequently for physical needs  -  Identify cognitive and physical deficits and behaviors that affect risk of falls    -  [place] fall precautions as indicated by assessment   - Educate patient/family on patient safety including physical limitations  - Instruct patient to call for assistance with activity based on assessment  - Modify environment to reduce risk of injury  - Consider OT/PT consult to assist with strengthening/mobility  Outcome: Progressing  Goal: Maintain or return to baseline ADL function  Description: INTERVENTIONS:  -  Assess patient's ability to carry out ADLs; assess patient's baseline for ADL function and identify physical deficits which impact ability to perform ADLs (bathing, care of mouth/teeth, toileting, grooming, dressing, etc )  - Assess/evaluate cause of self-care deficits   - Assess range of motion  - Assess patient's mobility; develop plan if impaired  - Assess patient's need for assistive devices and provide as appropriate  - Encourage maximum independence but intervene and supervise when necessary  - Involve family in performance of ADLs  - Assess for home care needs following discharge   - Consider OT consult to assist with ADL evaluation and planning for discharge  - Provide patient education as appropriate  Outcome: Progressing  Goal: Maintain or return mobility status to optimal level  Description: INTERVENTIONS:  - Assess patient's baseline mobility status (ambulation, transfers, stairs, etc )    - Identify cognitive and physical deficits and behaviors that affect mobility  - Identify mobility aids required to assist with transfers and/or ambulation (gait belt, sit-to-stand, lift, walker, cane, etc )  - Sod fall precautions as indicated by assessment  - Record patient progress and toleration of activity level on Mobility SBAR; progress patient to next Phase/Stage  - Instruct patient to call for assistance with activity based on assessment  - Consider rehabilitation consult to assist with strengthening/weightbearing, etc   Outcome: Progressing     Problem: DISCHARGE PLANNING  Goal: Discharge to home or other facility with appropriate resources  Description: INTERVENTIONS:  - Identify barriers to discharge w/patient and caregiver  - Arrange for needed discharge resources and transportation as appropriate  - Identify discharge learning needs (meds, wound care, etc )  - Arrange for interpretive services to assist at discharge as needed  - Refer to Case Management Department for coordinating discharge planning if the patient needs post-hospital services based on physician/advanced practitioner order or complex needs related to functional status, cognitive ability, or social support system  Outcome: Progressing     Problem: Knowledge Deficit  Goal: Patient/family/caregiver demonstrates understanding of disease process, treatment plan, medications, and discharge instructions  Description: Complete learning assessment and assess knowledge base    Interventions:  - Provide teaching at level of understanding  - Provide teaching via preferred learning methods  Outcome: Progressing

## 2021-08-27 PROCEDURE — 99284 EMERGENCY DEPT VISIT MOD MDM: CPT

## 2021-08-28 ENCOUNTER — HOSPITAL ENCOUNTER (EMERGENCY)
Facility: HOSPITAL | Age: 31
Discharge: HOME | End: 2021-08-28
Attending: EMERGENCY MEDICINE
Payer: COMMERCIAL

## 2021-08-28 ENCOUNTER — APPOINTMENT (EMERGENCY)
Dept: RADIOLOGY | Facility: HOSPITAL | Age: 31
End: 2021-08-28
Payer: COMMERCIAL

## 2021-08-28 ENCOUNTER — HOSPITAL ENCOUNTER (EMERGENCY)
Dept: NON INVASIVE DIAGNOSTICS | Facility: HOSPITAL | Age: 31
Discharge: HOME | End: 2021-08-28
Payer: COMMERCIAL

## 2021-08-28 VITALS
SYSTOLIC BLOOD PRESSURE: 110 MMHG | TEMPERATURE: 98.5 F | OXYGEN SATURATION: 97 % | BODY MASS INDEX: 37.56 KG/M2 | HEART RATE: 78 BPM | DIASTOLIC BLOOD PRESSURE: 58 MMHG | HEIGHT: 64 IN | WEIGHT: 220 LBS | RESPIRATION RATE: 20 BRPM

## 2021-08-28 DIAGNOSIS — M79.605 LEFT LEG PAIN: Primary | ICD-10-CM

## 2021-08-28 DIAGNOSIS — M79.605 LEFT LEG PAIN: ICD-10-CM

## 2021-08-28 LAB
ATRIAL RATE: 72 BPM
D DIMER PPP FEU-MCNC: 0.27 UG/ML FEU
P AXIS: 64 DEGREES
PR INTERVAL: 168 MS
QRS AXIS: 61 DEGREES
QRSD INTERVAL: 74 MS
QT INTERVAL: 384 MS
QTC INTERVAL: 420 MS
T WAVE AXIS: 54 DEGREES
VENTRICULAR RATE: 72 BPM

## 2021-08-28 PROCEDURE — 93970 EXTREMITY STUDY: CPT

## 2021-08-28 PROCEDURE — 73564 X-RAY EXAM KNEE 4 OR MORE: CPT

## 2021-08-28 PROCEDURE — 85379 FIBRIN DEGRADATION QUANT: CPT | Performed by: EMERGENCY MEDICINE

## 2021-08-28 PROCEDURE — 99285 EMERGENCY DEPT VISIT HI MDM: CPT | Performed by: EMERGENCY MEDICINE

## 2021-08-28 PROCEDURE — 93010 ELECTROCARDIOGRAM REPORT: CPT | Performed by: INTERNAL MEDICINE

## 2021-08-28 PROCEDURE — 36415 COLL VENOUS BLD VENIPUNCTURE: CPT | Performed by: EMERGENCY MEDICINE

## 2021-08-28 PROCEDURE — 96372 THER/PROPH/DIAG INJ SC/IM: CPT

## 2021-08-28 PROCEDURE — 93005 ELECTROCARDIOGRAM TRACING: CPT

## 2021-08-28 RX ADMIN — ENOXAPARIN SODIUM 100 MG: 100 INJECTION SUBCUTANEOUS at 01:31

## 2021-08-28 NOTE — ED PROVIDER NOTES
History  Chief Complaint   Patient presents with    Knee Pain     Behind left knee, 7/10 cramping leg pain, states she has hx of PE 2021 and was taken off lovenox three days ago     Patient is a 65-year-old female with history of PE,  who presents for evaluation of left leg pain  Patient was diagnosed in January with PE, stop Lovenox 3 days ago  Patient delivered 6 weeks ago  Patient says that starting this evening around 8:00 p m  She had the sudden onset of cramping pain in her left knee radiating up into her left thigh  No preceding trauma  She has not taken anything for the pain  She denies associated weakness, numbness, paresthesias  No alleviating or exacerbating factors  Patient had similar pain prior to PE  Patient has not had duplex performed to rule out DVT  She denies associated chest pain, dyspnea, nausea vomiting abdominal pain  No erythema or swelling is noted  Pain is moderate severity  Prior to Admission Medications   Prescriptions Last Dose Informant Patient Reported? Taking?    Prenatal Vit-Fe Fumarate-FA (Prenatal Vitamin) 27-0 8 MG TABS Not Taking at Unknown time  Yes No   Sig: Take 1 tablet by mouth daily   Patient not taking: Reported on 2021   acetaminophen (TYLENOL) 325 mg tablet Not Taking at Unknown time  No No   Sig: Take 3 tablets (975 mg total) by mouth every 6 (six) hours as needed for mild pain, moderate pain, headaches or fever   Patient not taking: Reported on 2021   aluminum chloride (DRYSOL) 20 % external solution Not Taking at Unknown time Self Yes No   Sig: Apply topically daily at bedtime   Patient not taking: Reported on 2021   enoxaparin (LOVENOX) 100 mg/mL   No No   Sig: Inject 0 9 mL (90 mg total) under the skin every 12 (twelve) hours   hydrocortisone (ANUSOL-HC) 2 5 % rectal cream Not Taking at Unknown time  No No   Sig: Apply topically 2 (two) times a day   Patient not taking: Reported on 2021      Facility-Administered Medications: None       Past Medical History:   Diagnosis Date    Irritable bowel syndrome     last assessed: 10/23/2015    Maternal obesity, antepartum     last assessed: 2017    Migraine        Past Surgical History:   Procedure Laterality Date     SECTION      FOOT SURGERY         Family History   Problem Relation Age of Onset    Irritable bowel syndrome Mother     Ovarian cancer Family     Ovarian cancer Family      I have reviewed and agree with the history as documented  E-Cigarette/Vaping    E-Cigarette Use Never User      E-Cigarette/Vaping Substances     Social History     Tobacco Use    Smoking status: Never Smoker    Smokeless tobacco: Never Used   Vaping Use    Vaping Use: Never used   Substance Use Topics    Alcohol use: No    Drug use: No       Review of Systems   Constitutional: Negative for fever  HENT: Negative for sore throat  Respiratory: Negative for shortness of breath  Cardiovascular: Negative for chest pain  Gastrointestinal: Negative for abdominal pain  Genitourinary: Negative for dysuria  Musculoskeletal: Negative for back pain  Left leg pain   Skin: Negative for rash  Neurological: Negative for light-headedness  Psychiatric/Behavioral: Negative for agitation  All other systems reviewed and are negative  Physical Exam  Physical Exam  Vitals reviewed  Constitutional:       General: She is not in acute distress  Appearance: She is well-developed  HENT:      Head: Normocephalic  Eyes:      Pupils: Pupils are equal, round, and reactive to light  Cardiovascular:      Rate and Rhythm: Normal rate and regular rhythm  Heart sounds: Normal heart sounds  Pulmonary:      Effort: Pulmonary effort is normal       Breath sounds: Normal breath sounds  Abdominal:      General: Bowel sounds are normal  There is no distension  Palpations: Abdomen is soft  Tenderness: There is no abdominal tenderness   There is no guarding  Musculoskeletal:         General: No tenderness or deformity  Normal range of motion  Cervical back: Normal range of motion and neck supple  Comments: Left leg:  Patient with no erythema or swelling left leg noted  Full range of motion, no bony tenderness  No tenderness to the calf or thigh noted   Skin:     General: Skin is warm and dry  Capillary Refill: Capillary refill takes less than 2 seconds  Neurological:      Mental Status: She is alert and oriented to person, place, and time  Cranial Nerves: No cranial nerve deficit  Sensory: No sensory deficit  Psychiatric:         Behavior: Behavior normal          Thought Content:  Thought content normal          Judgment: Judgment normal          Vital Signs  ED Triage Vitals [08/28/21 0025]   Temperature Pulse Respirations Blood Pressure SpO2   98 5 °F (36 9 °C) 71 18 130/69 96 %      Temp Source Heart Rate Source Patient Position - Orthostatic VS BP Location FiO2 (%)   Oral -- -- Left arm --      Pain Score       4           Vitals:    08/28/21 0025   BP: 130/69   Pulse: 71         Visual Acuity      ED Medications  Medications   enoxaparin (LOVENOX) subcutaneous injection 100 mg (has no administration in time range)       Diagnostic Studies  Results Reviewed     Procedure Component Value Units Date/Time    D-dimer, quantitative [[id number]]  (Normal) Collected: 08/28/21 0047    Lab Status: Final result Specimen: Blood from Arm, Left Updated: 08/28/21 0111     D-Dimer, Quant 0 27 ug/ml FEU                  XR knee 4+ vw left injury   ED Interpretation by Monae Tavarez MD (08/28 0114)   ED wet read:  No acute fracture/dislocation noted      VAS lower limb venous duplex study, complete bilateral    (Results Pending)              Procedures  Procedures         ED Course                                   Blaise' Criteria for DVT      Most Recent Value   Blaise' Criteria for DVT   Active cancer Treatment or palliation within 6 months  0 Filed at: 08/28/2021 2270   Bedridden recently >3 days or major surgery within 12 weeks  0 Filed at: 08/28/2021 0015   Calf swelling >3 cm compared to the other leg  0 Filed at: 08/28/2021 0015   Entire leg swollen  0 Filed at: 08/28/2021 0015   Collateral (nonvaricose) superficial veins present  0 Filed at: 08/28/2021 0015   Localized tenderness along the deep venous system  0 Filed at: 08/28/2021 0015   Pitting edema, confined to symptomatic leg  0 Filed at: 08/28/2021 0015   Paralysis, paresis, or recent plaster immobilization of the lower extremity  0 Filed at: 08/28/2021 0015   Previously documented DVT  1 Filed at: 08/28/2021 0827   Alternative diagnosis to DVT as likely or more likely  0 Filed at: 08/28/2021 0015   Wells DVT Critera Score  1 Filed at: 08/28/2021 0015              [facility]  Number of Diagnoses or Management Options  Left leg pain  Diagnosis management comments: Patient is a 51-year-old female presents for evaluation of left leg pain, concern for DVT  Patient with moderate Wells risk, negative D-dimer  Despite this, I am concerned the patient may have a DVT  Patient will be treated with 1 dose of Lovenox with instructions for bilateral duplex in the morning  Patient was given strict return precautions as well  Disposition  Final diagnoses:   Left leg pain     Time reflects when diagnosis was documented in both MDM as applicable and the Disposition within this note     Time User Action Codes Description Comment    8/28/2021  1:15 AM Ana Longo [[id number]] Left leg pain       ED Disposition     ED Disposition Condition Date/Time Comment    Discharge Stable Sat Aug 28, 2021  1:15 AM Sanjuanita Martin discharge to home/self care              Follow-up Information     Follow up With Specialties Details Why Contact Info Additional [address]  Emergency Department Emergency Medicine  If symptoms worsen [address] [facility] [id number]  [facility]  Emergency Department          Patient's Medications   Discharge Prescriptions    No medications on file     Outpatient Discharge Orders   VAS lower limb venous duplex study, complete bilateral   Standing Status: Future Standing Exp   Date: 08/28/25       PDMP Review     None          ED Provider  Electronically Signed by           Michaelle Kramer MD  08/28/21 1979

## 2021-08-29 PROCEDURE — 93970 EXTREMITY STUDY: CPT | Performed by: SURGERY

## 2021-09-14 ENCOUNTER — OFFICE VISIT (OUTPATIENT)
Dept: INTERNAL MEDICINE CLINIC | Facility: CLINIC | Age: 31
End: 2021-09-14
Payer: COMMERCIAL

## 2021-09-14 VITALS
WEIGHT: 221 LBS | SYSTOLIC BLOOD PRESSURE: 108 MMHG | HEART RATE: 80 BPM | DIASTOLIC BLOOD PRESSURE: 76 MMHG | OXYGEN SATURATION: 98 % | TEMPERATURE: 97.1 F | HEIGHT: 64 IN | BODY MASS INDEX: 37.73 KG/M2

## 2021-09-14 DIAGNOSIS — F41.9 ANXIETY AND DEPRESSION: Primary | ICD-10-CM

## 2021-09-14 DIAGNOSIS — F32.A ANXIETY AND DEPRESSION: Primary | ICD-10-CM

## 2021-09-14 DIAGNOSIS — Z86.711 HISTORY OF PULMONARY EMBOLISM: ICD-10-CM

## 2021-09-14 PROBLEM — Z15.89 COMPOUND HETEROZYGOUS MTHFR MUTATION C677T/A1298C: Status: ACTIVE | Noted: 2021-01-15

## 2021-09-14 PROBLEM — Z3A.12 12 WEEKS GESTATION OF PREGNANCY: Status: RESOLVED | Noted: 2021-01-12 | Resolved: 2021-09-14

## 2021-09-14 PROBLEM — K58.9 IRRITABLE BOWEL: Status: ACTIVE | Noted: 2017-09-14

## 2021-09-14 PROBLEM — O88.219 PULMONARY EMBOLISM COMPLICATING PREGNANCY: Status: ACTIVE | Noted: 2021-01-01

## 2021-09-14 PROCEDURE — 99213 OFFICE O/P EST LOW 20 MIN: CPT | Performed by: NURSE PRACTITIONER

## 2021-09-14 RX ORDER — PAROXETINE HYDROCHLORIDE 20 MG/1
20 TABLET, FILM COATED ORAL DAILY
Qty: 30 TABLET | Refills: 3 | Status: SHIPPED | OUTPATIENT
Start: 2021-09-14 | End: 2021-10-13 | Stop reason: ALTCHOICE

## 2021-09-14 RX ORDER — CLONAZEPAM 0.25 MG/1
0.25 TABLET, ORALLY DISINTEGRATING ORAL DAILY
Qty: 30 TABLET | Refills: 0 | Status: SHIPPED | OUTPATIENT
Start: 2021-09-14 | End: 2021-09-22 | Stop reason: ALTCHOICE

## 2021-09-14 NOTE — PATIENT INSTRUCTIONS
Low Fat Diet   AMBULATORY CARE:   A low-fat diet  is an eating plan that is low in total fat, unhealthy fat, and cholesterol  You may need to follow a low-fat diet if you have trouble digesting or absorbing fat  You may also need to follow this diet if you have high cholesterol  You can also lower your cholesterol by increasing the amount of fiber in your diet  Soluble fiber is a type of fiber that helps to decrease cholesterol levels  Different types of fat in food:   · Limit unhealthy fats  A diet that is high in cholesterol, saturated fat, and trans fat may cause unhealthy cholesterol levels  Unhealthy cholesterol levels increase your risk of heart disease  ? Cholesterol:  Limit intake of cholesterol to less than 200 mg per day  Cholesterol is found in meat, eggs, and dairy  ? Saturated fat:  Limit saturated fat to less than 7% of your total daily calories  Ask your dietitian how many calories you need each day  Saturated fat is found in butter, cheese, ice cream, whole milk, and palm oil  Saturated fat is also found in meat, such as beef, pork, chicken skin, and processed meats  Processed meats include sausage, hot dogs, and bologna  ? Trans fat:  Avoid trans fat as much as possible  Trans fat is used in fried and baked foods  Foods that say trans fat free on the label may still have up to 0 5 grams of trans fat per serving  · Include healthy fats  Replace foods that are high in saturated and trans fat with foods high in healthy fats  This may help to decrease high cholesterol levels  ? Monounsaturated fats: These are found in avocados, nuts, and vegetable oils, such as olive, canola, and sunflower oil  ? Polyunsaturated fats: These can be found in vegetable oils, such as soybean or corn oil  Omega-3 fats can help to decrease the risk of heart disease  Omega-3 fats are found in fish, such as salmon, herring, trout, and tuna   Omega-3 fats can also be found in plant foods, such as walnuts, flaxseed, soybeans, and canola oil  Foods to limit or avoid:   · Grains:      ? Snacks that are made with partially hydrogenated oils, such as chips, regular crackers, and butter-flavored popcorn    ? High-fat baked goods, such as biscuits, croissants, doughnuts, pies, cookies, and pastries    · Dairy:      ? Whole milk, 2% milk, and yogurt and ice cream made with whole milk    ? Half and half creamer, heavy cream, and whipping cream    ? Cheese, cream cheese, and sour cream    · Meats and proteins:      ? High-fat cuts of meat (T-bone steak, regular hamburger, and ribs)    ? Fried meat, poultry (turkey and chicken), and fish    ? Poultry (chicken and turkey) with skin    ? Cold cuts (salami or bologna), hot dogs, roper, and sausage    ? Whole eggs and egg yolks    · Vegetables and fruits with added fat:      ? Fried vegetables or vegetables in butter or high-fat sauces, such as cream or cheese sauces    ? Fried fruit or fruit served with butter or cream    · Fats:      ? Butter, stick margarine, and shortening    ? Coconut, palm oil, and palm kernel oil    Foods to include:   · Grains:      ? Whole-grain breads, cereals, pasta, and brown rice    ? Low-fat crackers and pretzels    · Vegetables and fruits:      ? Fresh, frozen, or canned vegetables (no salt or low-sodium)    ? Fresh, frozen, dried, or canned fruit (canned in light syrup or fruit juice)    ? Avocado    · Low-fat dairy products:      ? Nonfat (skim) or 1% milk    ? Nonfat or low-fat cheese, yogurt, and cottage cheese    · Meats and proteins:      ? Chicken or turkey with no skin    ? Baked or broiled fish    ? Lean beef and pork (loin, round, extra lean hamburger)    ? Beans and peas, unsalted nuts, soy products    ? Egg whites and substitutes    ? Seeds and nuts    · Fats:      ? Unsaturated oil, such as canola, olive, peanut, soybean, or sunflower oil    ? Soft or liquid margarine and vegetable oil spread    ?  Low-fat salad dressing    Other ways to decrease fat:   · Read food labels before you buy foods  Choose foods that have less than 30% of calories from fat  Choose low-fat or fat-free dairy products  Remember that fat free does not mean calorie free  These foods still contain calories, and too many calories can lead to weight gain  · Trim fat from meat and avoid fried food  Trim all visible fat from meat before you cook it  Remove the skin from poultry  Do not [place] meat, fish, or poultry  Bake, roast, boil, or broil these foods instead  Avoid fried foods  Eat a baked potato instead of [place] fries  Steam vegetables instead of sautéing them in butter  · Add less fat to foods  Use imitation roper bits on salads and baked potatoes instead of regular roper bits  Use fat-free or low-fat salad dressings instead of regular dressings  Use low-fat or nonfat butter-flavored topping instead of regular butter or margarine on popcorn and other foods  Ways to decrease fat in recipes:  Replace high-fat ingredients with low-fat or nonfat ones  This may cause baked goods to be drier than usual  You may need to use nonfat cooking spray on pans to prevent food from sticking  You also may need to change the amount of other ingredients, such as water, in the recipe  Try the following:  · Use low-fat or light margarine instead of regular margarine or shortening  · Use lean ground turkey breast or chicken, or lean ground beef (less than 5% fat) instead of hamburger  · Add 1 teaspoon of canola oil to 8 ounces of skim milk instead of using cream or half and half  · Use grated zucchini, carrots, or apples in breads instead of coconut  · Use blenderized, low-fat cottage cheese, plain tofu, or low-fat ricotta cheese instead of cream cheese  · Use 1 egg white and 1 teaspoon of canola oil, or use ¼ cup (2 ounces) of fat-free egg substitute instead of a whole egg       · Replace half of the oil that is called for in a recipe with applesauce when you bake  Use 3 tablespoons of cocoa powder and 1 tablespoon of canola oil instead of a square of baking chocolate  How to increase fiber:  Eat enough high-fiber foods to get 20 to 30 grams of fiber every day  Slowly increase your fiber intake to avoid stomach cramps, gas, and other problems  · Eat 3 ounces of whole-grain foods each day  An ounce is about 1 slice of bread  Eat whole-grain breads, such as whole-wheat bread  Whole wheat, whole-wheat flour, or other whole grains should be listed as the first ingredient on the food label  Replace white flour with whole-grain flour or use half of each in recipes  Whole-grain flour is heavier than white flour, so you may have to add more yeast or baking powder  · Eat a high-fiber cereal for breakfast   Oatmeal is a good source of soluble fiber  Look for cereals that have bran or fiber in the name  Choose whole-grain products, such as brown rice, barley, and whole-wheat pasta  · Eat more beans, peas, and lentils  For example, add beans to soups or salads  Eat at least 5 cups of fruits and vegetables each day  Eat fruits and vegetables with the peel because the peel is high in fiber  © Copyright [employer] 2021 Information is for End User's use only and may not be sold, redistributed or otherwise used for commercial purposes  All illustrations and images included in CareNotes® are the copyrighted property of A D A M , Inc  or [address] [place], [place] [address]isabela   The above information is an  only  It is not intended as medical advice for individual conditions or treatments  Talk to your doctor, nurse or pharmacist before following any medical regimen to see if it is safe and effective for you  Heart Healthy Diet   AMBULATORY CARE:   A heart healthy diet  is an eating plan low in unhealthy fats and sodium (salt)  The plan is high in healthy fats and fiber   A heart healthy diet helps improve your cholesterol levels and lowers your risk for heart disease and stroke  A dietitian will teach you how to read and understand food labels  Heart healthy diet guidelines to follow:   · Choose foods that contain healthy fats  ? Unsaturated fats  include monounsaturated and polyunsaturated fats  Unsaturated fat is found in foods such as soybean, canola, olive, corn, and safflower oils  It is also found in soft tub margarine that is made with liquid vegetable oil  ? Omega-3 fat  is found in certain fish, such as salmon, tuna, and trout, and in walnuts and flaxseed  Eat fish high in omega-3 fats at least 2 times a week  · Get 20 to 30 grams of fiber each day  Fruits, vegetables, whole-grain foods, and legumes (cooked beans) are good sources of fiber  · Limit or do not have unhealthy fats  ? Cholesterol  is found in animal foods, such as eggs and lobster, and in dairy products made from whole milk  Limit cholesterol to less than 200 mg each day  ? Saturated fat  is found in meats, such as roper and hamburger  It is also found in chicken or turkey skin, whole milk, and butter  Limit saturated fat to less than 7% of your total daily calories  ? Trans fat  is found in packaged foods, such as potato chips and cookies  It is also in hard margarine, some fried foods, and shortening  Do not eat foods that contain trans fats  · Limit sodium as directed  You may be told to limit sodium to 2,000 to 2,300 mg each day  Choose low-sodium or no-salt-added foods  Add little or no salt to food you prepare  Use herbs and spices in place of salt  Include the following in your heart healthy plan:  Ask your dietitian or healthcare provider how many servings to have from each of the following food groups:  · Grains:      ? Whole-wheat breads, cereals, and pastas, and brown rice    ? Low-fat, low-sodium crackers and chips    · Vegetables:      ? Broccoli, green beans, green peas, and spinach    ? Collards, kale, and lima beans    ?  Carrots, sweet potatoes, tomatoes, and peppers    ? Canned vegetables with no salt added    · Fruits:      ? Bananas, peaches, pears, and pineapple    ? Grapes, raisins, and dates    ? Oranges, tangerines, grapefruit, orange juice, and grapefruit juice    ? Apricots, mangoes, melons, and papaya    ? Raspberries and strawberries    ? Canned fruit with no added sugar    · Low-fat dairy:      ? Nonfat (skim) milk, 1% milk, and low-fat almond, cashew, or soy milks fortified with calcium    ? Low-fat cheese, regular or frozen yogurt, and cottage cheese    · Meats and proteins:      ? Lean cuts of beef and pork (loin, leg, round), skinless chicken and turkey    ? Legumes, soy products, egg whites, or nuts    Limit or do not include the following in your heart healthy plan:   · Unhealthy fats and oils:      ? Whole or 2% milk, cream cheese, sour cream, or cheese    ? High-fat cuts of beef (T-bone steaks, ribs), chicken or turkey with skin, and organ meats such as liver    ? Butter, stick margarine, shortening, and cooking oils such as coconut or palm oil    · Foods and liquids high in sodium:      ? Packaged foods, such as frozen dinners, cookies, macaroni and cheese, and cereals with more than 300 mg of sodium per serving    ? Vegetables with added sodium, such as instant potatoes, vegetables with added sauces, or regular canned vegetables    ? Cured or smoked meats, such as hot dogs, roper, and sausage    ? High-sodium ketchup, barbecue sauce, salad dressing, pickles, olives, soy sauce, or miso    · Foods and liquids high in sugar:      ? Candy, cake, cookies, pies, or doughnuts    ? Soft drinks (soda), sports drinks, or sweetened tea    ? Canned or dry mixes for cakes, soups, sauces, or gravies    Other healthy heart guidelines:   · Do not smoke  Nicotine and other chemicals in cigarettes and cigars can cause lung and heart damage  Ask your healthcare provider for information if you currently smoke and need help to quit  E-cigarettes or smokeless tobacco still contain nicotine  Talk to your healthcare provider before you use these products  · Limit or do not drink alcohol as directed  Alcohol can damage your heart and raise your blood pressure  Your healthcare provider may give you specific daily and weekly limits  The general recommended limit is 1 drink a day for women 21 or older and for men 72 or older  Do not have more than 3 drinks in a day or 7 in a week  The recommended limit is 2 drinks a day for men 24to 59years of age  Do not have more than 4 drinks in a day or 14 in a week  A drink of alcohol is 12 ounces of beer, 5 ounces of wine, or 1½ ounces of liquor  · Exercise regularly  Exercise can help you maintain a healthy weight and improve your blood pressure and cholesterol levels  Regular exercise can also decrease your risk for heart problems  Ask your healthcare provider about the best exercise plan for you  Do not start an exercise program without asking your healthcare provider  Follow up with your doctor or cardiologist as directed:  Write down your questions so you remember to ask them during your visits  © Copyright [employer] 2021 Information is for End User's use only and may not be sold, redistributed or otherwise used for commercial purposes  All illustrations and images included in CareNotes® are the copyrighted property of A D A M , Inc  or [facility] [employer]in   The above information is an  only  It is not intended as medical advice for individual conditions or treatments  Talk to your doctor, nurse or pharmacist before following any medical regimen to see if it is safe and effective for you

## 2021-09-14 NOTE — PROGRESS NOTES
Assessment/Plan: Will start on Paxil and Klonopin and does contract for safety  Inquiry done and consistent with prescribing history  Will refer to Hematology and did advise to take a low dose baby ASA daily  Will bring her back in one month or sooner if need be  No problem-specific Assessment & Plan notes found for this encounter  Problem List Items Addressed This Visit        Other    Anxiety and depression - Primary    Relevant Medications    PARoxetine (PAXIL) 20 mg tablet    clonazePAM (KlonoPIN) 0 25 MG disintegrating tablet    History of pulmonary embolism    Relevant Orders    Ambulatory referral to Hematology / Oncology    Postpartum depression    Relevant Medications    PARoxetine (PAXIL) 20 mg tablet    clonazePAM (KlonoPIN) 0 25 MG disintegrating tablet      Other Visit Diagnoses     BMI 37 0-37 9, adult                Subjective:      Patient ID: Tresa Brantley is a 31 y o  female  Rebecca is for an acute visit  She did deliver a healthy baby boy in July and is having some postpartum depression  She was on Paxil and Klonopin and is wondering if she could go back on this  She is not breastfeeding  She also is concerned about having a PE during pregnancy and being stopped off all her blood thinners  She feels she should have more of a workup done  She offers no other issues  The following portions of the patient's history were reviewed and updated as appropriate: She  has a past medical history of Irritable bowel syndrome, Maternal obesity, antepartum, and Migraine    She   Patient Active Problem List    Diagnosis Date Noted    History of pulmonary embolism 09/14/2021    Postpartum depression 09/14/2021    Compound heterozygous MTHFR mutation [id number] 01/15/2021    Pulmonary embolism complicating pregnancy 08/84/6453    Endometrial polyp 06/26/2020    BMI 30 0-30 9,adult 02/26/2020    Migraine with status migrainosus, not intractable 06/06/2019    Cluster headache, not intractable 2019    Hyperhidrosis 10/04/2018    Anxiety and depression 2018    Sweating abnormality 2018    Anxiety 2018    Current mild episode of major depressive disorder without prior episode ([facility] [address] ) 2018    Irritable bowel 2017    Menorrhagia 2017     She  has a past surgical history that includes  section and Foot surgery  Her family history includes Irritable bowel syndrome in her mother; Ovarian cancer in her family and family  She  reports that she has never smoked  She has never used smokeless tobacco  She reports that she does not drink alcohol and does not use drugs  Current Outpatient Medications   Medication Sig Dispense Refill    clonazePAM (KlonoPIN) 0 25 MG disintegrating tablet Take 1 tablet (0 25 mg total) by mouth daily As needed 30 tablet 0    PARoxetine (PAXIL) 20 mg tablet Take 1 tablet (20 mg total) by mouth daily 30 tablet 3    Prenatal Vit-Fe Fumarate-FA (Prenatal Vitamin) 27-0 8 MG TABS Take 1 tablet by mouth daily (Patient not taking: Reported on 2021)       No current facility-administered medications for this visit       Current Outpatient Medications on File Prior to Visit   Medication Sig    Prenatal Vit-Fe Fumarate-FA (Prenatal Vitamin) 27-0 8 MG TABS Take 1 tablet by mouth daily (Patient not taking: Reported on 2021)    [DISCONTINUED] acetaminophen (TYLENOL) 325 mg tablet Take 3 tablets (975 mg total) by mouth every 6 (six) hours as needed for mild pain, moderate pain, headaches or fever (Patient not taking: Reported on 2021)    [DISCONTINUED] aluminum chloride (DRYSOL) 20 % external solution Apply topically daily at bedtime (Patient not taking: Reported on 2021)    [DISCONTINUED] enoxaparin (LOVENOX) 100 mg/mL Inject 0 9 mL (90 mg total) under the skin every 12 (twelve) hours (Patient not taking: Reported on 2021)    [DISCONTINUED] hydrocortisone (ANUSOL-HC) 2 5 % rectal cream Apply topically 2 (two) times a day (Patient not taking: Reported on 1/13/2021)     No current facility-administered medications on file prior to visit  She is allergic to penicillin g and azithromycin       Review of Systems   All other systems reviewed and are negative  Objective:      /76 (BP Location: Left arm, Patient Position: Sitting, Cuff Size: Large)   Pulse 80   Temp (!) 97 1 °F (36 2 °C) (Temporal)   Ht 5' 4" (1 626 m)   Wt 100 kg (221 lb)   LMP 10/20/2020 (Approximate) Comment: 12 weeks  SpO2 98%   BMI 37 93 kg/m²          Physical Exam  Vitals reviewed  Constitutional:       Appearance: Normal appearance  She is normal weight  Cardiovascular:      Rate and Rhythm: Normal rate and regular rhythm  Pulses: Normal pulses  Heart sounds: Normal heart sounds  Pulmonary:      Effort: Pulmonary effort is normal       Breath sounds: Normal breath sounds  Musculoskeletal:         General: Normal range of motion  Skin:     General: Skin is warm and dry  Capillary Refill: Capillary refill takes less than 2 seconds  Neurological:      General: No focal deficit present  Mental Status: She is alert and oriented to person, place, and time  Mental status is at baseline  Psychiatric:         Mood and Affect: Mood normal          Behavior: Behavior normal          Thought Content: Thought content normal          BMI Counseling: Body mass index is 37 93 kg/m²  The BMI is above normal  Nutrition recommendations include reducing portion sizes, decreasing overall calorie intake, 3-5 servings of fruits/vegetables daily, reducing fast food intake, consuming healthier snacks, decreasing soda and/or juice intake, moderation in carbohydrate intake, increasing intake of lean protein, reducing intake of saturated fat and trans fat and reducing intake of cholesterol

## 2021-09-21 ENCOUNTER — TELEPHONE (OUTPATIENT)
Dept: INTERNAL MEDICINE CLINIC | Facility: CLINIC | Age: 31
End: 2021-09-21

## 2021-09-21 NOTE — TELEPHONE ENCOUNTER
Patient called regarding the script for Klonopin 0 25  The pharmacist said if you prescribe the regular pill and not the disintegrating then it probably will not need a prior authorization  Please try that

## 2021-09-22 ENCOUNTER — TELEPHONE (OUTPATIENT)
Dept: HEMATOLOGY ONCOLOGY | Facility: CLINIC | Age: 31
End: 2021-09-22

## 2021-09-22 DIAGNOSIS — F41.9 ANXIETY: Primary | ICD-10-CM

## 2021-09-22 RX ORDER — CLONAZEPAM 0.5 MG/1
0.5 TABLET ORAL DAILY
Qty: 30 TABLET | Refills: 0 | Status: SHIPPED | OUTPATIENT
Start: 2021-09-22 | End: 2021-10-29 | Stop reason: SDUPTHER

## 2021-09-28 ENCOUNTER — TELEMEDICINE (OUTPATIENT)
Dept: INTERNAL MEDICINE CLINIC | Facility: CLINIC | Age: 31
End: 2021-09-28
Payer: COMMERCIAL

## 2021-09-28 DIAGNOSIS — Z20.822 EXPOSURE TO COVID-19 VIRUS: Primary | ICD-10-CM

## 2021-09-28 PROCEDURE — U0005 INFEC AGEN DETEC AMPLI PROBE: HCPCS | Performed by: NURSE PRACTITIONER

## 2021-09-28 PROCEDURE — U0003 INFECTIOUS AGENT DETECTION BY NUCLEIC ACID (DNA OR RNA); SEVERE ACUTE RESPIRATORY SYNDROME CORONAVIRUS 2 (SARS-COV-2) (CORONAVIRUS DISEASE [COVID-19]), AMPLIFIED PROBE TECHNIQUE, MAKING USE OF HIGH THROUGHPUT TECHNOLOGIES AS DESCRIBED BY CMS-2020-01-R: HCPCS | Performed by: NURSE PRACTITIONER

## 2021-09-28 PROCEDURE — 99213 OFFICE O/P EST LOW 20 MIN: CPT | Performed by: NURSE PRACTITIONER

## 2021-09-28 NOTE — PROGRESS NOTES
COVID-19 Outpatient Progress Note    Assessment/Plan: Patient started with symptoms on Thursday and did start with loss of taste or smell  Is not vaccinated and recently did give birth  Will bring to office for swab and will notify once back  Problem List Items Addressed This Visit     None      Visit Diagnoses     Exposure to COVID-19 virus    -  Primary    Relevant Orders    Novel Coronavirus (Covid-19),PCR [facility] - Collected in Office         Disposition:     I have spent 10 minutes directly with the patient  Verification of patient location:    Patient is located in the following state in which I hold an active license [place]    Encounter provider Pino Ruby [place]    Provider located at [address] [id number]    Recent Visits  Date Type Provider Dept   09/21/21 Telephone David  [address] [facility] [place]   Showing recent visits within past 7 days and meeting all other requirements  Today's Visits  Date Type Provider Dept   09/28/21 Telemedicine [facility] [place]   Showing today's visits and meeting all other requirements  Future Appointments  No visits were found meeting these conditions  Showing future appointments within next 150 days and meeting all other requirements     This virtual check-in was done via [employer] and patient was informed that this is a secure, HIPAA-compliant platform  She agrees to proceed  Patient agrees to participate in a virtual check in via telephone or video visit instead of presenting to the office to address urgent/immediate medical needs  Patient is aware this is a billable service  After connecting through [facility], the patient was identified by name and date of birth  Lakeisha Meredith was informed that this was a telemedicine visit and that the exam was being conducted confidentially over secure lines  My office door was closed  No one else was in the room  Sanjuanita Martin acknowledged consent and understanding of privacy and security of the telemedicine visit  I informed the patient that I have reviewed her record in Epic and presented the opportunity for her to ask any questions regarding the visit today  The patient agreed to participate  Subjective:   Sanjuanita Mratin is a 31 y o  female who is concerned about COVID-19  Patient's symptoms include nasal congestion, anosmia, loss of taste, myalgias and headache       Date of symptom onset: 2021  COVID-19 vaccination status: Not vaccinated    Exposure:   Contact with a person who is under investigation (PUI) for or who is positive for COVID-19 within the last 14 days?: No    Hospitalized recently for fever and/or lower respiratory symptoms?: No      Currently a healthcare worker that is involved in direct patient care?: No      Works in a special setting where the risk of COVID-19 transmission may be high? (this may include long-term care, correctional and penitentiary facilities; homeless shelters; assisted-living facilities and group homes ): No      Resident in a special setting where the risk of COVID-19 transmission may be high? (this may include long-term care, correctional and penitentiary facilities; homeless shelters; assisted-living facilities and group homes ): No      Lab Results   Component Value Date    SARSCOV2 Negative 2021    Cliff Truong Not Detected 2020     Past Medical History:   Diagnosis Date    Irritable bowel syndrome     last assessed: 10/23/2015    Maternal obesity, antepartum     last assessed: 2017    Migraine      Past Surgical History:   Procedure Laterality Date     SECTION      FOOT SURGERY       Current Outpatient Medications   Medication Sig Dispense Refill    clonazePAM (KlonoPIN) 0 5 mg tablet Take 1 tablet (0 5 mg total) by mouth daily As needed 30 tablet 0    PARoxetine (PAXIL) 20 mg tablet Take 1 tablet (20 mg total) by mouth daily 30 tablet 3    Prenatal Vit-Fe Fumarate-FA (Prenatal Vitamin) 27-0 8 MG TABS Take 1 tablet by mouth daily (Patient not taking: Reported on 8/28/2021)       No current facility-administered medications for this visit  Allergies   Allergen Reactions    Penicillin G Hives and Diarrhea     hives  N/V    Azithromycin Rash       Review of Systems   HENT: Positive for congestion  Musculoskeletal: Positive for myalgias  Neurological: Positive for headaches  Objective: There were no vitals filed for this visit  Physical Exam  Constitutional:       Appearance: Normal appearance  Neurological:      Mental Status: She is alert  Psychiatric:         Mood and Affect: Mood normal          Behavior: Behavior normal          Thought Content: Thought content normal          Judgment: Judgment normal          VIRTUAL VISIT DISCLAIMER    Rebecca Erickson verbally agrees to participate in [employer]  Pt is aware that [employer] could be limited without vital signs or the ability to perform a full hands-on physical Wilfredohan Tong understands she or the provider may request at any time to terminate the video visit and request the patient to seek care or treatment in person

## 2021-10-13 ENCOUNTER — OFFICE VISIT (OUTPATIENT)
Dept: INTERNAL MEDICINE CLINIC | Facility: CLINIC | Age: 31
End: 2021-10-13
Payer: COMMERCIAL

## 2021-10-13 VITALS
HEIGHT: 64 IN | SYSTOLIC BLOOD PRESSURE: 112 MMHG | HEART RATE: 100 BPM | BODY MASS INDEX: 36.54 KG/M2 | DIASTOLIC BLOOD PRESSURE: 72 MMHG | TEMPERATURE: 97.2 F | OXYGEN SATURATION: 99 % | WEIGHT: 214 LBS

## 2021-10-13 DIAGNOSIS — F32.A ANXIETY AND DEPRESSION: Primary | ICD-10-CM

## 2021-10-13 DIAGNOSIS — F41.9 ANXIETY AND DEPRESSION: Primary | ICD-10-CM

## 2021-10-13 DIAGNOSIS — Z86.16 HISTORY OF COVID-19: ICD-10-CM

## 2021-10-13 PROCEDURE — 99213 OFFICE O/P EST LOW 20 MIN: CPT | Performed by: NURSE PRACTITIONER

## 2021-10-13 RX ORDER — PAROXETINE HYDROCHLORIDE 40 MG/1
40 TABLET, FILM COATED ORAL EVERY MORNING
Qty: 30 TABLET | Refills: 3 | Status: SHIPPED | OUTPATIENT
Start: 2021-10-13 | End: 2022-03-04 | Stop reason: SDUPTHER

## 2021-10-29 DIAGNOSIS — F41.9 ANXIETY: ICD-10-CM

## 2021-10-29 RX ORDER — CLONAZEPAM 0.5 MG/1
0.5 TABLET ORAL 2 TIMES DAILY
Qty: 60 TABLET | Refills: 0 | Status: SHIPPED | OUTPATIENT
Start: 2021-10-29 | End: 2021-11-30 | Stop reason: SDUPTHER

## 2021-11-30 DIAGNOSIS — F41.9 ANXIETY: ICD-10-CM

## 2021-11-30 RX ORDER — CLONAZEPAM 0.5 MG/1
0.5 TABLET ORAL 2 TIMES DAILY
Qty: 60 TABLET | Refills: 0 | Status: SHIPPED | OUTPATIENT
Start: 2021-11-30 | End: 2022-01-19 | Stop reason: SDUPTHER

## 2021-12-26 ENCOUNTER — OFFICE VISIT (OUTPATIENT)
Dept: URGENT CARE | Facility: CLINIC | Age: 31
End: 2021-12-26
Payer: COMMERCIAL

## 2021-12-26 VITALS
BODY MASS INDEX: 36.73 KG/M2 | WEIGHT: 214 LBS | HEART RATE: 70 BPM | OXYGEN SATURATION: 98 % | TEMPERATURE: 97.7 F | RESPIRATION RATE: 18 BRPM

## 2021-12-26 DIAGNOSIS — H66.92 LEFT OTITIS MEDIA, UNSPECIFIED OTITIS MEDIA TYPE: Primary | ICD-10-CM

## 2021-12-26 DIAGNOSIS — H92.02 LEFT EAR PAIN: ICD-10-CM

## 2021-12-26 PROCEDURE — 87636 SARSCOV2 & INF A&B AMP PRB: CPT | Performed by: PHYSICIAN ASSISTANT

## 2021-12-26 PROCEDURE — 99214 OFFICE O/P EST MOD 30 MIN: CPT | Performed by: PHYSICIAN ASSISTANT

## 2021-12-26 RX ORDER — AZITHROMYCIN 250 MG/1
TABLET, FILM COATED ORAL
Qty: 6 TABLET | Refills: 0 | Status: SHIPPED | OUTPATIENT
Start: 2021-12-26 | End: 2021-12-30

## 2021-12-27 LAB
FLUAV RNA RESP QL NAA+PROBE: NEGATIVE
FLUBV RNA RESP QL NAA+PROBE: NEGATIVE
SARS-COV-2 RNA RESP QL NAA+PROBE: NEGATIVE

## 2022-01-19 DIAGNOSIS — F41.9 ANXIETY: ICD-10-CM

## 2022-01-19 RX ORDER — CLONAZEPAM 0.5 MG/1
0.5 TABLET ORAL 2 TIMES DAILY
Qty: 60 TABLET | Refills: 0 | Status: SHIPPED | OUTPATIENT
Start: 2022-01-19 | End: 2022-03-04 | Stop reason: SDUPTHER

## 2022-02-16 ENCOUNTER — TELEMEDICINE (OUTPATIENT)
Dept: INTERNAL MEDICINE CLINIC | Facility: CLINIC | Age: 32
End: 2022-02-16
Payer: COMMERCIAL

## 2022-02-16 DIAGNOSIS — B34.9 VIRAL INFECTION, UNSPECIFIED: Primary | ICD-10-CM

## 2022-02-16 PROCEDURE — 99213 OFFICE O/P EST LOW 20 MIN: CPT | Performed by: FAMILY MEDICINE

## 2022-02-16 PROCEDURE — 87811 SARS-COV-2 COVID19 W/OPTIC: CPT | Performed by: FAMILY MEDICINE

## 2022-02-17 VITALS — TEMPERATURE: 98.7 F | OXYGEN SATURATION: 96 % | HEART RATE: 84 BPM

## 2022-02-17 LAB
SARS-COV-2 AG UPPER RESP QL IA: NEGATIVE
VALID CONTROL: NORMAL

## 2022-02-17 NOTE — PROGRESS NOTES
COVID-19 Outpatient Progress Note    Assessment/Plan:    Problem List Items Addressed This Visit     None      Visit Diagnoses     Viral infection, unspecified    -  Primary    Relevant Orders    Poct Covid 19 Rapid Antigen Test         Disposition:     Rapid antigen testing was performed and the result is negative for COVID-19  Rapid coronavirus testing negative  Discussed with her the possibility of other viral infections  Still possibly coronavirus but outside the window of positive for the testing  Fluids  Rest   Watch for any worsening symptoms  Stay adequately hydrated  Advised her to call or follow-up if symptoms worsen or persist     I have spent 15 minutes directly with the patient  Greater than 50% of this time was spent in counseling/coordination of care regarding: diagnostic results, instructions for management, patient and family education, importance of treatment compliance, risk factor reductions and impressions  Encounter provider Clau Wheatley MD    Provider located at [address] [place] [address]  [address] [place] [address] [place] [id number]    Recent Visits  Date Type Provider Dept   02/16/22 Ann Dhaliwal MD  [facility] [place]   Showing recent visits within past 7 days and meeting all other requirements  Future Appointments  No visits were found meeting these conditions  Showing future appointments within next 150 days and meeting all other requirements     Subjective:   Danish Francois is a 36 y o  female who is concerned about COVID-19  Patient's symptoms include chills, fatigue, nasal congestion, rhinorrhea, sore throat, cough, nausea, diarrhea, myalgias and headache   Patient denies fever, malaise, anosmia, loss of taste, shortness of breath, chest tightness, abdominal pain and vomiting      - Date of symptom onset: 2/12/2022      COVID-19 vaccination status: Fully vaccinated (primary series)    Exposure:   Contact with a person who is under investigation (PUI) for or who is positive for COVID-19 within the last 14 days?: No    Hospitalized recently for fever and/or lower respiratory symptoms?: No      Currently a healthcare worker that is involved in direct patient care?: No      Works in a special setting where the risk of COVID-19 transmission may be high? (this may include long-term care, correctional and long term facilities; homeless shelters; assisted-living facilities and group homes ): No      Resident in a special setting where the risk of COVID-19 transmission may be high? (this may include long-term care, correctional and long term facilities; homeless shelters; assisted-living facilities and group homes ): No      Lab Results   Component Value Date    SARSCOV2 Negative 2021    [facility] Not Detected 2020     Past Medical History:   Diagnosis Date    Irritable bowel syndrome     last assessed: 10/23/2015    Maternal obesity, antepartum     last assessed: 2017    Migraine      Past Surgical History:   Procedure Laterality Date     SECTION      FOOT SURGERY       Current Outpatient Medications   Medication Sig Dispense Refill    clonazePAM (KlonoPIN) 0 5 mg tablet Take 1 tablet (0 5 mg total) by mouth 2 (two) times a day As needed 60 tablet 0    PARoxetine (PAXIL) 40 MG tablet Take 1 tablet (40 mg total) by mouth every morning 30 tablet 3    Prenatal Vit-Fe Fumarate-FA (Prenatal Vitamin) 27-0 8 MG TABS Take 1 tablet by mouth daily (Patient not taking: Reported on 2021)       No current facility-administered medications for this visit  Allergies   Allergen Reactions    Penicillin G Hives and Diarrhea     hives  N/V    Azithromycin Rash       Review of Systems   Constitutional: Positive for chills and fatigue  Negative for fever  HENT: Positive for congestion, rhinorrhea and sore throat  Respiratory: Positive for cough   Negative for chest tightness and shortness of breath  Gastrointestinal: Positive for diarrhea and nausea  Negative for abdominal pain and vomiting  Musculoskeletal: Positive for myalgias  Neurological: Positive for headaches  Objective:    Vitals:    02/16/22 1300   Pulse: 84   Temp: 98 7 °F (37 1 °C)   SpO2: 96%       Physical Exam  Vitals reviewed  Constitutional:       General: She is not in acute distress  Appearance: She is well-developed and well-groomed  She is not ill-appearing  HENT:      Head:      Comments: Moist mucous membranes; boggy nasal mucosa with clear nasal discharge; no significant pharyngeal erythema or exudate  Cardiovascular:      Rate and Rhythm: Normal rate and regular rhythm  Pulmonary:      Breath sounds: No decreased breath sounds, wheezing or rhonchi  Musculoskeletal:      Cervical back: Neck supple  Lymphadenopathy:      Cervical: No cervical adenopathy  Neurological:      Mental Status: She is alert  Psychiatric:         Behavior: Behavior is cooperative  VIRTUAL VISIT DISCLAIMER    Kamla Rater verbally agrees to participate in [employer]  Pt is aware that [employer] could be limited without vital signs or the ability to perform a full hands-on physical Sandra Grit understands she or the provider may request at any time to terminate the video visit and request the patient to seek care or treatment in person

## 2022-03-04 DIAGNOSIS — F41.9 ANXIETY: ICD-10-CM

## 2022-03-04 DIAGNOSIS — F32.A ANXIETY AND DEPRESSION: ICD-10-CM

## 2022-03-04 DIAGNOSIS — F41.9 ANXIETY AND DEPRESSION: ICD-10-CM

## 2022-03-07 RX ORDER — CLONAZEPAM 0.5 MG/1
0.5 TABLET ORAL 2 TIMES DAILY
Qty: 60 TABLET | Refills: 0 | Status: SHIPPED | OUTPATIENT
Start: 2022-03-07 | End: 2022-04-20 | Stop reason: SDUPTHER

## 2022-03-07 RX ORDER — PAROXETINE HYDROCHLORIDE 40 MG/1
40 TABLET, FILM COATED ORAL EVERY MORNING
Qty: 30 TABLET | Refills: 3 | Status: SHIPPED | OUTPATIENT
Start: 2022-03-07 | End: 2022-04-20 | Stop reason: SDUPTHER

## 2022-04-20 DIAGNOSIS — F32.A ANXIETY AND DEPRESSION: ICD-10-CM

## 2022-04-20 DIAGNOSIS — F41.9 ANXIETY AND DEPRESSION: ICD-10-CM

## 2022-04-20 DIAGNOSIS — F41.9 ANXIETY: ICD-10-CM

## 2022-04-21 RX ORDER — PAROXETINE HYDROCHLORIDE 40 MG/1
40 TABLET, FILM COATED ORAL EVERY MORNING
Qty: 30 TABLET | Refills: 0 | Status: SHIPPED | OUTPATIENT
Start: 2022-04-21 | End: 2022-06-27 | Stop reason: SDUPTHER

## 2022-04-21 RX ORDER — CLONAZEPAM 0.5 MG/1
0.5 TABLET ORAL 2 TIMES DAILY
Qty: 60 TABLET | Refills: 0 | Status: SHIPPED | OUTPATIENT
Start: 2022-04-21 | End: 2022-06-27 | Stop reason: SDUPTHER

## 2022-06-27 DIAGNOSIS — F41.9 ANXIETY: ICD-10-CM

## 2022-06-27 DIAGNOSIS — F32.A ANXIETY AND DEPRESSION: ICD-10-CM

## 2022-06-27 DIAGNOSIS — F41.9 ANXIETY AND DEPRESSION: ICD-10-CM

## 2022-06-27 RX ORDER — PAROXETINE HYDROCHLORIDE 40 MG/1
40 TABLET, FILM COATED ORAL EVERY MORNING
Qty: 30 TABLET | Refills: 0 | Status: SHIPPED | OUTPATIENT
Start: 2022-06-27

## 2022-06-27 RX ORDER — CLONAZEPAM 0.5 MG/1
0.5 TABLET ORAL 2 TIMES DAILY
Qty: 60 TABLET | Refills: 0 | Status: SHIPPED | OUTPATIENT
Start: 2022-06-27

## 2022-07-28 ENCOUNTER — OFFICE VISIT (OUTPATIENT)
Dept: INTERNAL MEDICINE CLINIC | Facility: CLINIC | Age: 32
End: 2022-07-28
Payer: COMMERCIAL

## 2022-07-28 ENCOUNTER — HOSPITAL ENCOUNTER (OUTPATIENT)
Dept: NON INVASIVE DIAGNOSTICS | Facility: HOSPITAL | Age: 32
Discharge: HOME | End: 2022-07-28
Payer: COMMERCIAL

## 2022-07-28 VITALS
HEIGHT: 64 IN | TEMPERATURE: 97.7 F | DIASTOLIC BLOOD PRESSURE: 82 MMHG | BODY MASS INDEX: 36.38 KG/M2 | WEIGHT: 213.1 LBS | SYSTOLIC BLOOD PRESSURE: 122 MMHG | HEART RATE: 84 BPM | OXYGEN SATURATION: 98 %

## 2022-07-28 DIAGNOSIS — Z86.711 HISTORY OF PULMONARY EMBOLISM: ICD-10-CM

## 2022-07-28 DIAGNOSIS — R58 ECCHYMOSIS: ICD-10-CM

## 2022-07-28 DIAGNOSIS — F32.A ANXIETY AND DEPRESSION: ICD-10-CM

## 2022-07-28 DIAGNOSIS — Z00.00 ROUTINE ADULT HEALTH MAINTENANCE: Primary | ICD-10-CM

## 2022-07-28 DIAGNOSIS — Z13.6 SCREENING FOR CARDIOVASCULAR CONDITION: ICD-10-CM

## 2022-07-28 DIAGNOSIS — G44.009 CLUSTER HEADACHE, NOT INTRACTABLE, UNSPECIFIED CHRONICITY PATTERN: ICD-10-CM

## 2022-07-28 DIAGNOSIS — R61 HYPERHIDROSIS: ICD-10-CM

## 2022-07-28 DIAGNOSIS — F41.9 ANXIETY AND DEPRESSION: ICD-10-CM

## 2022-07-28 PROCEDURE — 93971 EXTREMITY STUDY: CPT

## 2022-07-28 PROCEDURE — 93971 EXTREMITY STUDY: CPT | Performed by: SURGERY

## 2022-07-28 PROCEDURE — 99395 PREV VISIT EST AGE 18-39: CPT | Performed by: NURSE PRACTITIONER

## 2022-07-28 RX ORDER — RIZATRIPTAN BENZOATE 10 MG/1
10 TABLET ORAL AS NEEDED
Qty: 18 TABLET | Refills: 0 | Status: SHIPPED | OUTPATIENT
Start: 2022-07-28

## 2022-07-28 NOTE — PROGRESS NOTES
Assessment/Plan: Will order a doppler of the left lower leg  Will order fasting labs  Continue on Paxil and Klonopin  Did have covid vaccines  Will follow up in one year or sooner if need be  No problem-specific Assessment & Plan notes found for this encounter  Problem List Items Addressed This Visit        Nervous and Auditory    Cluster headache, not intractable    Relevant Medications    rizatriptan (MAXALT) 10 mg tablet       Musculoskeletal and Integument    Hyperhidrosis    Relevant Orders    Comprehensive metabolic panel    CBC and differential    TSH, 3rd generation with Free T4 reflex       Other    Anxiety and depression    Relevant Orders    Comprehensive metabolic panel    CBC and differential    TSH, 3rd generation with Free T4 reflex    History of pulmonary embolism    Relevant Orders    Comprehensive metabolic panel    CBC and differential    TSH, 3rd generation with Free T4 reflex    Routine adult health maintenance - Primary    Relevant Orders    Comprehensive metabolic panel    CBC and differential    TSH, 3rd generation with Free T4 reflex    Ecchymosis    Relevant Orders    Comprehensive metabolic panel    CBC and differential    TSH, 3rd generation with Free T4 reflex    VAS lower limb venous duplex study, unilateral/limited    Screening for cardiovascular condition    Relevant Orders    Lipid panel            Subjective:      Patient ID: Candice Murrell is a 36 y o  female  Rebecca is for a wellness  She is having bruising on the back of her calf  She is worried about a DVT due to having PE in the past  She is not having any SOB but is anxious  She is continuing Klonopin and Paxil and is tolerating this well  She denies any chest pain, SOB, or palpitations  She is getting her menses on a regular basis  She did get her covid vaccines  She offers no other issues        The following portions of the patient's history were reviewed and updated as appropriate: She  has a past medical history of Irritable bowel syndrome, Maternal obesity, antepartum, and Migraine  She   Patient Active Problem List    Diagnosis Date Noted    Routine adult health maintenance 2022    Ecchymosis 2022    Screening for cardiovascular condition 2022    History of COVID-19 10/13/2021    History of pulmonary embolism 2021    Postpartum depression 2021    Compound heterozygous MTHFR mutation [id number] 01/15/2021    Pulmonary embolism complicating pregnancy     Endometrial polyp 2020    BMI 30 0-30 9,adult 2020    Migraine with status migrainosus, not intractable 2019    Cluster headache, not intractable 2019    Hyperhidrosis 10/04/2018    Anxiety and depression 2018    Sweating abnormality 2018    Anxiety 2018    Current mild episode of major depressive disorder without prior episode ([facility] [address] ) 2018    Irritable bowel 2017    Menorrhagia 2017     She  has a past surgical history that includes  section and Foot surgery  Her family history includes Irritable bowel syndrome in her mother; Ovarian cancer in her family and family  She  reports that she has never smoked  She has never used smokeless tobacco  She reports that she does not drink alcohol and does not use drugs  Current Outpatient Medications   Medication Sig Dispense Refill    clonazePAM (KlonoPIN) 0 5 mg tablet Take 1 tablet (0 5 mg total) by mouth 2 (two) times a day As needed 60 tablet 0    PARoxetine (PAXIL) 40 MG tablet Take 1 tablet (40 mg total) by mouth every morning 30 tablet 0    rizatriptan (MAXALT) 10 mg tablet Take 1 tablet (10 mg total) by mouth as needed for migraine Take at the onset of migraine; if symptoms continue or return, may take another dose at least 2 hours after first dose  Take no more than 2 doses in a day  18 tablet 0     No current facility-administered medications for this visit       Current Outpatient Medications on File Prior to Visit   Medication Sig    clonazePAM (KlonoPIN) 0 5 mg tablet Take 1 tablet (0 5 mg total) by mouth 2 (two) times a day As needed    PARoxetine (PAXIL) 40 MG tablet Take 1 tablet (40 mg total) by mouth every morning    [DISCONTINUED] Prenatal Vit-Fe Fumarate-FA (Prenatal Vitamin) 27-0 8 MG TABS Take 1 tablet by mouth daily (Patient not taking: Reported on 8/28/2021)     No current facility-administered medications on file prior to visit  She is allergic to penicillin g and azithromycin       Review of Systems   Skin: Positive for color change  All other systems reviewed and are negative  Objective:      /82   Pulse 84   Temp 97 7 °F (36 5 °C)   Ht 5' 4" (1 626 m)   Wt 96 7 kg (213 lb 1 6 oz)   SpO2 98%   BMI 36 58 kg/m²          Physical Exam  Vitals reviewed  Constitutional:       Appearance: Normal appearance  She is normal weight  HENT:      Head: Normocephalic and atraumatic  Right Ear: Tympanic membrane, ear canal and external ear normal       Left Ear: Tympanic membrane, ear canal and external ear normal       Nose: Nose normal       Mouth/Throat:      Mouth: Mucous membranes are moist       Pharynx: Oropharynx is clear  Eyes:      Extraocular Movements: Extraocular movements intact  Conjunctiva/sclera: Conjunctivae normal       Pupils: Pupils are equal, round, and reactive to light  Cardiovascular:      Rate and Rhythm: Normal rate and regular rhythm  Pulses: Normal pulses  Heart sounds: Normal heart sounds  Pulmonary:      Effort: Pulmonary effort is normal       Breath sounds: Normal breath sounds  Abdominal:      General: Abdomen is flat  Bowel sounds are normal       Palpations: Abdomen is soft  Musculoskeletal:         General: Normal range of motion  Cervical back: Normal range of motion and neck supple  Skin:     General: Skin is warm and dry        Capillary Refill: Capillary refill takes less than 2 seconds  Comments: Ecchymosis to left calf with pain   Neurological:      General: No focal deficit present  Mental Status: She is alert and oriented to person, place, and time  Mental status is at baseline  Psychiatric:         Mood and Affect: Mood normal          Behavior: Behavior normal          Thought Content:  Thought content normal          Judgment: Judgment normal

## 2022-08-24 DIAGNOSIS — F32.A ANXIETY AND DEPRESSION: ICD-10-CM

## 2022-08-24 DIAGNOSIS — F41.9 ANXIETY AND DEPRESSION: ICD-10-CM

## 2022-08-24 DIAGNOSIS — F41.9 ANXIETY: ICD-10-CM

## 2022-08-24 RX ORDER — CLONAZEPAM 0.5 MG/1
0.5 TABLET ORAL 2 TIMES DAILY
Qty: 60 TABLET | Refills: 0 | Status: SHIPPED | OUTPATIENT
Start: 2022-08-24

## 2022-08-24 RX ORDER — PAROXETINE HYDROCHLORIDE 40 MG/1
40 TABLET, FILM COATED ORAL EVERY MORNING
Qty: 30 TABLET | Refills: 0 | Status: SHIPPED | OUTPATIENT
Start: 2022-08-24

## 2022-09-16 ENCOUNTER — APPOINTMENT (OUTPATIENT)
Dept: RADIOLOGY | Facility: CLINIC | Age: 32
End: 2022-09-16
Payer: COMMERCIAL

## 2022-09-16 ENCOUNTER — OFFICE VISIT (OUTPATIENT)
Dept: URGENT CARE | Facility: CLINIC | Age: 32
End: 2022-09-16
Payer: COMMERCIAL

## 2022-09-16 VITALS
RESPIRATION RATE: 18 BRPM | HEART RATE: 72 BPM | DIASTOLIC BLOOD PRESSURE: 72 MMHG | WEIGHT: 209 LBS | SYSTOLIC BLOOD PRESSURE: 128 MMHG | BODY MASS INDEX: 35.68 KG/M2 | HEIGHT: 64 IN | OXYGEN SATURATION: 98 % | TEMPERATURE: 98 F

## 2022-09-16 DIAGNOSIS — Z98.890 HISTORY OF FOOT SURGERY: ICD-10-CM

## 2022-09-16 DIAGNOSIS — S93.602A FOOT SPRAIN, LEFT, INITIAL ENCOUNTER: Primary | ICD-10-CM

## 2022-09-16 DIAGNOSIS — S93.602A FOOT SPRAIN, LEFT, INITIAL ENCOUNTER: ICD-10-CM

## 2022-09-16 PROCEDURE — 73630 X-RAY EXAM OF FOOT: CPT

## 2022-09-16 PROCEDURE — 99213 OFFICE O/P EST LOW 20 MIN: CPT | Performed by: NURSE PRACTITIONER

## 2022-09-16 NOTE — PROGRESS NOTES
[facility]        NAME: Chaz Nuñez is a 36 y o  female  : 1990    MRN: [id number]  DATE: 2022  TIME: 9:47 AM    Assessment and Plan   Foot sprain, left, initial encounter [B19 [id number]]  1  Foot sprain, left, initial encounter  XR foot 3+ vw left   2  History of foot surgery  XR foot 3+ vw left    left           Patient Instructions       Follow up with PCP in 3-5 days  Proceed to  ER if symptoms worsen  Your xray was preliminarily read by your provider  A radiologist will read the xray and you will be notified if it is abnormal     You are to Rest, Ice, compression (ace wrap, splint) elevate  Wear the boot when you are up moving around  You are to see your PCP   Go to the ED if symptoms worsen  Call your orthopedics  Chief Complaint     Chief Complaint   Patient presents with    Foot Pain     Twisted left foot while running 2 days ago          History of Present Illness       This is a 36year old female who states was running on Wednesday and twister her left ankle, inverting it  She states she has had previous surgery with bone shaving, a screw and anchor of tendon and pain is mostly at the medial foot surgical scar  She states she was taking tylenol/motrin as needed for pain  She states she has not called her surgeon  Review of Systems   Review of Systems   Constitutional: Negative  HENT: Negative  Eyes: Negative  Respiratory: Negative  Cardiovascular: Negative  Gastrointestinal: Negative  Endocrine: Negative  Genitourinary: Negative  Musculoskeletal:        Left foot pain    Skin: Negative  Allergic/Immunologic: Negative  Neurological: Negative  Psychiatric/Behavioral: Negative            Current Medications       Current Outpatient Medications:     clonazePAM (KlonoPIN) 0 5 mg tablet, Take 1 tablet (0 5 mg total) by mouth 2 (two) times a day As needed, Disp: 60 tablet, Rfl: 0    PARoxetine (PAXIL) 40 MG tablet, Take 1 tablet (40 mg total) by mouth every morning, Disp: 30 tablet, Rfl: 0    rizatriptan (MAXALT) 10 mg tablet, Take 1 tablet (10 mg total) by mouth as needed for migraine Take at the onset of migraine; if symptoms continue or return, may take another dose at least 2 hours after first dose  Take no more than 2 doses in a day , Disp: 18 tablet, Rfl: 0    Current Allergies     Allergies as of 2022 - Reviewed 2022   Allergen Reaction Noted    Penicillin g Hives and Diarrhea 2016    Azithromycin Rash 2020            The following portions of the patient's history were reviewed and updated as appropriate: allergies, current medications, past family history, past medical history, past social history, past surgical history and problem list      Past Medical History:   Diagnosis Date    Irritable bowel syndrome     last assessed: 10/23/2015    Maternal obesity, antepartum     last assessed: 2017    Migraine        Past Surgical History:   Procedure Laterality Date     SECTION      FOOT SURGERY         Family History   Problem Relation Age of Onset    Irritable bowel syndrome Mother     Ovarian cancer Family     Ovarian cancer Family          Medications have been verified  Objective   /72   Pulse 72   Temp 98 °F (36 7 °C)   Resp 18   Ht 5' 4" (1 626 m)   Wt 94 8 kg (209 lb)   SpO2 98%   BMI 35 87 kg/m²   No LMP recorded  Physical Exam     Physical Exam  Vitals and nursing note reviewed  Constitutional:       General: She is not in acute distress  Appearance: Normal appearance  She is obese  She is not ill-appearing, toxic-appearing or diaphoretic  HENT:      Head: Normocephalic and atraumatic  Eyes:      Extraocular Movements: Extraocular movements intact  Cardiovascular:      Rate and Rhythm: Normal rate  Pulses: Normal pulses     Pulmonary:      Effort: Pulmonary effort is normal    Musculoskeletal:         General: Swelling, tenderness and signs of injury present  No deformity  Normal range of motion  Cervical back: Normal range of motion  Feet:    Skin:     General: Skin is warm and dry  Capillary Refill: Capillary refill takes less than 2 seconds  Neurological:      General: No focal deficit present  Mental Status: She is alert and oriented to person, place, and time  Psychiatric:         Mood and Affect: Mood normal          Behavior: Behavior normal          Thought Content: Thought content normal          Judgment: Judgment normal          Preliminary reading foot xray  Preliminary reading xray  No acute process seen  Waiting on final reading    Orthopedic injury treatment    Date/Time: 9/16/2022 9:45 AM  Performed by: TAHIR Robb  Authorized by: TAHIR Robb     Patient Location:  [facility] Protocol:  Procedure performed by: Dorinda Jaimes RN FNP-S )  Consent: The procedure was performed in an emergent situation  Verbal consent obtained  Written consent obtained  Risks and benefits: risks, benefits and alternatives were discussed  Consent given by: patient  Time out: Immediately prior to procedure a "time out" was called to verify the correct patient, procedure, equipment, support staff and site/side marked as required  Timeout called at: 9/16/2022 9:46 AM   Patient understanding: patient states understanding of the procedure being performed  Patient consent: the patient's understanding of the procedure matches consent given  Procedure consent: procedure consent matches procedure scheduled  Relevant documents: relevant documents present and verified  Test results: test results available and properly labeled  Site marked: the operative site was marked  Radiology Images displayed and confirmed   If images not available, report reviewed: imaging studies available  Required items: required blood products, implants, devices, and special equipment available  Patient identity confirmed: verbally with patient and hospital-assigned identification number      Injury location:  Foot  Location details:  Left foot  Injury type:   Soft tissue  Distal perfusion: normal    Neurological function: normal    Range of motion: normal    Immobilization:  Other (comment) (cam boot)  Neurovascular status: Neurovascularly intact    Distal perfusion: normal    Neurological function: normal    Range of motion comment:  Reduced due to cam boot   Patient tolerance:  Patient tolerated the procedure well with no immediate complications

## 2022-09-16 NOTE — PATIENT INSTRUCTIONS
Your xray was preliminarily read by your provider  A radiologist will read the xray and you will be notified if it is abnormal     You are to Rest, Ice, compression (ace wrap, splint) elevate  Wear the boot when you are up moving around  You are to see your PCP   Go to the ED if symptoms worsen  Call your orthopedics

## 2022-09-16 NOTE — RESULT ENCOUNTER NOTE
Unable to leave message for patient that her foot xray results were negative  She was given preliminary reading in office of negative xray  She has also been instructed to follow up with her orthopedist or podiatrist who did prior surgery

## 2022-10-11 PROBLEM — Z00.00 ROUTINE ADULT HEALTH MAINTENANCE: Status: RESOLVED | Noted: 2022-07-28 | Resolved: 2022-10-11

## 2022-10-11 PROBLEM — Z13.6 SCREENING FOR CARDIOVASCULAR CONDITION: Status: RESOLVED | Noted: 2022-07-28 | Resolved: 2022-10-11

## 2022-11-18 ENCOUNTER — APPOINTMENT (OUTPATIENT)
Dept: RADIOLOGY | Facility: CLINIC | Age: 32
End: 2022-11-18

## 2022-11-18 ENCOUNTER — OFFICE VISIT (OUTPATIENT)
Dept: URGENT CARE | Facility: CLINIC | Age: 32
End: 2022-11-18

## 2022-11-18 VITALS
HEART RATE: 74 BPM | RESPIRATION RATE: 18 BRPM | OXYGEN SATURATION: 99 % | DIASTOLIC BLOOD PRESSURE: 60 MMHG | SYSTOLIC BLOOD PRESSURE: 120 MMHG | TEMPERATURE: 97.5 F

## 2022-11-18 DIAGNOSIS — R05.1 ACUTE COUGH: ICD-10-CM

## 2022-11-18 DIAGNOSIS — R50.81 FEVER IN OTHER DISEASES: ICD-10-CM

## 2022-11-18 DIAGNOSIS — J20.8 ACUTE BRONCHITIS DUE TO OTHER SPECIFIED ORGANISMS: Primary | ICD-10-CM

## 2022-11-18 RX ORDER — PREDNISONE 20 MG/1
60 TABLET ORAL DAILY
Qty: 15 TABLET | Refills: 0 | Status: SHIPPED | OUTPATIENT
Start: 2022-11-18 | End: 2022-11-23

## 2022-11-18 RX ORDER — DOXYCYCLINE HYCLATE 100 MG
100 TABLET ORAL 2 TIMES DAILY
Qty: 14 TABLET | Refills: 0 | Status: SHIPPED | OUTPATIENT
Start: 2022-11-18 | End: 2022-11-25

## 2022-11-18 RX ORDER — ALBUTEROL SULFATE 90 UG/1
2 AEROSOL, METERED RESPIRATORY (INHALATION) EVERY 6 HOURS PRN
Qty: 8.5 G | Refills: 0 | Status: SHIPPED | OUTPATIENT
Start: 2022-11-18

## 2022-11-18 NOTE — PROGRESS NOTES
[facility]        NAME: Candice Murrell is a 36 y o  female  : 1990    MRN: [id number]  DATE: 2022  TIME: 10:42 AM    Assessment and Plan   Acute bronchitis due to other specified organisms [J20 8]  1  Acute bronchitis due to other specified organisms  doxycycline hyclate (VIBRA-TABS) 100 mg tablet    albuterol (ProAir HFA) 90 mcg/act inhaler    predniSONE 20 mg tablet      2  Acute cough  XR chest pa & lateral    doxycycline hyclate (VIBRA-TABS) 100 mg tablet    albuterol (ProAir HFA) 90 mcg/act inhaler    predniSONE 20 mg tablet      3  Fever in other diseases  XR chest pa & lateral    doxycycline hyclate (VIBRA-TABS) 100 mg tablet    albuterol (ProAir HFA) 90 mcg/act inhaler    predniSONE 20 mg tablet            Patient Instructions       Follow up with PCP in 3-5 days  Proceed to  ER if symptoms worsen  Your xray was preliminarily read by your provider  A radiologist will read the xray and you will be notified if it is abnormal     You appear to have bronchitis  You are being prescribed doxycycline  You are to take plain robitussin for cough  Use the inhaler for shortness of breath  Take the steroids as prescribed  Drink water  Take tylenol or motrin for fever or pain  Follow up with your PCP in 3-5 days  Go to the ED if symptoms worsen        Chief Complaint     Chief Complaint   Patient presents with   • Cough     With chest tightness   • Earache     Right ear pain          History of Present Illness       This is a 36year old patient who states started with acute productive green cough, ear pain x 2 days  States had temp of 101  She states she covid tested herself yesterday and it was negative  She states she has chest tightness  Denies SOB, wheezing or hx of asthma  Denies pregnancy  Review of Systems   Review of Systems   Constitutional: Positive for chills and fever  HENT: Positive for congestion and ear pain  Eyes: Negative      Respiratory: Positive for cough and chest tightness  Cardiovascular: Negative  Gastrointestinal: Negative  Endocrine: Negative  Genitourinary: Negative  Musculoskeletal: Negative  Skin: Negative  Allergic/Immunologic: Negative  Neurological: Negative  Hematological: Negative  Psychiatric/Behavioral: Negative  Current Medications       Current Outpatient Medications:   •  albuterol (ProAir HFA) 90 mcg/act inhaler, Inhale 2 puffs every 6 (six) hours as needed for wheezing or shortness of breath, Disp: 8 5 g, Rfl: 0  •  clonazePAM (KlonoPIN) 0 5 mg tablet, Take 1 tablet (0 5 mg total) by mouth 2 (two) times a day As needed, Disp: 60 tablet, Rfl: 0  •  doxycycline hyclate (VIBRA-TABS) 100 mg tablet, Take 1 tablet (100 mg total) by mouth 2 (two) times a day for 7 days, Disp: 14 tablet, Rfl: 0  •  PARoxetine (PAXIL) 40 MG tablet, Take 1 tablet (40 mg total) by mouth every morning, Disp: 30 tablet, Rfl: 0  •  predniSONE 20 mg tablet, Take 3 tablets (60 mg total) by mouth daily for 5 days, Disp: 15 tablet, Rfl: 0  •  rizatriptan (MAXALT) 10 mg tablet, Take 1 tablet (10 mg total) by mouth as needed for migraine Take at the onset of migraine; if symptoms continue or return, may take another dose at least 2 hours after first dose   Take no more than 2 doses in a day , Disp: 18 tablet, Rfl: 0    Current Allergies     Allergies as of 11/18/2022 - Reviewed 11/18/2022   Allergen Reaction Noted   • Penicillin g Hives and Diarrhea 03/09/2016   • Azithromycin Rash 02/26/2020            The following portions of the patient's history were reviewed and updated as appropriate: allergies, current medications, past family history, past medical history, past social history, past surgical history and problem list      Past Medical History:   Diagnosis Date   • Irritable bowel syndrome     last assessed: 10/23/2015   • Maternal obesity, antepartum     last assessed: 08/08/2017   • Migraine        Past Surgical History: Procedure Laterality Date   •  SECTION     • FOOT SURGERY         Family History   Problem Relation Age of Onset   • Irritable bowel syndrome Mother    • Ovarian cancer Family    • Ovarian cancer Family          Medications have been verified  Objective   /60   Pulse 74   Temp 97 5 °F (36 4 °C)   Resp 18   SpO2 99%   No LMP recorded  Physical Exam     Physical Exam  Vitals and nursing note reviewed  Constitutional:       General: She is not in acute distress  Appearance: Normal appearance  She is obese  She is not ill-appearing, toxic-appearing or diaphoretic  HENT:      Head: Normocephalic and atraumatic  Right Ear: Tympanic membrane and ear canal normal       Left Ear: Tympanic membrane and ear canal normal       Nose: Congestion present  No rhinorrhea  Mouth/Throat:      Mouth: Mucous membranes are moist       Pharynx: Oropharynx is clear  No oropharyngeal exudate or posterior oropharyngeal erythema  Eyes:      Extraocular Movements: Extraocular movements intact  Cardiovascular:      Rate and Rhythm: Normal rate and regular rhythm  Pulses: Normal pulses  Heart sounds: Normal heart sounds  No murmur heard  Pulmonary:      Effort: Pulmonary effort is normal       Comments: Decreased breath sounds through out    No wheezes  Mild faint rhonchi   Musculoskeletal:         General: Normal range of motion  Cervical back: Normal range of motion and neck supple  Skin:     General: Skin is warm and dry  Capillary Refill: Capillary refill takes less than 2 seconds  Neurological:      General: No focal deficit present  Mental Status: She is alert and oriented to person, place, and time  Psychiatric:         Mood and Affect: Mood normal          Behavior: Behavior normal          Thought Content:  Thought content normal          Judgment: Judgment normal              Preliminary reading CXR  No acute process seen   Waiting on rad read final

## 2022-11-18 NOTE — PATIENT INSTRUCTIONS
Your xray was preliminarily read by your provider  A radiologist will read the xray and you will be notified if it is abnormal     You appear to have bronchitis  You are being prescribed doxycycline  You are to take plain robitussin for cough  Use the inhaler for shortness of breath  Take the steroids as prescribed  Drink water   Take tylenol or motrin for fever or pain  Follow up with your PCP in 3-5 days  Go to the ED if symptoms worsen

## 2022-11-29 DIAGNOSIS — F41.9 ANXIETY AND DEPRESSION: ICD-10-CM

## 2022-11-29 DIAGNOSIS — F41.9 ANXIETY: ICD-10-CM

## 2022-11-29 DIAGNOSIS — F32.A ANXIETY AND DEPRESSION: ICD-10-CM

## 2022-11-29 RX ORDER — CLONAZEPAM 0.5 MG/1
0.5 TABLET ORAL 2 TIMES DAILY
Qty: 60 TABLET | Refills: 0 | Status: SHIPPED | OUTPATIENT
Start: 2022-11-29

## 2022-11-29 RX ORDER — PAROXETINE HYDROCHLORIDE 40 MG/1
40 TABLET, FILM COATED ORAL EVERY MORNING
Qty: 30 TABLET | Refills: 0 | Status: SHIPPED | OUTPATIENT
Start: 2022-11-29

## 2023-01-04 ENCOUNTER — OFFICE VISIT (OUTPATIENT)
Dept: INTERNAL MEDICINE CLINIC | Facility: CLINIC | Age: 33
End: 2023-01-04

## 2023-01-04 ENCOUNTER — APPOINTMENT (OUTPATIENT)
Dept: LAB | Facility: CLINIC | Age: 33
End: 2023-01-04

## 2023-01-04 VITALS
HEIGHT: 64 IN | SYSTOLIC BLOOD PRESSURE: 122 MMHG | DIASTOLIC BLOOD PRESSURE: 88 MMHG | OXYGEN SATURATION: 99 % | HEART RATE: 82 BPM | WEIGHT: 205 LBS | BODY MASS INDEX: 35 KG/M2 | TEMPERATURE: 96.4 F

## 2023-01-04 DIAGNOSIS — R11.2 NAUSEA AND VOMITING, UNSPECIFIED VOMITING TYPE: ICD-10-CM

## 2023-01-04 DIAGNOSIS — R52 BODY ACHES: ICD-10-CM

## 2023-01-04 DIAGNOSIS — Z13.21 ENCOUNTER FOR VITAMIN DEFICIENCY SCREENING: ICD-10-CM

## 2023-01-04 DIAGNOSIS — R19.7 DIARRHEA, UNSPECIFIED TYPE: ICD-10-CM

## 2023-01-04 DIAGNOSIS — Z23 NEED FOR INFLUENZA VACCINATION: ICD-10-CM

## 2023-01-04 DIAGNOSIS — Z23 NEED FOR VACCINATION: Primary | ICD-10-CM

## 2023-01-04 LAB
25(OH)D3 SERPL-MCNC: 10.6 NG/ML (ref 30–100)
ALBUMIN SERPL BCP-MCNC: 3.5 G/DL (ref 3.5–5)
ALP SERPL-CCNC: 83 U/L (ref 46–116)
ALT SERPL W P-5'-P-CCNC: 33 U/L (ref 12–78)
ANION GAP SERPL CALCULATED.3IONS-SCNC: 6 MMOL/L (ref 4–13)
AST SERPL W P-5'-P-CCNC: 14 U/L (ref 5–45)
BASOPHILS # BLD AUTO: 0.03 THOUSANDS/ÂΜL (ref 0–0.1)
BASOPHILS NFR BLD AUTO: 1 % (ref 0–1)
BILIRUB SERPL-MCNC: 0.22 MG/DL (ref 0.2–1)
BUN SERPL-MCNC: 16 MG/DL (ref 5–25)
CALCIUM SERPL-MCNC: 8.8 MG/DL (ref 8.3–10.1)
CHLORIDE SERPL-SCNC: 109 MMOL/L (ref 96–108)
CO2 SERPL-SCNC: 23 MMOL/L (ref 21–32)
CREAT SERPL-MCNC: 0.84 MG/DL (ref 0.6–1.3)
CRP SERPL QL: 8.3 MG/L
EOSINOPHIL # BLD AUTO: 0.21 THOUSAND/ÂΜL (ref 0–0.61)
EOSINOPHIL NFR BLD AUTO: 4 % (ref 0–6)
ERYTHROCYTE [DISTWIDTH] IN BLOOD BY AUTOMATED COUNT: 11.9 % (ref 11.6–15.1)
ERYTHROCYTE [SEDIMENTATION RATE] IN BLOOD: 38 MM/HOUR (ref 0–19)
GFR SERPL CREATININE-BSD FRML MDRD: 93 ML/MIN/1.73SQ M
GLUCOSE P FAST SERPL-MCNC: 103 MG/DL (ref 65–99)
HCT VFR BLD AUTO: 38.4 % (ref 34.8–46.1)
HGB BLD-MCNC: 12.3 G/DL (ref 11.5–15.4)
IMM GRANULOCYTES # BLD AUTO: 0.01 THOUSAND/UL (ref 0–0.2)
IMM GRANULOCYTES NFR BLD AUTO: 0 % (ref 0–2)
LYMPHOCYTES # BLD AUTO: 2.06 THOUSANDS/ÂΜL (ref 0.6–4.47)
LYMPHOCYTES NFR BLD AUTO: 41 % (ref 14–44)
MCH RBC QN AUTO: 29.6 PG (ref 26.8–34.3)
MCHC RBC AUTO-ENTMCNC: 32 G/DL (ref 31.4–37.4)
MCV RBC AUTO: 92 FL (ref 82–98)
MONOCYTES # BLD AUTO: 0.39 THOUSAND/ÂΜL (ref 0.17–1.22)
MONOCYTES NFR BLD AUTO: 8 % (ref 4–12)
NEUTROPHILS # BLD AUTO: 2.28 THOUSANDS/ÂΜL (ref 1.85–7.62)
NEUTS SEG NFR BLD AUTO: 46 % (ref 43–75)
NRBC BLD AUTO-RTO: 0 /100 WBCS
PLATELET # BLD AUTO: 335 THOUSANDS/UL (ref 149–390)
PMV BLD AUTO: 10.5 FL (ref 8.9–12.7)
POTASSIUM SERPL-SCNC: 3.7 MMOL/L (ref 3.5–5.3)
PROT SERPL-MCNC: 7.6 G/DL (ref 6.4–8.4)
RBC # BLD AUTO: 4.16 MILLION/UL (ref 3.81–5.12)
SODIUM SERPL-SCNC: 138 MMOL/L (ref 135–147)
TSH SERPL DL<=0.05 MIU/L-ACNC: 1.73 UIU/ML (ref 0.45–4.5)
VIT B12 SERPL-MCNC: 355 PG/ML (ref 100–900)
WBC # BLD AUTO: 4.98 THOUSAND/UL (ref 4.31–10.16)

## 2023-01-04 NOTE — PATIENT INSTRUCTIONS
Low Fat Diet   AMBULATORY CARE:   A low-fat diet  is an eating plan that is low in total fat, unhealthy fat, and cholesterol  You may need to follow a low-fat diet if you have trouble digesting or absorbing fat  You may also need to follow this diet if you have high cholesterol  You can also lower your cholesterol by increasing the amount of fiber in your diet  Soluble fiber is a type of fiber that helps to decrease cholesterol levels  Different types of fat in food:   · Limit unhealthy fats  A diet that is high in cholesterol, saturated fat, and trans fat may cause unhealthy cholesterol levels  Unhealthy cholesterol levels increase your risk of heart disease  ? Cholesterol:  Limit intake of cholesterol to less than 200 mg per day  Cholesterol is found in meat, eggs, and dairy  ? Saturated fat:  Limit saturated fat to less than 7% of your total daily calories  Ask your dietitian how many calories you need each day  Saturated fat is found in butter, cheese, ice cream, whole milk, and palm oil  Saturated fat is also found in meat, such as beef, pork, chicken skin, and processed meats  Processed meats include sausage, hot dogs, and bologna  ? Trans fat:  Avoid trans fat as much as possible  Trans fat is used in fried and baked foods  Foods that say trans fat free on the label may still have up to 0 5 grams of trans fat per serving  · Include healthy fats  Replace foods that are high in saturated and trans fat with foods high in healthy fats  This may help to decrease high cholesterol levels  ? Monounsaturated fats: These are found in avocados, nuts, and vegetable oils, such as olive, canola, and sunflower oil  ? Polyunsaturated fats: These can be found in vegetable oils, such as soybean or corn oil  Omega-3 fats can help to decrease the risk of heart disease  Omega-3 fats are found in fish, such as salmon, herring, trout, and tuna   Omega-3 fats can also be found in plant foods, such as walnuts, flaxseed, soybeans, and canola oil  Foods to limit or avoid:   · Grains:      ? Snacks that are made with partially hydrogenated oils, such as chips, regular crackers, and butter-flavored popcorn    ? High-fat baked goods, such as biscuits, croissants, doughnuts, pies, cookies, and pastries    · Dairy:      ? Whole milk, 2% milk, and yogurt and ice cream made with whole milk    ? Half and half creamer, heavy cream, and whipping cream    ? Cheese, cream cheese, and sour cream    · Meats and proteins:      ? High-fat cuts of meat (T-bone steak, regular hamburger, and ribs)    ? Fried meat, poultry (turkey and chicken), and fish    ? Poultry (chicken and turkey) with skin    ? Cold cuts (salami or bologna), hot dogs, roper, and sausage    ? Whole eggs and egg yolks    · Vegetables and fruits with added fat:      ? Fried vegetables or vegetables in butter or high-fat sauces, such as cream or cheese sauces    ? Fried fruit or fruit served with butter or cream    · Fats:      ? Butter, stick margarine, and shortening    ? Coconut, palm oil, and palm kernel oil    Foods to include:   · Grains:      ? Whole-grain breads, cereals, pasta, and brown rice    ? Low-fat crackers and pretzels    · Vegetables and fruits:      ? Fresh, frozen, or canned vegetables (no salt or low-sodium)    ? Fresh, frozen, dried, or canned fruit (canned in light syrup or fruit juice)    ? Avocado    · Low-fat dairy products:      ? Nonfat (skim) or 1% milk    ? Nonfat or low-fat cheese, yogurt, and cottage cheese    · Meats and proteins:      ? Chicken or turkey with no skin    ? Baked or broiled fish    ? Lean beef and pork (loin, round, extra lean hamburger)    ? Beans and peas, unsalted nuts, soy products    ? Egg whites and substitutes    ? Seeds and nuts    · Fats:      ? Unsaturated oil, such as canola, olive, peanut, soybean, or sunflower oil    ? Soft or liquid margarine and vegetable oil spread    ?  Low-fat salad dressing    Other ways to decrease fat:   · Read food labels before you buy foods  Choose foods that have less than 30% of calories from fat  Choose low-fat or fat-free dairy products  Remember that fat free does not mean calorie free  These foods still contain calories, and too many calories can lead to weight gain  · Trim fat from meat and avoid fried food  Trim all visible fat from meat before you cook it  Remove the skin from poultry  Do not [place] meat, fish, or poultry  Bake, roast, boil, or broil these foods instead  Avoid fried foods  Eat a baked potato instead of [place] fries  Steam vegetables instead of sautéing them in butter  · Add less fat to foods  Use imitation roper bits on salads and baked potatoes instead of regular roper bits  Use fat-free or low-fat salad dressings instead of regular dressings  Use low-fat or nonfat butter-flavored topping instead of regular butter or margarine on popcorn and other foods  Ways to decrease fat in recipes:  Replace high-fat ingredients with low-fat or nonfat ones  This may cause baked goods to be drier than usual  You may need to use nonfat cooking spray on pans to prevent food from sticking  You also may need to change the amount of other ingredients, such as water, in the recipe  Try the following:  · Use low-fat or light margarine instead of regular margarine or shortening  · Use lean ground turkey breast or chicken, or lean ground beef (less than 5% fat) instead of hamburger  · Add 1 teaspoon of canola oil to 8 ounces of skim milk instead of using cream or half and half  · Use grated zucchini, carrots, or apples in breads instead of coconut  · Use blenderized, low-fat cottage cheese, plain tofu, or low-fat ricotta cheese instead of cream cheese  · Use 1 egg white and 1 teaspoon of canola oil, or use ¼ cup (2 ounces) of fat-free egg substitute instead of a whole egg       · Replace half of the oil that is called for in a recipe with applesauce when you bake  Use 3 tablespoons of cocoa powder and 1 tablespoon of canola oil instead of a square of baking chocolate  How to increase fiber:  Eat enough high-fiber foods to get 20 to 30 grams of fiber every day  Slowly increase your fiber intake to avoid stomach cramps, gas, and other problems  · Eat 3 ounces of whole-grain foods each day  An ounce is about 1 slice of bread  Eat whole-grain breads, such as whole-wheat bread  Whole wheat, whole-wheat flour, or other whole grains should be listed as the first ingredient on the food label  Replace white flour with whole-grain flour or use half of each in recipes  Whole-grain flour is heavier than white flour, so you may have to add more yeast or baking powder  · Eat a high-fiber cereal for breakfast   Oatmeal is a good source of soluble fiber  Look for cereals that have bran or fiber in the name  Choose whole-grain products, such as brown rice, barley, and whole-wheat pasta  · Eat more beans, peas, and lentils  For example, add beans to soups or salads  Eat at least 5 cups of fruits and vegetables each day  Eat fruits and vegetables with the peel because the peel is high in fiber  © Copyright [employer] 2022 Information is for End User's use only and may not be sold, redistributed or otherwise used for commercial purposes  All illustrations and images included in CareNotes® are the copyrighted property of [employer] D A M , Inc  or [address] [place], [place] [address]isabela   The above information is an  only  It is not intended as medical advice for individual conditions or treatments  Talk to your doctor, nurse or pharmacist before following any medical regimen to see if it is safe and effective for you  Heart Healthy Diet   AMBULATORY CARE:   A heart healthy diet  is an eating plan low in unhealthy fats and sodium (salt)  The plan is high in healthy fats and fiber   A heart healthy diet helps improve your cholesterol levels and lowers your risk for heart disease and stroke  A dietitian will teach you how to read and understand food labels  Heart healthy diet guidelines to follow:   · Choose foods that contain healthy fats  ? Unsaturated fats  include monounsaturated and polyunsaturated fats  Unsaturated fat is found in foods such as soybean, canola, olive, corn, and safflower oils  It is also found in soft tub margarine that is made with liquid vegetable oil  ? Omega-3 fat  is found in certain fish, such as salmon, tuna, and trout, and in walnuts and flaxseed  Eat fish high in omega-3 fats at least 2 times a week  · Get 20 to 30 grams of fiber each day  Fruits, vegetables, whole-grain foods, and legumes (cooked beans) are good sources of fiber  · Limit or do not have unhealthy fats  ? Cholesterol  is found in animal foods, such as eggs and lobster, and in dairy products made from whole milk  Limit cholesterol to less than 200 mg each day  ? Saturated fat  is found in meats, such as roper and hamburger  It is also found in chicken or turkey skin, whole milk, and butter  Limit saturated fat to less than 7% of your total daily calories  ? Trans fat  is found in packaged foods, such as potato chips and cookies  It is also in hard margarine, some fried foods, and shortening  Do not eat foods that contain trans fats  · Limit sodium as directed  You may be told to limit sodium to 2,000 to 2,300 mg each day  Choose low-sodium or no-salt-added foods  Add little or no salt to food you prepare  Use herbs and spices in place of salt  Include the following in your heart healthy plan:  Ask your dietitian or healthcare provider how many servings to have from each of the following food groups:  · Grains:      ? Whole-wheat breads, cereals, and pastas, and brown rice    ? Low-fat, low-sodium crackers and chips    · Vegetables:      ? Broccoli, green beans, green peas, and spinach    ? Collards, kale, and lima beans    ?  Carrots, sweet potatoes, tomatoes, and peppers    ? Canned vegetables with no salt added    · Fruits:      ? Bananas, peaches, pears, and pineapple    ? Grapes, raisins, and dates    ? Oranges, tangerines, grapefruit, orange juice, and grapefruit juice    ? Apricots, mangoes, melons, and papaya    ? Raspberries and strawberries    ? Canned fruit with no added sugar    · Low-fat dairy:      ? Nonfat (skim) milk, 1% milk, and low-fat almond, cashew, or soy milks fortified with calcium    ? Low-fat cheese, regular or frozen yogurt, and cottage cheese    · Meats and proteins:      ? Lean cuts of beef and pork (loin, leg, round), skinless chicken and turkey    ? Legumes, soy products, egg whites, or nuts    Limit or do not include the following in your heart healthy plan:   · Unhealthy fats and oils:      ? Whole or 2% milk, cream cheese, sour cream, or cheese    ? High-fat cuts of beef (T-bone steaks, ribs), chicken or turkey with skin, and organ meats such as liver    ? Butter, stick margarine, shortening, and cooking oils such as coconut or palm oil    · Foods and liquids high in sodium:      ? Packaged foods, such as frozen dinners, cookies, macaroni and cheese, and cereals with more than 300 mg of sodium per serving    ? Vegetables with added sodium, such as instant potatoes, vegetables with added sauces, or regular canned vegetables    ? Cured or smoked meats, such as hot dogs, roper, and sausage    ? High-sodium ketchup, barbecue sauce, salad dressing, pickles, olives, soy sauce, or miso    · Foods and liquids high in sugar:      ? Candy, cake, cookies, pies, or doughnuts    ? Soft drinks (soda), sports drinks, or sweetened tea    ? Canned or dry mixes for cakes, soups, sauces, or gravies    Other healthy heart guidelines:   · Do not smoke  Nicotine and other chemicals in cigarettes and cigars can cause lung and heart damage  Ask your healthcare provider for information if you currently smoke and need help to quit  E-cigarettes or smokeless tobacco still contain nicotine  Talk to your healthcare provider before you use these products  · Limit or do not drink alcohol as directed  Alcohol can damage your heart and raise your blood pressure  Your healthcare provider may give you specific daily and weekly limits  The general recommended limit is 1 drink a day for women 21 or older and for men 72 or older  Do not have more than 3 drinks in a day or 7 in a week  The recommended limit is 2 drinks a day for men 24to 59years of age  Do not have more than 4 drinks in a day or 14 in a week  A drink of alcohol is 12 ounces of beer, 5 ounces of wine, or 1½ ounces of liquor  · Exercise regularly  Exercise can help you maintain a healthy weight and improve your blood pressure and cholesterol levels  Regular exercise can also decrease your risk for heart problems  Ask your healthcare provider about the best exercise plan for you  Do not start an exercise program without asking your healthcare provider  Follow up with your doctor or cardiologist as directed:  Write down your questions so you remember to ask them during your visits  © Copyright [employer] 2022 Information is for End User's use only and may not be sold, redistributed or otherwise used for commercial purposes  All illustrations and images included in CareNotes® are the copyrighted property of [employer] Inc  or [facility] [place][note 2] Erick Landin   The above information is an  only  It is not intended as medical advice for individual conditions or treatments  Talk to your doctor, nurse or pharmacist before following any medical regimen to see if it is safe and effective for you

## 2023-01-04 NOTE — PROGRESS NOTES
Name: Donovan Devlin      : 1990      MRN: [id number]  Encounter Provider: TAHIR Garcia  Encounter Date: 2023   Encounter department: [address] [place] [address] W  Will repeat labs and add on inflammatory markers  Did advise about following back up with GI  Will notify once labs are back  Will give Flu vaccine  1  Need for vaccination  -     influenza vaccine, quadrivalent, 0 5 mL, preservative-free, for adult and pediatric patients 6 mos+ (AFLURIA, FLUARIX, FLULAVAL, FLUZONE)    2  Nausea and vomiting, unspecified vomiting type  -     Comprehensive metabolic panel; Future  -     CBC and differential; Future  -     TSH, 3rd generation with Free T4 reflex; Future    3  Diarrhea, unspecified type  -     Comprehensive metabolic panel; Future  -     CBC and differential; Future  -     TSH, 3rd generation with Free T4 reflex; Future    4  Body aches  -     Comprehensive metabolic panel; Future  -     CBC and differential; Future  -     TSH, 3rd generation with Free T4 reflex; Future  -     BEBE Screen w/ Reflex to Titer/Pattern; Future  -     C-reactive protein; Future  -     Sedimentation rate, automated; Future    5  BMI 35 0-35 9,adult  -     Comprehensive metabolic panel; Future  -     CBC and differential; Future  -     TSH, 3rd generation with Free T4 reflex; Future    6  Encounter for vitamin deficiency screening  -     Vitamin D 25 hydroxy; Future  -     Vitamin B12; Future    7  Need for influenza vaccination           Mitzy Fernandez is for an acute visit  Since November she has not been feeling well  She has bronchitis and was better but has been having on and off nausea and vomiting with body aches  She has no fever and has had numerous covid tests which were negative  She is just not sure what else to do  She has not seen GI in some time  She offers no other issues      Review of Systems   Gastrointestinal: Positive for diarrhea, nausea and vomiting  All other systems reviewed and are negative  Current Outpatient Medications on File Prior to Visit   Medication Sig   • albuterol (ProAir HFA) 90 mcg/act inhaler Inhale 2 puffs every 6 (six) hours as needed for wheezing or shortness of breath   • clonazePAM (KlonoPIN) 0 5 mg tablet Take 1 tablet (0 5 mg total) by mouth 2 (two) times a day As needed   • PARoxetine (PAXIL) 40 MG tablet Take 1 tablet (40 mg total) by mouth every morning   • rizatriptan (MAXALT) 10 mg tablet Take 1 tablet (10 mg total) by mouth as needed for migraine Take at the onset of migraine; if symptoms continue or return, may take another dose at least 2 hours after first dose  Take no more than 2 doses in a day  Objective     /88   Pulse 82   Temp (!) 96 4 °F (35 8 °C) (Temporal)   Ht 5' 4" (1 626 m)   Wt 93 kg (205 lb)   SpO2 99%   BMI 35 19 kg/m²     Physical Exam  Vitals reviewed  Constitutional:       Appearance: Normal appearance  She is normal weight  HENT:      Head: Normocephalic and atraumatic  Right Ear: Tympanic membrane, ear canal and external ear normal       Left Ear: Tympanic membrane, ear canal and external ear normal       Nose: Nose normal       Mouth/Throat:      Mouth: Mucous membranes are moist       Pharynx: Oropharynx is clear  Eyes:      Extraocular Movements: Extraocular movements intact  Conjunctiva/sclera: Conjunctivae normal       Pupils: Pupils are equal, round, and reactive to light  Cardiovascular:      Rate and Rhythm: Normal rate and regular rhythm  Pulses: Normal pulses  Heart sounds: Normal heart sounds  Pulmonary:      Effort: Pulmonary effort is normal       Breath sounds: Normal breath sounds  Abdominal:      General: Abdomen is flat  Bowel sounds are normal       Palpations: Abdomen is soft  Musculoskeletal:         General: Normal range of motion  Cervical back: Normal range of motion and neck supple     Skin:     General: Skin is warm and dry  Capillary Refill: Capillary refill takes less than 2 seconds  Neurological:      General: No focal deficit present  Mental Status: She is alert and oriented to person, place, and time  Mental status is at baseline  Psychiatric:         Mood and Affect: Mood normal          Behavior: Behavior normal          Thought Content: Thought content normal          Judgment: Judgment normal        TAHIR Sullivan  BMI Counseling: Body mass index is 35 19 kg/m²  The BMI is above normal  Nutrition recommendations include reducing portion sizes, decreasing overall calorie intake, 3-5 servings of fruits/vegetables daily, reducing fast food intake, consuming healthier snacks, decreasing soda and/or juice intake, moderation in carbohydrate intake, increasing intake of lean protein, reducing intake of saturated fat and trans fat and reducing intake of cholesterol

## 2023-01-05 DIAGNOSIS — E55.9 VITAMIN D DEFICIENCY: Primary | ICD-10-CM

## 2023-01-05 LAB — ANA SER QL IA: NEGATIVE

## 2023-01-05 RX ORDER — ERGOCALCIFEROL 1.25 MG/1
50000 CAPSULE ORAL WEEKLY
Qty: 4 CAPSULE | Refills: 3 | Status: SHIPPED | OUTPATIENT
Start: 2023-01-05

## 2023-01-31 DIAGNOSIS — F41.9 ANXIETY AND DEPRESSION: ICD-10-CM

## 2023-01-31 DIAGNOSIS — F32.A ANXIETY AND DEPRESSION: ICD-10-CM

## 2023-01-31 DIAGNOSIS — F41.9 ANXIETY: ICD-10-CM

## 2023-02-01 RX ORDER — PAROXETINE HYDROCHLORIDE 40 MG/1
40 TABLET, FILM COATED ORAL EVERY MORNING
Qty: 30 TABLET | Refills: 0 | Status: SHIPPED | OUTPATIENT
Start: 2023-02-01

## 2023-02-01 RX ORDER — CLONAZEPAM 0.5 MG/1
0.5 TABLET ORAL 2 TIMES DAILY
Qty: 60 TABLET | Refills: 0 | Status: SHIPPED | OUTPATIENT
Start: 2023-02-01

## 2023-02-07 ENCOUNTER — OFFICE VISIT (OUTPATIENT)
Dept: URGENT CARE | Facility: CLINIC | Age: 33
End: 2023-02-07

## 2023-02-07 VITALS
BODY MASS INDEX: 35.19 KG/M2 | HEART RATE: 88 BPM | WEIGHT: 205 LBS | RESPIRATION RATE: 18 BRPM | OXYGEN SATURATION: 97 % | TEMPERATURE: 98.4 F

## 2023-02-07 DIAGNOSIS — J22 ACUTE RESPIRATORY INFECTION: ICD-10-CM

## 2023-02-07 DIAGNOSIS — J02.0 STREP PHARYNGITIS: Primary | ICD-10-CM

## 2023-02-07 DIAGNOSIS — J02.9 SORE THROAT: ICD-10-CM

## 2023-02-07 DIAGNOSIS — R50.9 FEVER, UNSPECIFIED FEVER CAUSE: ICD-10-CM

## 2023-02-07 DIAGNOSIS — R05.1 ACUTE COUGH: ICD-10-CM

## 2023-02-07 LAB — S PYO AG THROAT QL: POSITIVE

## 2023-02-07 RX ORDER — CEPHALEXIN 500 MG/1
500 CAPSULE ORAL EVERY 12 HOURS SCHEDULED
Qty: 20 CAPSULE | Refills: 0 | Status: SHIPPED | OUTPATIENT
Start: 2023-02-07 | End: 2023-02-17

## 2023-02-07 NOTE — PROGRESS NOTES
[facility]        NAME: Timoteo Maldonado is a 29 y o  female  : 1990    MRN: [id number]  DATE: 2023  TIME: 2:33 PM    Assessment and Plan   Strep pharyngitis [J02 0]  1  Strep pharyngitis  cephalexin (KEFLEX) 500 mg capsule      2  Acute respiratory infection  cephalexin (KEFLEX) 500 mg capsule      3  Sore throat  POCT rapid strepA    Cov/Flu-Collected at [facility] or [facility]    cephalexin (KEFLEX) 500 mg capsule      4  Fever, unspecified fever cause  POCT rapid strepA    Cov/Flu-Collected at [facility] or [facility]      5  Acute cough  Cov/Flu-Collected at [facility] or [facility]            Patient Instructions       Follow up with PCP in 3-5 days  Proceed to  ER if symptoms worsen  You have been prescribe cephalexin for strep throat  You stated you have taken this before  You are to take a probiotic or eat yogurt to prevent GI upset  You may take OTC products for cold, cough and congestion relief  You are to do warm salt water gargles 4 x daily  Drink warm tea with honey and lemon  Take tylenol or motrin as able for pain or fever  Chloraseptic throat spray, cough drops  Do not share utensils  Change your tooth brush in 3 days  Follow up with your PCP in 2-3 days  Go to the ED if symptoms worsen     You appear to have covid/symptoms  You have a covid test pending  You are to download  [facility] for the results in 24-48 hours  You will be notified if results are +  You are to take vitamin C, D, robitussin for cough  Do not take cough suppressants; you want to take an expectorant  Sleep on your stomach  You are to quarantine as required per CDC guidelines  Mask x 10 days if positive  Mask as long as you have symptoms  See your PCP for follow up in 2-3 days  Go to the ED if symptoms worsen or are severe  You are to call your PCP if your results are + to discuss Paxlovid treatment       You have flu like symptoms    You are to rest  Drink gatorade or pedialyte for rehydration  You are to eat a BRAT diet - bananas, rice, applesauce and toast   You may try imodium for diarrhea  Take tylenol or motrin for fever or pain  You are to download  mychart for the results in 24-48 hours  You will be notified if the results are positive  You are to mask x 10 days and mask if still symptomatic after the 10 days  Follow up with your PCP in 2-3 days    Chief Complaint     Chief Complaint   Patient presents with   • Sore Throat   • Earache   • Cold Like Symptoms   • Cough   • Fever         History of Present Illness       This is a 29year old female who states developed head cold symptoms, bilateral ear pain, body aches, sorethroat and temp of 101 2 days ago  She has only been taking tylenol and motrin for pain/fever  She denies pregnancy  She states she works at the [employer]  Review of Systems   Review of Systems   Constitutional: Positive for chills, fatigue and fever  HENT: Positive for congestion, ear pain and sore throat  Eyes: Negative  Respiratory: Negative for cough  Cardiovascular: Negative  Gastrointestinal: Negative  Endocrine: Negative  Genitourinary: Negative  Musculoskeletal: Positive for myalgias  Skin: Negative  Allergic/Immunologic: Negative  Neurological: Negative  Hematological: Negative  Psychiatric/Behavioral: Negative            Current Medications       Current Outpatient Medications:   •  cephalexin (KEFLEX) 500 mg capsule, Take 1 capsule (500 mg total) by mouth every 12 (twelve) hours for 10 days, Disp: 20 capsule, Rfl: 0  •  albuterol (ProAir HFA) 90 mcg/act inhaler, Inhale 2 puffs every 6 (six) hours as needed for wheezing or shortness of breath, Disp: 8 5 g, Rfl: 0  •  clonazePAM (KlonoPIN) 0 5 mg tablet, Take 1 tablet (0 5 mg total) by mouth 2 (two) times a day As needed, Disp: 60 tablet, Rfl: 0  •  ergocalciferol (VITAMIN D2) 50,000 units, Take 1 capsule (50,000 Units total) by mouth once a week, Disp: 4 capsule, Rfl: 3  •  PARoxetine (PAXIL) 40 MG tablet, Take 1 tablet (40 mg total) by mouth every morning, Disp: 30 tablet, Rfl: 0  •  rizatriptan (MAXALT) 10 mg tablet, Take 1 tablet (10 mg total) by mouth as needed for migraine Take at the onset of migraine; if symptoms continue or return, may take another dose at least 2 hours after first dose  Take no more than 2 doses in a day , Disp: 18 tablet, Rfl: 0    Current Allergies     Allergies as of 2023 - Reviewed 2023   Allergen Reaction Noted   • Penicillin g Hives and Diarrhea 2016   • Azithromycin Rash 2020            The following portions of the patient's history were reviewed and updated as appropriate: allergies, current medications, past family history, past medical history, past social history, past surgical history and problem list      Past Medical History:   Diagnosis Date   • Irritable bowel syndrome     last assessed: 10/23/2015   • Maternal obesity, antepartum     last assessed: 2017   • Migraine        Past Surgical History:   Procedure Laterality Date   •  SECTION     • FOOT SURGERY         Family History   Problem Relation Age of Onset   • Irritable bowel syndrome Mother    • Ovarian cancer Family    • Ovarian cancer Family          Medications have been verified  Objective   Pulse 88   Temp 98 4 °F (36 9 °C)   Resp 18   Wt 93 kg (205 lb)   SpO2 97%   BMI 35 19 kg/m²   No LMP recorded  Physical Exam     Physical Exam  Vitals and nursing note reviewed  Constitutional:       General: She is not in acute distress  Appearance: She is well-developed  She is obese  She is not ill-appearing, toxic-appearing or diaphoretic  HENT:      Head: Normocephalic and atraumatic  Right Ear: A middle ear effusion is present  Left Ear: A middle ear effusion is present  Nose: Congestion and rhinorrhea present        Mouth/Throat:      Mouth: Mucous membranes are moist  No oral lesions  Pharynx: Uvula midline  No pharyngeal swelling, oropharyngeal exudate, posterior oropharyngeal erythema or uvula swelling  Tonsils: No tonsillar exudate or tonsillar abscesses  1+ on the right  1+ on the left  Eyes:      Extraocular Movements:      Right eye: Normal extraocular motion  Left eye: Normal extraocular motion  Cardiovascular:      Rate and Rhythm: Normal rate and regular rhythm  Heart sounds: Normal heart sounds  No murmur heard  Pulmonary:      Effort: Pulmonary effort is normal       Breath sounds: Normal breath sounds  Musculoskeletal:      Cervical back: Normal range of motion and neck supple  Lymphadenopathy:      Cervical: No cervical adenopathy  Skin:     General: Skin is warm and dry  Neurological:      General: No focal deficit present  Mental Status: She is alert and oriented to person, place, and time     Psychiatric:         Mood and Affect: Mood normal          Behavior: Behavior normal

## 2023-02-07 NOTE — PATIENT INSTRUCTIONS
You have been prescribe cephalexin for strep throat  You stated you have taken this before  You are to take a probiotic or eat yogurt to prevent GI upset  You may take OTC products for cold, cough and congestion relief  You are to do warm salt water gargles 4 x daily  Drink warm tea with honey and lemon  Take tylenol or motrin as able for pain or fever  Chloraseptic throat spray, cough drops  Do not share utensils  Change your tooth brush in 3 days  Follow up with your PCP in 2-3 days  Go to the ED if symptoms worsen     You appear to have covid/symptoms  You have a covid test pending  You are to download [facility] for the results in 24-48 hours  You will be notified if results are +  You are to take vitamin C, D, robitussin for cough  Do not take cough suppressants; you want to take an expectorant  Sleep on your stomach  You are to quarantine as required per CDC guidelines  Mask x 10 days if positive  Mask as long as you have symptoms  See your PCP for follow up in 2-3 days  Go to the ED if symptoms worsen or are severe  You are to call your PCP if your results are + to discuss Paxlovid treatment       You have flu like symptoms  You are to rest  Drink gatorade or pedialyte for rehydration  You are to eat a BRAT diet - bananas, rice, applesauce and toast   You may try imodium for diarrhea  Take tylenol or motrin for fever or pain  You are to download [facility] for the results in 24-48 hours  You will be notified if the results are positive  You are to mask x 10 days and mask if still symptomatic after the 10 days  Follow up with your PCP in 2-3 days  Go to the ED if symptoms worsen

## 2023-02-07 NOTE — LETTER
February 7, 2023     Patient: Maria Elena Davis   YOB: 1990   Date of Visit: 2/7/2023       To Whom It May Concern: It is my medical opinion that Marcelo Isabel may return to work on 2/9/2023 with no restrictions   If you have any questions or concerns, please don't hesitate to call           Sincerely,        TAHIR Stanford    CC: No Recipients

## 2023-02-21 ENCOUNTER — VBI (OUTPATIENT)
Dept: ADMINISTRATIVE | Facility: OTHER | Age: 33
End: 2023-02-21

## 2023-03-06 ENCOUNTER — OFFICE VISIT (OUTPATIENT)
Dept: INTERNAL MEDICINE CLINIC | Facility: CLINIC | Age: 33
End: 2023-03-06

## 2023-03-06 VITALS
TEMPERATURE: 98 F | SYSTOLIC BLOOD PRESSURE: 142 MMHG | BODY MASS INDEX: 35.61 KG/M2 | DIASTOLIC BLOOD PRESSURE: 100 MMHG | HEART RATE: 75 BPM | WEIGHT: 208.6 LBS | OXYGEN SATURATION: 98 % | HEIGHT: 64 IN

## 2023-03-06 DIAGNOSIS — Z56.6 STRESS AT WORK: ICD-10-CM

## 2023-03-06 DIAGNOSIS — F32.A ANXIETY AND DEPRESSION: Primary | ICD-10-CM

## 2023-03-06 DIAGNOSIS — F41.9 ANXIETY AND DEPRESSION: Primary | ICD-10-CM

## 2023-03-06 PROBLEM — R58 ECCHYMOSIS: Status: RESOLVED | Noted: 2022-07-28 | Resolved: 2023-03-06

## 2023-03-06 PROBLEM — R19.7 DIARRHEA: Status: RESOLVED | Noted: 2023-01-04 | Resolved: 2023-03-06

## 2023-03-06 PROBLEM — R11.2 NAUSEA AND VOMITING: Status: RESOLVED | Noted: 2023-01-04 | Resolved: 2023-03-06

## 2023-03-06 SDOH — HEALTH STABILITY - MENTAL HEALTH: OTHER PHYSICAL AND MENTAL STRAIN RELATED TO WORK: Z56.6

## 2023-03-06 NOTE — PROGRESS NOTES
Name: Delle Felty      : 1990      MRN: [id number]  Encounter Provider: TAHIR Rodriguez  Encounter Date: 3/6/2023   Encounter department: [address] [place] [address], [address]  Will give note to have off of work for week  Continues Klonopin and Paxil  BP up on exam will monitor at home and call if any high readings  If any chest pain did advise to go to ER  1  Anxiety and depression    2  Stress at work           Subjective      Rebecca is for an acute visit  She is having a lot of anxiety and stress at work  She is taking Paxil and Klonopin and this does help but right now her job is so bad she is looking else where  She does work at the [employer]  She was having some chest pain but feels this is related to stress  She is checking her BP at work and it has been elevated  She offers no other issues  Review of Systems   All other systems reviewed and are negative  Current Outpatient Medications on File Prior to Visit   Medication Sig   • albuterol (ProAir HFA) 90 mcg/act inhaler Inhale 2 puffs every 6 (six) hours as needed for wheezing or shortness of breath   • clonazePAM (KlonoPIN) 0 5 mg tablet Take 1 tablet (0 5 mg total) by mouth 2 (two) times a day As needed   • ergocalciferol (VITAMIN D2) 50,000 units Take 1 capsule (50,000 Units total) by mouth once a week   • PARoxetine (PAXIL) 40 MG tablet Take 1 tablet (40 mg total) by mouth every morning   • rizatriptan (MAXALT) 10 mg tablet Take 1 tablet (10 mg total) by mouth as needed for migraine Take at the onset of migraine; if symptoms continue or return, may take another dose at least 2 hours after first dose  Take no more than 2 doses in a day  Objective     Pulse 75   Temp 98 °F (36 7 °C) (Temporal)   Ht 5' 4" (1 626 m)   Wt 94 6 kg (208 lb 9 6 oz)   SpO2 98%   BMI 35 81 kg/m²     Physical Exam  Vitals reviewed  Constitutional:       Appearance: Normal appearance  She is normal weight     Cardiovascular: Rate and Rhythm: Regular rhythm  Tachycardia present  Pulses: Normal pulses  Heart sounds: Normal heart sounds  Pulmonary:      Breath sounds: Normal breath sounds  Musculoskeletal:         General: Normal range of motion  Skin:     General: Skin is warm and dry  Capillary Refill: Capillary refill takes less than 2 seconds  Neurological:      General: No focal deficit present  Mental Status: She is alert and oriented to person, place, and time  Mental status is at baseline  Psychiatric:         Mood and Affect: Mood normal          Behavior: Behavior normal          Thought Content:  Thought content normal          Judgment: Judgment normal        TAHIR Salgado

## 2023-03-06 NOTE — LETTER
March 6, 2023     Patient: Lesley Funez  YOB: 1990  Date of Visit: 3/6/2023      To Whom it May Concern:    Macrina Alvarenga is under my professional care  Rebecca was seen in my office on 3/6/2023  Please excuse Quintin Gottron from work 03/06/2023 until 03/12/2023  May return on 03/13/2023  If you have any questions or concerns, please don't hesitate to call  Sincerely,          TAHIR Salgado        CC: Rebecca Lee

## 2023-03-15 ENCOUNTER — APPOINTMENT (EMERGENCY)
Dept: RADIOLOGY | Facility: HOSPITAL | Age: 33
End: 2023-03-15

## 2023-03-15 ENCOUNTER — HOSPITAL ENCOUNTER (EMERGENCY)
Facility: HOSPITAL | Age: 33
Discharge: HOME | End: 2023-03-15
Attending: FAMILY MEDICINE | Admitting: FAMILY MEDICINE

## 2023-03-15 ENCOUNTER — APPOINTMENT (EMERGENCY)
Dept: CT IMAGING | Facility: HOSPITAL | Age: 33
End: 2023-03-15

## 2023-03-15 VITALS
TEMPERATURE: 97.1 F | OXYGEN SATURATION: 98 % | SYSTOLIC BLOOD PRESSURE: 118 MMHG | HEART RATE: 83 BPM | RESPIRATION RATE: 20 BRPM | DIASTOLIC BLOOD PRESSURE: 61 MMHG

## 2023-03-15 DIAGNOSIS — R07.89 NON-CARDIAC CHEST PAIN: ICD-10-CM

## 2023-03-15 DIAGNOSIS — F41.9 ANXIETY: Primary | ICD-10-CM

## 2023-03-15 DIAGNOSIS — M54.9 BACK PAIN: ICD-10-CM

## 2023-03-15 LAB
ANION GAP SERPL CALCULATED.3IONS-SCNC: 8 MMOL/L (ref 4–13)
ATRIAL RATE: 84 BPM
BACTERIA UR QL AUTO: ABNORMAL /HPF
BASOPHILS # BLD AUTO: 0.05 THOUSANDS/ÂΜL (ref 0–0.1)
BASOPHILS NFR BLD AUTO: 1 % (ref 0–1)
BILIRUB UR QL STRIP: NEGATIVE
BUN SERPL-MCNC: 15 MG/DL (ref 5–25)
CALCIUM SERPL-MCNC: 8.8 MG/DL (ref 8.4–10.2)
CARDIAC TROPONIN I PNL SERPL HS: <2 NG/L
CHLORIDE SERPL-SCNC: 105 MMOL/L (ref 96–108)
CLARITY UR: ABNORMAL
CO2 SERPL-SCNC: 23 MMOL/L (ref 21–32)
COLOR UR: YELLOW
CREAT SERPL-MCNC: 0.7 MG/DL (ref 0.6–1.3)
D DIMER PPP FEU-MCNC: 0.6 UG/ML FEU
EOSINOPHIL # BLD AUTO: 0.16 THOUSAND/ÂΜL (ref 0–0.61)
EOSINOPHIL NFR BLD AUTO: 3 % (ref 0–6)
ERYTHROCYTE [DISTWIDTH] IN BLOOD BY AUTOMATED COUNT: 12 % (ref 11.6–15.1)
EXT PREGNANCY TEST URINE: NEGATIVE
EXT. CONTROL: NORMAL
GFR SERPL CREATININE-BSD FRML MDRD: 116 ML/MIN/1.73SQ M
GLUCOSE SERPL-MCNC: 104 MG/DL (ref 65–140)
GLUCOSE UR STRIP-MCNC: NEGATIVE MG/DL
HCT VFR BLD AUTO: 39.5 % (ref 34.8–46.1)
HGB BLD-MCNC: 12.9 G/DL (ref 11.5–15.4)
HGB UR QL STRIP.AUTO: ABNORMAL
IMM GRANULOCYTES # BLD AUTO: 0.02 THOUSAND/UL (ref 0–0.2)
IMM GRANULOCYTES NFR BLD AUTO: 0 % (ref 0–2)
INR PPP: 0.87 (ref 0.84–1.19)
KETONES UR STRIP-MCNC: NEGATIVE MG/DL
LEUKOCYTE ESTERASE UR QL STRIP: ABNORMAL
LYMPHOCYTES # BLD AUTO: 1.13 THOUSANDS/ÂΜL (ref 0.6–4.47)
LYMPHOCYTES NFR BLD AUTO: 19 % (ref 14–44)
MCH RBC QN AUTO: 30.1 PG (ref 26.8–34.3)
MCHC RBC AUTO-ENTMCNC: 32.7 G/DL (ref 31.4–37.4)
MCV RBC AUTO: 92 FL (ref 82–98)
MONOCYTES # BLD AUTO: 0.58 THOUSAND/ÂΜL (ref 0.17–1.22)
MONOCYTES NFR BLD AUTO: 10 % (ref 4–12)
NEUTROPHILS # BLD AUTO: 4.01 THOUSANDS/ÂΜL (ref 1.85–7.62)
NEUTS SEG NFR BLD AUTO: 67 % (ref 43–75)
NITRITE UR QL STRIP: NEGATIVE
NON-SQ EPI CELLS URNS QL MICRO: ABNORMAL /HPF
NRBC BLD AUTO-RTO: 0 /100 WBCS
P AXIS: 67 DEGREES
PH UR STRIP.AUTO: 6 [PH]
PLATELET # BLD AUTO: 259 THOUSANDS/UL (ref 149–390)
PMV BLD AUTO: 9.9 FL (ref 8.9–12.7)
POTASSIUM SERPL-SCNC: 3.9 MMOL/L (ref 3.5–5.3)
PR INTERVAL: 166 MS
PROT UR STRIP-MCNC: NEGATIVE MG/DL
PROTHROMBIN TIME: 11.8 SECONDS (ref 11.6–14.5)
QRS AXIS: 60 DEGREES
QRSD INTERVAL: 72 MS
QT INTERVAL: 380 MS
QTC INTERVAL: 449 MS
RBC # BLD AUTO: 4.29 MILLION/UL (ref 3.81–5.12)
RBC #/AREA URNS AUTO: ABNORMAL /HPF
SODIUM SERPL-SCNC: 136 MMOL/L (ref 135–147)
SP GR UR STRIP.AUTO: 1.02
T WAVE AXIS: 71 DEGREES
UROBILINOGEN UR QL STRIP.AUTO: 0.2 E.U./DL
VENTRICULAR RATE: 84 BPM
WBC # BLD AUTO: 5.95 THOUSAND/UL (ref 4.31–10.16)
WBC #/AREA URNS AUTO: ABNORMAL /HPF

## 2023-03-15 RX ADMIN — IOHEXOL 85 ML: 350 INJECTION, SOLUTION INTRAVENOUS at 10:47

## 2023-03-15 NOTE — ED NOTES
Patient transported to [facility] [facility], RN  03/15/23 3086

## 2023-03-15 NOTE — Clinical Note
Janice Richards was seen and treated in our emergency department on 3/15/2023  Diagnosis:     Rebecca  may return to work on return date  She may return on this date: 03/16/2023         If you have any questions or concerns, please don't hesitate to call        Ladean Lundborg, MD    ______________________________           _______________          _______________  [facility]                              Date                                Time

## 2023-03-15 NOTE — ED PROVIDER NOTES
History  Chief Complaint   Patient presents with   • Shortness of Breath     Patient reports back pain that started 3 days ago  Today patient reports shortness of breath and chest pain  Hx of PE  Also recent anxiety issues  History provided by:  Patient   used: No      Is a 29-year-old female presented to ED with the complaint of right-sided upper back pain shortness of breath and chest pain ongoing for 3 days  Patient says she initially started with upper back pain then started with chest pain  States that a similar episode when she had a PE  Patient also stated that she is going through a lot of issues at work and it is extremely anxious  Rosangela Bernalted He is rating his pain 7 out of 10  However she does not want anything for pain at this time  Prior to Admission Medications   Prescriptions Last Dose Informant Patient Reported? Taking? PARoxetine (PAXIL) 40 MG tablet   No No   Sig: Take 1 tablet (40 mg total) by mouth every morning   albuterol (ProAir HFA) 90 mcg/act inhaler   No No   Sig: Inhale 2 puffs every 6 (six) hours as needed for wheezing or shortness of breath   clonazePAM (KlonoPIN) 0 5 mg tablet   No No   Sig: Take 1 tablet (0 5 mg total) by mouth 2 (two) times a day As needed   ergocalciferol (VITAMIN D2) 50,000 units   No No   Sig: Take 1 capsule (50,000 Units total) by mouth once a week   rizatriptan (MAXALT) 10 mg tablet   No No   Sig: Take 1 tablet (10 mg total) by mouth as needed for migraine Take at the onset of migraine; if symptoms continue or return, may take another dose at least 2 hours after first dose  Take no more than 2 doses in a day        Facility-Administered Medications: None       Past Medical History:   Diagnosis Date   • Irritable bowel syndrome     last assessed: 10/23/2015   • Maternal obesity, antepartum     last assessed: 2017   • Migraine        Past Surgical History:   Procedure Laterality Date   •  SECTION     • FOOT SURGERY         Family History   Problem Relation Age of Onset   • Irritable bowel syndrome Mother    • Ovarian cancer Family    • Ovarian cancer Family      I have reviewed and agree with the history as documented  E-Cigarette/Vaping   • E-Cigarette Use Never User      E-Cigarette/Vaping Substances     Social History     Tobacco Use   • Smoking status: Never   • Smokeless tobacco: Never   Vaping Use   • Vaping Use: Never used   Substance Use Topics   • Alcohol use: No   • Drug use: No       Review of Systems   Constitutional: Negative  HENT: Negative  Eyes: Negative  Respiratory: Positive for shortness of breath  Cardiovascular: Positive for chest pain  Gastrointestinal: Negative  Endocrine: Negative  Genitourinary: Negative  Musculoskeletal: Positive for back pain  Skin: Negative  Allergic/Immunologic: Negative  Neurological: Negative  Hematological: Negative  Psychiatric/Behavioral: Negative  Physical Exam  Physical Exam  Vitals and nursing note reviewed  Constitutional:       Appearance: She is well-developed  HENT:      Head: Normocephalic and atraumatic  Right Ear: External ear normal       Left Ear: External ear normal       Nose: Nose normal       Mouth/Throat:      Mouth: Mucous membranes are moist       Pharynx: No oropharyngeal exudate  Eyes:      General: No scleral icterus  Right eye: No discharge  Left eye: No discharge  Conjunctiva/sclera: Conjunctivae normal       Pupils: Pupils are equal, round, and reactive to light  Cardiovascular:      Rate and Rhythm: Normal rate and regular rhythm  Pulses: Normal pulses  Heart sounds: Normal heart sounds  Pulmonary:      Effort: Pulmonary effort is normal  No respiratory distress  Breath sounds: Normal breath sounds  No wheezing  Abdominal:      General: Bowel sounds are normal       Palpations: Abdomen is soft  Musculoskeletal:         General: Normal range of motion        Cervical back: Normal range of motion and neck supple  Lymphadenopathy:      Cervical: No cervical adenopathy  Skin:     General: Skin is warm and dry  Capillary Refill: Capillary refill takes less than 2 seconds  Neurological:      General: No focal deficit present  Mental Status: She is alert and oriented to person, place, and time     Psychiatric:         Mood and Affect: Mood normal          Behavior: Behavior normal          Vital Signs  ED Triage Vitals   Temperature Pulse Respirations Blood Pressure SpO2   03/15/23 0906 03/15/23 0904 03/15/23 0904 03/15/23 0904 03/15/23 0904   (!) 97 1 °F (36 2 °C) 86 13 144/76 96 %      Temp Source Heart Rate Source Patient Position - Orthostatic VS BP Location FiO2 (%)   03/15/23 0906 03/15/23 1030 03/15/23 0904 03/15/23 0904 --   Tympanic Monitor Sitting Left arm       Pain Score       03/15/23 0904       7           Vitals:    03/15/23 0904 03/15/23 1030 03/15/23 1100   BP: 144/76 142/76 118/61   Pulse: 86 80 83   Patient Position - Orthostatic VS: Sitting Sitting          Visual Acuity      ED Medications  Medications   iohexol (OMNIPAQUE) 350 MG/ML injection (SINGLE-DOSE) 85 mL (85 mL Intravenous Given 3/15/23 1047)       Diagnostic Studies  Results Reviewed     Procedure Component Value Units Date/Time    D-Dimer [[id number]]  (Abnormal) Collected: 03/15/23 0919    Lab Status: Final result Specimen: Blood from Arm, Right Updated: 03/15/23 1013     D-Dimer, Quant 0 60 ug/ml FEU     Protime-INR [[id number]]  (Normal) Collected: 03/15/23 0919    Lab Status: Final result Specimen: Blood from Arm, Right Updated: 03/15/23 1006     Protime 11 8 seconds      INR 0 87    Urine Microscopic [[id number]]  (Abnormal) Collected: 03/15/23 0940    Lab Status: Final result Specimen: Urine, Clean Catch Updated: 03/15/23 1000     RBC, UA 0-1 /hpf      WBC, UA 2-4 /hpf      Epithelial Cells Moderate /hpf      Bacteria, UA Occasional /hpf     HS Troponin 0hr (reflex protocol) [[id number]]  (Normal) Collected: 03/15/23 0919    Lab Status: Final result Specimen: Blood from Arm, Right Updated: 03/15/23 0959     hs TnI 0hr <2 ng/L     Basic metabolic panel [[id number]] Collected: 03/15/23 0919    Lab Status: Final result Specimen: Blood from Arm, Right Updated: 03/15/23 0950     Sodium 136 mmol/L      Potassium 3 9 mmol/L      Chloride 105 mmol/L      CO2 23 mmol/L      ANION GAP 8 mmol/L      BUN 15 mg/dL      Creatinine 0 70 mg/dL      Glucose 104 mg/dL      Calcium 8 8 mg/dL      eGFR 116 ml/min/1 73sq m     Narrative:      [place] guidelines for Chronic Kidney Disease (CKD):   •  Stage 1 with normal or high GFR (GFR > 90 mL/min/1 73 square meters)  •  Stage 2 Mild CKD (GFR = 60-89 mL/min/1 73 square meters)  •  Stage 3A Moderate CKD (GFR = 45-59 mL/min/1 73 square meters)  •  Stage 3B Moderate CKD (GFR = 30-44 mL/min/1 73 square meters)  •  Stage 4 Severe CKD (GFR = 15-29 mL/min/1 73 square meters)  •  Stage 5 End Stage CKD (GFR <15 mL/min/1 73 square meters)  Note: GFR calculation is accurate only with a steady state creatinine    POCT pregnancy, urine [[id number]]  (Normal) Resulted: 03/15/23 0946    Lab Status: Final result Updated: 03/15/23 0947     EXT Preg Test, Ur Negative     Control Valid    UA w Reflex to Microscopic w Reflex to Culture [[id number]]  (Abnormal) Collected: 03/15/23 0940    Lab Status: Final result Specimen: Urine, Clean Catch Updated: 03/15/23 0946     Color, UA Yellow     Clarity, UA Slightly Cloudy     Specific Gravity, UA 1 025     pH, UA 6 0     Leukocytes, UA Trace     Nitrite, UA Negative     Protein, UA Negative mg/dl      Glucose, UA Negative mg/dl      Ketones, UA Negative mg/dl      Urobilinogen, UA 0 2 E U /dl      Bilirubin, UA Negative     Occult Blood, UA Trace-Intact    CBC and differential [[id number]] Collected: 03/15/23 0919    Lab Status: Final result Specimen: Blood from Arm, Right Updated: 03/15/23 0932     WBC 5 95 Thousand/uL      RBC 4 29 Million/uL      Hemoglobin 12 9 g/dL      Hematocrit 39 5 %      MCV 92 fL      MCH 30 1 pg      MCHC 32 7 g/dL      RDW 12 0 %      MPV 9 9 fL      Platelets 366 Thousands/uL      nRBC 0 /100 WBCs      Neutrophils Relative 67 %      Immat GRANS % 0 %      Lymphocytes Relative 19 %      Monocytes Relative 10 %      Eosinophils Relative 3 %      Basophils Relative 1 %      Neutrophils Absolute 4 01 Thousands/µL      Immature Grans Absolute 0 02 Thousand/uL      Lymphocytes Absolute 1 13 Thousands/µL      Monocytes Absolute 0 58 Thousand/µL      Eosinophils Absolute 0 16 Thousand/µL      Basophils Absolute 0 05 Thousands/µL                  CTA ED chest PE Study   Final Result by Chencho Deleon MD (03/15 1134)      No evidence for pulmonary embolism  Workstation performed: [id number]         XR chest 1 view portable   Final Result by Shakir Camacho MD (03/15 1047)      No acute cardiopulmonary disease  Workstation performed: [id number]                    Procedures  Procedures         ED Course  ED Course as of 03/15/23 1201   Wed Mar 15, 2023   1137 No evidence for pulmonary embolism               HEART Risk Score    Flowsheet Row Most Recent Value   Heart Score Risk Calculator    History 0 Filed at: 03/15/2023 1157   ECG 0 Filed at: 03/15/2023 1157   Age 0 Filed at: 03/15/2023 1157   Risk Factors 0 Filed at: 03/15/2023 1157   Troponin 0 Filed at: 03/15/2023 1157   HEART Score 0 Filed at: 03/15/2023 1157                PERC Rule for PE    Flowsheet Row Most Recent Value   PERC Rule for PE    Age >=50 0 Filed at: 03/15/2023 0907   HR >=100 0 Filed at: 03/15/2023 0907   O2 Sat on room air < 95% 0 Filed at: 03/15/2023 2993   History of PE or DVT 1 Filed at: 03/15/2023 9109   Recent trauma or surgery 0 Filed at: 03/15/2023 3945   Hemoptysis 0 Filed at: 03/15/2023 0969   Exogenous estrogen 0 Filed at: 03/15/2023 6316   Unilateral leg swelling 0 Filed at: 03/15/2023 0907   PERC Rule for PE Results 1 Filed at: 03/15/2023 3226                            Medical Decision Making  Patient with history as above presented with chest pain  History obtained from patient     Patient was nontoxic, stable  Ambulatory  Exam as above  EKG reviewed  Labs reviewed  Independently reviewed imaging  Reviewed external records  Differential diagnosis considered  Overall presentation is consistent with low risk chest pain  Low suspicion for ACS  Low risk by EDACS  Low suspicion for PE, low risk by clinical criteria  Low suspicion for pneumothorax, pneumonia, pericarditis, dissection, or other serious cause of chest pain  Patient didn't want any medication at that time  Patient states she has increased anxiety symptoms are more consistent with anxiety  Negative CTA for PE normal troponin  Recommending avoid decrease stress    Consideration was given for admission, but the patient was stable for outpatient management  Disposition: Discussed need to follow up diagnostics, including incidental findings  Discharged with instructions to obtain outpatient follow up of patient's symptoms and findings, with strict return precautions if patient develops new or worsening symptoms  Amount and/or Complexity of Data Reviewed  Labs: ordered  Radiology: ordered  Risk  Prescription drug management            Disposition  Final diagnoses:   Anxiety   Non-cardiac chest pain   Back pain     Time reflects when diagnosis was documented in both MDM as applicable and the Disposition within this note     Time User Action Codes Description Comment    3/15/2023 11:37 AM Delsie Collie Add [F41 9] Anxiety     3/15/2023 11:37 AM Delsie Collie Add [R07 89] Non-cardiac chest pain     3/15/2023 11:37 AM Delsie Collie Add [M54 9] Back pain       ED Disposition     ED Disposition   Discharge    Condition   Stable    Date/Time   Wed Mar 15, 2023 11:37 AM    Comment   Mikey Wallace discharge to home/self care  Follow-up Information     Follow up With Specialties Details Why Wen Reeder [address], [address], Nurse Practitioner Schedule an appointment as soon as possible for a visit in 2 days If symptoms worsen 6 [address]  [phone]            Current Discharge Medication List      CONTINUE these medications which have NOT CHANGED    Details   albuterol (ProAir HFA) 90 mcg/act inhaler Inhale 2 puffs every 6 (six) hours as needed for wheezing or shortness of breath  Qty: 8 5 g, Refills: 0    Comments: Substitution to a formulary equivalent within the same pharmaceutical class is authorized  Associated Diagnoses: Acute cough; Fever in other diseases; Acute bronchitis due to other specified organisms      clonazePAM (KlonoPIN) 0 5 mg tablet Take 1 tablet (0 5 mg total) by mouth 2 (two) times a day As needed  Qty: 60 tablet, Refills: 0    Associated Diagnoses: Anxiety      ergocalciferol (VITAMIN D2) 50,000 units Take 1 capsule (50,000 Units total) by mouth once a week  Qty: 4 capsule, Refills: 3    Associated Diagnoses: Vitamin D deficiency      PARoxetine (PAXIL) 40 MG tablet Take 1 tablet (40 mg total) by mouth every morning  Qty: 30 tablet, Refills: 0    Associated Diagnoses: Anxiety and depression      rizatriptan (MAXALT) 10 mg tablet Take 1 tablet (10 mg total) by mouth as needed for migraine Take at the onset of migraine; if symptoms continue or return, may take another dose at least 2 hours after first dose  Take no more than 2 doses in a day  Qty: 18 tablet, Refills: 0    Associated Diagnoses: Cluster headache, not intractable, unspecified chronicity pattern             No discharge procedures on file      PDMP Review       Value Time User    PDMP Reviewed  Yes 2/1/2023  8:12 AM Kam Sarmiento, [address]           ED Provider  Electronically Signed by           Kandy Murrell MD  03/15/23 8590

## 2023-03-16 ENCOUNTER — OFFICE VISIT (OUTPATIENT)
Dept: INTERNAL MEDICINE CLINIC | Facility: CLINIC | Age: 33
End: 2023-03-16

## 2023-03-16 VITALS
BODY MASS INDEX: 36.35 KG/M2 | TEMPERATURE: 97.6 F | WEIGHT: 212.9 LBS | HEIGHT: 64 IN | OXYGEN SATURATION: 98 % | HEART RATE: 94 BPM

## 2023-03-16 DIAGNOSIS — R00.0 TACHYCARDIA: ICD-10-CM

## 2023-03-16 DIAGNOSIS — R07.89 CHEST TIGHTNESS: ICD-10-CM

## 2023-03-16 DIAGNOSIS — F41.9 ANXIETY: Primary | ICD-10-CM

## 2023-03-16 NOTE — PROGRESS NOTES
Name: Peyton Gustafson      : 1990      MRN: [id number]  Encounter Provider: Phillis Paget, CRNP  Encounter Date: 3/16/2023   Encounter department: [address] [place] [address],[address]    Assessment & Plan Patient did have CTA which was negative  Will start on Lopressor 25 mg BID  Will continue to monitor at home and will bring her back in 2 weeks for a BP recheck  1  Anxiety    2  Tachycardia  -     metoprolol tartrate (LOPRESSOR) 25 mg tablet; Take 1 tablet (25 mg total) by mouth every 12 (twelve) hours    3  Chest tightness  -     metoprolol tartrate (LOPRESSOR) 25 mg tablet; Take 1 tablet (25 mg total) by mouth every 12 (twelve) hours           Subjective      Rebecca is for an acute visit  She was in the ER yesterday with chest pain and SOB  She states she was worried about possibly having another PE due to her symptoms  She is having a lot of stress at work and is in the process  She is having some chest tightness and has been checking her Bps at home and they have been running high  She is just worried something more is wrong  She is taking Paxil and Klonopin and does well on these medications  She offers no other issues  Review of Systems   Respiratory: Positive for chest tightness  Cardiovascular: Positive for palpitations  All other systems reviewed and are negative        Current Outpatient Medications on File Prior to Visit   Medication Sig   • albuterol (ProAir HFA) 90 mcg/act inhaler Inhale 2 puffs every 6 (six) hours as needed for wheezing or shortness of breath   • clonazePAM (KlonoPIN) 0 5 mg tablet Take 1 tablet (0 5 mg total) by mouth 2 (two) times a day As needed   • ergocalciferol (VITAMIN D2) 50,000 units Take 1 capsule (50,000 Units total) by mouth once a week   • PARoxetine (PAXIL) 40 MG tablet Take 1 tablet (40 mg total) by mouth every morning   • rizatriptan (MAXALT) 10 mg tablet Take 1 tablet (10 mg total) by mouth as needed for migraine Take at the onset of migraine; if symptoms continue or return, may take another dose at least 2 hours after first dose  Take no more than 2 doses in a day  Objective     Pulse 94   Temp 97 6 °F (36 4 °C) (Temporal)   Ht 5' 4" (1 626 m)   Wt 96 6 kg (212 lb 14 4 oz)   SpO2 98%   BMI 36 54 kg/m²     Physical Exam  Vitals reviewed  Constitutional:       Appearance: Normal appearance  She is normal weight  Cardiovascular:      Rate and Rhythm: Normal rate and regular rhythm  Pulses: Normal pulses  Heart sounds: Normal heart sounds  Pulmonary:      Effort: Pulmonary effort is normal       Breath sounds: Normal breath sounds  Musculoskeletal:         General: Normal range of motion  Skin:     General: Skin is warm and dry  Capillary Refill: Capillary refill takes less than 2 seconds  Neurological:      General: No focal deficit present  Mental Status: She is alert and oriented to person, place, and time  Mental status is at baseline  Psychiatric:         Mood and Affect: Mood normal          Behavior: Behavior normal          Thought Content:  Thought content normal          Judgment: Judgment normal        TAHIR Davis

## 2023-03-16 NOTE — LETTER
March 16, 2023     Patient: June Allison  YOB: 1990  Date of Visit: 3/16/2023      To Whom it May Concern:    Please excuse Rebecca from work from Wednesday 03/15/23 to Tuesday 03/21/2023  If you have any questions or concerns, please don't hesitate to call  Sincerely,          TAHIR Mar        CC: Rebecca Gorman

## 2023-03-22 ENCOUNTER — RA CDI HCC (OUTPATIENT)
Dept: OTHER | Facility: HOSPITAL | Age: 33
End: 2023-03-22

## 2023-03-22 NOTE — PROGRESS NOTES
[facility] 75  coding opportunities          Chart Reviewed number of suggestions sent to Provider: 1    F33 0 Major depressive disorder, recurrent, mild  OR  F33 1 Major depressive disorder, recurrent, moderate  OR  F33 41 Major depressive disorder, recurrent, in partial remission  OR  F33 42 Major depressive disorder, recurrent in full remission  OR  F33 8 Other recurrent depressive disorders     Patients Insurance        Commercial Insurance: [employer]

## 2023-04-05 ENCOUNTER — OCCMED (OUTPATIENT)
Dept: URGENT CARE | Facility: CLINIC | Age: 33
End: 2023-04-05

## 2023-04-05 DIAGNOSIS — Z02.1 PRE-EMPLOYMENT EXAMINATION: Primary | ICD-10-CM

## 2023-04-05 LAB
MEV IGG SER QL IA: NORMAL
MUV IGG SER QL IA: ABNORMAL
RUBV IGG SERPL IA-ACNC: 107 IU/ML
VZV IGG SER QL IA: NORMAL

## 2023-04-07 LAB
GAMMA INTERFERON BACKGROUND BLD IA-ACNC: 0.03 IU/ML
M TB IFN-G BLD-IMP: NEGATIVE
M TB IFN-G CD4+ BCKGRND COR BLD-ACNC: 0 IU/ML
M TB IFN-G CD4+ BCKGRND COR BLD-ACNC: 0 IU/ML
MITOGEN IGNF BCKGRD COR BLD-ACNC: >10 IU/ML

## 2023-07-12 ENCOUNTER — OFFICE VISIT (OUTPATIENT)
Dept: INTERNAL MEDICINE CLINIC | Facility: CLINIC | Age: 33
End: 2023-07-12
Payer: COMMERCIAL

## 2023-07-12 DIAGNOSIS — H10.023 PINK EYE DISEASE, BILATERAL: Primary | ICD-10-CM

## 2023-07-12 PROCEDURE — 99214 OFFICE O/P EST MOD 30 MIN: CPT | Performed by: NURSE PRACTITIONER

## 2023-07-12 RX ORDER — POLYMYXIN B SULFATE AND TRIMETHOPRIM 1; 10000 MG/ML; [USP'U]/ML
1 SOLUTION OPHTHALMIC EVERY 4 HOURS
Qty: 10 ML | Refills: 0 | Status: SHIPPED | OUTPATIENT
Start: 2023-07-12

## 2023-07-12 NOTE — PROGRESS NOTES
Name: Roney Owen      : 1990      MRN: [id number]  Encounter Provider: TAHIR Bahena  Encounter Date: 2023   Encounter department: [address] [facility] Will start on Polutrim to BL eyes every 4 hours for 5 days. If any worsening of symptoms did advise to call the office. 1. Pink eye disease, bilateral  -     polymyxin b-trimethoprim (POLYTRIM) ophthalmic solution; Administer 1 drop to both eyes every 4 (four) hours X 5 days           Subjective      Rebecca is for an acute visit. She states she started with eye redness and both eyes are all crusted over. She states she had a horrible migraine yesterday and woke up with her eyes worse. She denies any cough, congestion, or sore throat. She offers no other issues. Review of Systems   Eyes: Positive for pain, discharge, redness and itching. All other systems reviewed and are negative. Current Outpatient Medications on File Prior to Visit   Medication Sig   • albuterol (ProAir HFA) 90 mcg/act inhaler Inhale 2 puffs every 6 (six) hours as needed for wheezing or shortness of breath   • clonazePAM (KlonoPIN) 0.5 mg tablet Take 1 tablet (0.5 mg total) by mouth 2 (two) times a day As needed   • ergocalciferol (VITAMIN D2) 50,000 units Take 1 capsule (50,000 Units total) by mouth once a week   • metoprolol tartrate (LOPRESSOR) 25 mg tablet Take 1 tablet (25 mg total) by mouth every 12 (twelve) hours   • PARoxetine (PAXIL) 40 MG tablet Take 1 tablet (40 mg total) by mouth every morning   • rizatriptan (MAXALT) 10 mg tablet Take 1 tablet (10 mg total) by mouth as needed for migraine Take at the onset of migraine; if symptoms continue or return, may take another dose at least 2 hours after first dose. Take no more than 2 doses in a day. Objective     There were no vitals taken for this visit. Physical Exam  Vitals reviewed. Constitutional:       Appearance: Normal appearance.  She is normal weight. Eyes:      General:         Right eye: Discharge present. Left eye: Discharge present. Cardiovascular:      Rate and Rhythm: Normal rate and regular rhythm. Pulses: Normal pulses. Heart sounds: Normal heart sounds. Pulmonary:      Effort: Pulmonary effort is normal.      Breath sounds: Normal breath sounds. Musculoskeletal:         General: Normal range of motion. Skin:     General: Skin is warm. Capillary Refill: Capillary refill takes less than 2 seconds. Neurological:      General: No focal deficit present. Mental Status: She is alert and oriented to person, place, and time. Mental status is at baseline. Psychiatric:         Mood and Affect: Mood normal.         Behavior: Behavior normal.         Thought Content:  Thought content normal.         Judgment: Judgment normal.       TAHIR Kellogg

## 2023-07-21 ENCOUNTER — TELEPHONE (OUTPATIENT)
Dept: OBGYN CLINIC | Facility: CLINIC | Age: 33
End: 2023-07-21

## 2023-07-21 NOTE — TELEPHONE ENCOUNTER
L/m for patient advising that she has [place] only insurance ([facility]) and therefore cannot be seen in [place]. Patient needs to set up with Raynard Habermann or another provider in [place]. Advised appt would be cancelled.

## 2023-07-26 ENCOUNTER — TELEPHONE (OUTPATIENT)
Dept: OBGYN CLINIC | Facility: MEDICAL CENTER | Age: 33
End: 2023-07-26

## 2023-07-26 DIAGNOSIS — Z32.01 POSITIVE PREGNANCY TEST: Primary | ICD-10-CM

## 2023-07-26 NOTE — TELEPHONE ENCOUNTER
Placed pregnancy labs for patient to get completed. Advised patient of provider recommendations - we cannot prescribe medication for the patient until she is evaluated by one of our providers. If she feels she needs medication prior to that time, she can reach out to her previous OBGYN at [facility].

## 2023-07-26 NOTE — TELEPHONE ENCOUNTER
She has never been seen by us prior. She needs an assessment by a provider before we can prescribe medication. [facility] followed her last pregnancy and if she feels she needs meds prior to her appt she needs to call them.

## 2023-07-26 NOTE — TELEPHONE ENCOUNTER
Patient LMP 06/22/23, got a positive pregnancy test. She would like to be [facility] due to her being in [facility], but she is aware of our other offices. Advised patient to get labs done on 8/3/23 or later to ensure she is at least 6 weeks when she gets labs done. She knows to go to any [facility]'s lab to get them completed and that we will call her with results to set up first appointment. Patient's last pregnancy she stated she had a pulmonary embolism at 10 weeks gestation and was told by her provider at the time if she would get pregnant she would have to be put on blood thinners again. If that can be reviewed as a possible treatment plan for her pregnancy this time pending her previous visit information from [facility], confirmation of pregnancy via labs, and advisement from one of the providers after reviewing she would appreciate further instruction. Please advise, thank you.

## 2023-07-27 ENCOUNTER — HOSPITAL ENCOUNTER (EMERGENCY)
Facility: HOSPITAL | Age: 33
Discharge: HOME | End: 2023-07-27
Attending: EMERGENCY MEDICINE
Payer: COMMERCIAL

## 2023-07-27 ENCOUNTER — APPOINTMENT (EMERGENCY)
Dept: NON INVASIVE DIAGNOSTICS | Facility: HOSPITAL | Age: 33
End: 2023-07-27
Payer: COMMERCIAL

## 2023-07-27 VITALS
DIASTOLIC BLOOD PRESSURE: 81 MMHG | OXYGEN SATURATION: 95 % | TEMPERATURE: 98.4 F | RESPIRATION RATE: 18 BRPM | SYSTOLIC BLOOD PRESSURE: 144 MMHG | HEART RATE: 99 BPM

## 2023-07-27 DIAGNOSIS — S76.311A STRAIN OF RIGHT HAMSTRING, INITIAL ENCOUNTER: Primary | ICD-10-CM

## 2023-07-27 PROCEDURE — 99284 EMERGENCY DEPT VISIT MOD MDM: CPT

## 2023-07-27 PROCEDURE — 93971 EXTREMITY STUDY: CPT | Performed by: SURGERY

## 2023-07-27 PROCEDURE — 99284 EMERGENCY DEPT VISIT MOD MDM: CPT | Performed by: EMERGENCY MEDICINE

## 2023-07-27 PROCEDURE — 93971 EXTREMITY STUDY: CPT

## 2023-07-27 NOTE — ED PROVIDER NOTES
History  Chief Complaint   Patient presents with   • Leg Pain     Patient reports right calf/lower leg pain that started yesterday. Patient has hx of pulmonary embolism during pregnancy and recently found out she is pregnant again (4-5 wks)      Patient is a 82-year-old female who presents for evaluation of right posterior leg pain. Patient says this pain is located from the distal right hamstring down to the right calf. He says she noticed the symptoms starting yesterday. She says she found out she was pregnant on Monday and is about 4 to 5 weeks gestation. She said that she developed a blood clot in her lungs with her previous pregnancy at about 10 to 12 weeks. She said prior to that she had some leg pain in a similar location that she "blew off."  She is not on blood thinners at this time. She denies any traumatic injury to the leg. She denies chest pain, shortness of breath, lightheadedness or dizziness. Prior to Admission Medications   Prescriptions Last Dose Informant Patient Reported? Taking?    PARoxetine (PAXIL) 40 MG tablet Not Taking  No No   Sig: Take 1 tablet (40 mg total) by mouth every morning   Patient not taking: Reported on 7/27/2023   albuterol (ProAir HFA) 90 mcg/act inhaler Not Taking  No No   Sig: Inhale 2 puffs every 6 (six) hours as needed for wheezing or shortness of breath   Patient not taking: Reported on 7/27/2023   clonazePAM (KlonoPIN) 0.5 mg tablet Not Taking  No No   Sig: Take 1 tablet (0.5 mg total) by mouth 2 (two) times a day As needed   Patient not taking: Reported on 7/27/2023   ergocalciferol (VITAMIN D2) 50,000 units Not Taking  No No   Sig: Take 1 capsule (50,000 Units total) by mouth once a week   Patient not taking: Reported on 7/27/2023   metoprolol tartrate (LOPRESSOR) 25 mg tablet Not Taking  No No   Sig: Take 1 tablet (25 mg total) by mouth every 12 (twelve) hours   Patient not taking: Reported on 7/27/2023   polymyxin b-trimethoprim (POLYTRIM) ophthalmic solution Not Taking  No No   Sig: Administer 1 drop to both eyes every 4 (four) hours X 5 days   Patient not taking: Reported on 2023   rizatriptan (MAXALT) 10 mg tablet Not Taking  No No   Sig: Take 1 tablet (10 mg total) by mouth as needed for migraine Take at the onset of migraine; if symptoms continue or return, may take another dose at least 2 hours after first dose. Take no more than 2 doses in a day. Patient not taking: Reported on 2023      Facility-Administered Medications: None       Past Medical History:   Diagnosis Date   • Irritable bowel syndrome     last assessed: 10/23/2015   • Maternal obesity, antepartum     last assessed: 2017   • Migraine        Past Surgical History:   Procedure Laterality Date   •  SECTION     • FOOT SURGERY         Family History   Problem Relation Age of Onset   • Irritable bowel syndrome Mother    • Ovarian cancer Family    • Ovarian cancer Family      I have reviewed and agree with the history as documented. E-Cigarette/Vaping   • E-Cigarette Use Never User      E-Cigarette/Vaping Substances     Social History     Tobacco Use   • Smoking status: Never   • Smokeless tobacco: Never   Vaping Use   • Vaping Use: Never used   Substance Use Topics   • Alcohol use: No   • Drug use: No       Review of Systems   Constitutional: Negative for chills, diaphoresis and fever. HENT: Negative for congestion, sinus pressure, sore throat and trouble swallowing. Eyes: Negative for pain, discharge and itching. Respiratory: Negative for cough, chest tightness, shortness of breath and wheezing. Cardiovascular: Negative for chest pain, palpitations and leg swelling. Gastrointestinal: Negative for abdominal distention, abdominal pain, blood in stool, diarrhea, nausea and vomiting. Endocrine: Negative for polyphagia and polyuria. Genitourinary: Negative for difficulty urinating, dysuria, flank pain, hematuria, pelvic pain and vaginal bleeding. Musculoskeletal: Negative for arthralgias, back pain and myalgias. Right leg pain     Skin: Negative for rash. Neurological: Negative for dizziness, syncope, weakness, light-headedness and headaches. Physical Exam  Physical Exam  Vitals and nursing note reviewed. Constitutional:       General: She is not in acute distress. Appearance: She is well-developed. HENT:      Head: Normocephalic and atraumatic. Right Ear: External ear normal.      Left Ear: External ear normal.      Nose: Nose normal.      Mouth/Throat:      Mouth: Mucous membranes are moist.      Pharynx: No oropharyngeal exudate. Eyes:      Conjunctiva/sclera: Conjunctivae normal.      Pupils: Pupils are equal, round, and reactive to light. Cardiovascular:      Rate and Rhythm: Normal rate and regular rhythm. Heart sounds: Normal heart sounds. No murmur heard. No friction rub. No gallop. Pulmonary:      Effort: Pulmonary effort is normal. No respiratory distress. Breath sounds: Normal breath sounds. No wheezing or rales. Abdominal:      General: There is no distension. Palpations: Abdomen is soft. Tenderness: There is no abdominal tenderness. There is no guarding. Musculoskeletal:         General: Tenderness present. No swelling or deformity. Normal range of motion. Cervical back: Normal range of motion and neck supple. Legs:       Comments: No redness, no appreciable swelling     Lymphadenopathy:      Cervical: No cervical adenopathy. Skin:     General: Skin is warm and dry. Neurological:      General: No focal deficit present. Mental Status: She is alert and oriented to person, place, and time. Mental status is at baseline. Cranial Nerves: No cranial nerve deficit. Sensory: No sensory deficit. Motor: No weakness or abnormal muscle tone.       Coordination: Coordination normal.         Vital Signs  ED Triage Vitals [07/27/23 1223]   Temperature Pulse Respirations Blood Pressure SpO2   98.4 °F (36.9 °C) 99 18 144/81 95 %      Temp Source Heart Rate Source Patient Position - Orthostatic VS BP Location FiO2 (%)   Temporal -- Sitting Left arm --      Pain Score       8           Vitals:    07/27/23 1223   BP: 144/81   Pulse: 99   Patient Position - Orthostatic VS: Sitting         Visual Acuity      ED Medications  Medications - No data to display    Diagnostic Studies  Results Reviewed     None                 VAS lower limb venous duplex study, unilateral/limited    (Results Pending)              Procedures  Procedures         ED Course  ED Course as of 07/27/23 1550   Thu Jul 27, 2023   1315 Per tech, bedside DVT study negative                               SBIRT 20yo+    Flowsheet Row Most Recent Value   Initial Alcohol Screen: US AUDIT-C     1. How often do you have a drink containing alcohol? 0 Filed at: 07/27/2023 1222   2. How many drinks containing alcohol do you have on a typical day you are drinking? 0 Filed at: 07/27/2023 1222   3a. Male UNDER 65: How often do you have five or more drinks on one occasion? 0 Filed at: 07/27/2023 1222   3b. FEMALE Any Age, or MALE 65+: How often do you have 4 or more drinks on one occassion? 0 Filed at: 07/27/2023 1222   Audit-C Score 0 Filed at: 07/27/2023 1222   VIKY: How many times in the past year have you. .. Used an illegal drug or used a prescription medication for non-medical reasons? Never Filed at: 07/27/2023 1222                    Medical Decision Making  82-year-old female presenting for evaluation of right lower leg pain. Pain located in posterior hamstring, right calf. Pain started yesterday. No trauma. History of DVT while pregnant. Found out she was pregnant about 4 to 5 weeks. Last chest pain, shortness glands or dizziness. Denies abdominal pain, vaginal bleeding or discharge. No redness or swelling to lower extremity.   Differential includes muscular strain versus DVT  We will obtain duplex ultrasound of right lower extremity. Disposition  Final diagnoses:   Strain of right hamstring, initial encounter     Time reflects when diagnosis was documented in both MDM as applicable and the Disposition within this note     Time User Action Codes Description Comment    7/27/2023  1:15 PM Maulik Longo [Y78.052C] Strain of right hamstring, initial encounter       ED Disposition     ED Disposition   Discharge    Condition   Stable    Date/Time   Thu Jul 27, 2023  1:15 PM    Comment   Kendrick Halt discharge to home/self care.                Follow-up Information     Follow up With Specialties Details Why Contact Info Additional [address], [address] [facility], Nurse Practitioner Schedule an appointment as soon as possible for a visit  As needed [address]       [address] [facility] Emergency Department Emergency Medicine Go to  If symptoms worsen [address] [place] [address]  [phone] [address] [facility] Emergency Department, [address] [address], [facility], [address] [facility]          Discharge Medication List as of 7/27/2023  1:15 PM      CONTINUE these medications which have NOT CHANGED    Details   albuterol (ProAir HFA) 90 mcg/act inhaler Inhale 2 puffs every 6 (six) hours as needed for wheezing or shortness of breath, Starting Fri 11/18/2022, Normal      clonazePAM (KlonoPIN) 0.5 mg tablet Take 1 tablet (0.5 mg total) by mouth 2 (two) times a day As needed, Starting Thu 4/13/2023, Normal      ergocalciferol (VITAMIN D2) 50,000 units Take 1 capsule (50,000 Units total) by mouth once a week, Starting u 4/13/2023, Normal      metoprolol tartrate (LOPRESSOR) 25 mg tablet Take 1 tablet (25 mg total) by mouth every 12 (twelve) hours, Starting Thu 4/13/2023, Normal      PARoxetine (PAXIL) 40 MG tablet Take 1 tablet (40 mg total) by mouth every morning, Starting Thu 4/13/2023, Normal polymyxin b-trimethoprim (POLYTRIM) ophthalmic solution Administer 1 drop to both eyes every 4 (four) hours X 5 days, Starting Wed 7/12/2023, Normal      rizatriptan (MAXALT) 10 mg tablet Take 1 tablet (10 mg total) by mouth as needed for migraine Take at the onset of migraine; if symptoms continue or return, may take another dose at least 2 hours after first dose. Take no more than 2 doses in a day., Starting Thu 7/28/2022, Normal             No discharge procedures on file.     PDMP Review       Value Time User    PDMP Reviewed  Yes 4/13/2023  3:25 PM Enrrique Edmondson, [address] [facility]          ED Provider  Electronically Signed by           Hilario Ramirez DO  07/27/23 2010

## 2023-09-10 PROBLEM — H10.023: Status: RESOLVED | Noted: 2023-07-12 | Resolved: 2023-09-10

## 2023-10-02 ENCOUNTER — OFFICE VISIT (OUTPATIENT)
Dept: INTERNAL MEDICINE CLINIC | Facility: CLINIC | Age: 33
End: 2023-10-02
Payer: COMMERCIAL

## 2023-10-02 VITALS — HEART RATE: 102 BPM | OXYGEN SATURATION: 98 %

## 2023-10-02 DIAGNOSIS — J40 BRONCHITIS: Primary | ICD-10-CM

## 2023-10-02 LAB
SARS-COV-2 AG UPPER RESP QL IA: NEGATIVE
VALID CONTROL: NORMAL

## 2023-10-02 PROCEDURE — 99214 OFFICE O/P EST MOD 30 MIN: CPT | Performed by: NURSE PRACTITIONER

## 2023-10-02 PROCEDURE — 87811 SARS-COV-2 COVID19 W/OPTIC: CPT | Performed by: NURSE PRACTITIONER

## 2023-10-02 RX ORDER — METHYLPREDNISOLONE 4 MG/1
TABLET ORAL
Qty: 21 EACH | Refills: 0 | Status: SHIPPED | OUTPATIENT
Start: 2023-10-02

## 2023-10-02 RX ORDER — GUAIFENESIN AND CODEINE PHOSPHATE 100; 10 MG/5ML; MG/5ML
5 SOLUTION ORAL 3 TIMES DAILY PRN
Qty: 180 ML | Refills: 0 | Status: SHIPPED | OUTPATIENT
Start: 2023-10-02

## 2023-10-02 RX ORDER — DOXYCYCLINE 100 MG/1
100 CAPSULE ORAL 2 TIMES DAILY
Qty: 20 CAPSULE | Refills: 0 | Status: SHIPPED | OUTPATIENT
Start: 2023-10-02 | End: 2023-10-12

## 2023-10-02 NOTE — PROGRESS NOTES
Name: Jenetta Nageotte      : 1990      MRN: [id number]  Encounter Provider: TAHIR Holt  Encounter Date: 10/2/2023   Encounter department: [address] [place] [address] Rapid covid is negative. Will start on Doxy, medrol dose pack and cough elixir. Take Tylenol and Motrin for pain or fever. Will follow up as needed. 1. Bronchitis  -     doxycycline monohydrate (MONODOX) 100 mg capsule; Take 1 capsule (100 mg total) by mouth 2 (two) times a day for 10 days  -     methylPREDNISolone 4 MG tablet therapy pack; Use as directed on package  -     guaifenesin-codeine (GUAIFENESIN AC) 100-10 MG/5ML liquid; Take 5 mL by mouth 3 (three) times a day as needed for cough  -     POCT Rapid Covid Ag           Mitzy Fernandez is for an acute visit. She started with over the weekend cough, congestion, and fever. She did a covid test on Saturday which was negative. She is having a dry cough and at times her chest is hurting from coughing. She is having body aches as well. She offers no other issues. Review of Systems   Constitutional: Positive for fever. HENT: Positive for congestion. Respiratory: Positive for cough. Musculoskeletal: Positive for arthralgias and myalgias. All other systems reviewed and are negative.       Current Outpatient Medications on File Prior to Visit   Medication Sig   • albuterol (ProAir HFA) 90 mcg/act inhaler Inhale 2 puffs every 6 (six) hours as needed for wheezing or shortness of breath (Patient not taking: Reported on 2023)   • clonazePAM (KlonoPIN) 0.5 mg tablet Take 1 tablet (0.5 mg total) by mouth 2 (two) times a day As needed (Patient not taking: Reported on 2023)   • ergocalciferol (VITAMIN D2) 50,000 units Take 1 capsule (50,000 Units total) by mouth once a week (Patient not taking: Reported on 2023)   • metoprolol tartrate (LOPRESSOR) 25 mg tablet Take 1 tablet (25 mg total) by mouth every 12 (twelve) hours (Patient not taking: Reported on 7/27/2023)   • PARoxetine (PAXIL) 40 MG tablet Take 1 tablet (40 mg total) by mouth every morning (Patient not taking: Reported on 7/27/2023)   • polymyxin b-trimethoprim (POLYTRIM) ophthalmic solution Administer 1 drop to both eyes every 4 (four) hours X 5 days (Patient not taking: Reported on 7/27/2023)   • rizatriptan (MAXALT) 10 mg tablet Take 1 tablet (10 mg total) by mouth as needed for migraine Take at the onset of migraine; if symptoms continue or return, may take another dose at least 2 hours after first dose. Take no more than 2 doses in a day. (Patient not taking: Reported on 7/27/2023)       Objective     Pulse 102   LMP 06/27/2023 (Exact Date)   SpO2 98%     Physical Exam  Constitutional:       Appearance: Normal appearance. She is normal weight. HENT:      Right Ear: Tympanic membrane, ear canal and external ear normal.      Left Ear: Tympanic membrane, ear canal and external ear normal.      Nose: Congestion present. Mouth/Throat:      Mouth: Mucous membranes are moist.      Pharynx: Oropharynx is clear. Cardiovascular:      Rate and Rhythm: Regular rhythm. Tachycardia present. Pulses: Normal pulses. Heart sounds: Normal heart sounds. Pulmonary:      Effort: Pulmonary effort is normal.      Breath sounds: Normal breath sounds. Skin:     General: Skin is warm and dry. Capillary Refill: Capillary refill takes less than 2 seconds. Neurological:      General: No focal deficit present. Mental Status: She is alert and oriented to person, place, and time. Mental status is at baseline. Psychiatric:         Mood and Affect: Mood normal.         Behavior: Behavior normal.         Thought Content:  Thought content normal.         Judgment: Judgment normal.       TAHIR Riley

## 2023-10-12 ENCOUNTER — APPOINTMENT (EMERGENCY)
Dept: RADIOLOGY | Facility: HOSPITAL | Age: 33
End: 2023-10-12
Payer: COMMERCIAL

## 2023-10-12 ENCOUNTER — HOSPITAL ENCOUNTER (EMERGENCY)
Facility: HOSPITAL | Age: 33
Discharge: HOME | End: 2023-10-12
Attending: EMERGENCY MEDICINE
Payer: COMMERCIAL

## 2023-10-12 VITALS
TEMPERATURE: 98.9 F | OXYGEN SATURATION: 97 % | RESPIRATION RATE: 18 BRPM | DIASTOLIC BLOOD PRESSURE: 69 MMHG | BODY MASS INDEX: 36.7 KG/M2 | SYSTOLIC BLOOD PRESSURE: 127 MMHG | WEIGHT: 215 LBS | HEART RATE: 100 BPM | HEIGHT: 64 IN

## 2023-10-12 DIAGNOSIS — J40 BRONCHITIS: ICD-10-CM

## 2023-10-12 DIAGNOSIS — B34.9 VIRAL SYNDROME: Primary | ICD-10-CM

## 2023-10-12 LAB
ANION GAP SERPL CALCULATED.3IONS-SCNC: 11 MMOL/L
APTT PPP: 29 SECONDS (ref 23–37)
BACTERIA UR QL AUTO: ABNORMAL /HPF
BASOPHILS # BLD AUTO: 0.09 THOUSANDS/ÂΜL (ref 0–0.1)
BASOPHILS NFR BLD AUTO: 1 % (ref 0–1)
BILIRUB UR QL STRIP: NEGATIVE
BUN SERPL-MCNC: 24 MG/DL (ref 5–25)
CALCIUM SERPL-MCNC: 9.6 MG/DL (ref 8.4–10.2)
CARDIAC TROPONIN I PNL SERPL HS: <2 NG/L
CHLORIDE SERPL-SCNC: 102 MMOL/L (ref 96–108)
CLARITY UR: CLEAR
CO2 SERPL-SCNC: 22 MMOL/L (ref 21–32)
COLOR UR: YELLOW
CREAT SERPL-MCNC: 0.81 MG/DL (ref 0.6–1.3)
D DIMER PPP FEU-MCNC: 0.46 UG/ML FEU
EOSINOPHIL # BLD AUTO: 0.38 THOUSAND/ÂΜL (ref 0–0.61)
EOSINOPHIL NFR BLD AUTO: 3 % (ref 0–6)
ERYTHROCYTE [DISTWIDTH] IN BLOOD BY AUTOMATED COUNT: 12.1 % (ref 11.6–15.1)
EXT PREGNANCY TEST URINE: NEGATIVE
EXT. CONTROL: NORMAL
FLUAV RNA RESP QL NAA+PROBE: NEGATIVE
FLUBV RNA RESP QL NAA+PROBE: NEGATIVE
GFR SERPL CREATININE-BSD FRML MDRD: 97 ML/MIN/1.73SQ M
GLUCOSE SERPL-MCNC: 100 MG/DL (ref 65–140)
GLUCOSE UR STRIP-MCNC: NEGATIVE MG/DL
HCT VFR BLD AUTO: 43.8 % (ref 34.8–46.1)
HGB BLD-MCNC: 14.2 G/DL (ref 11.5–15.4)
HGB UR QL STRIP.AUTO: ABNORMAL
IMM GRANULOCYTES # BLD AUTO: 0.1 THOUSAND/UL (ref 0–0.2)
IMM GRANULOCYTES NFR BLD AUTO: 1 % (ref 0–2)
INR PPP: 0.96 (ref 0.84–1.19)
KETONES UR STRIP-MCNC: NEGATIVE MG/DL
LACTATE SERPL-SCNC: 1.3 MMOL/L (ref 0.5–2)
LEUKOCYTE ESTERASE UR QL STRIP: NEGATIVE
LYMPHOCYTES # BLD AUTO: 4.22 THOUSANDS/ÂΜL (ref 0.6–4.47)
LYMPHOCYTES NFR BLD AUTO: 28 % (ref 14–44)
MCH RBC QN AUTO: 30.7 PG (ref 26.8–34.3)
MCHC RBC AUTO-ENTMCNC: 32.4 G/DL (ref 31.4–37.4)
MCV RBC AUTO: 95 FL (ref 82–98)
MONOCYTES # BLD AUTO: 0.86 THOUSAND/ÂΜL (ref 0.17–1.22)
MONOCYTES NFR BLD AUTO: 6 % (ref 4–12)
NEUTROPHILS # BLD AUTO: 9.21 THOUSANDS/ÂΜL (ref 1.85–7.62)
NEUTS SEG NFR BLD AUTO: 61 % (ref 43–75)
NITRITE UR QL STRIP: NEGATIVE
NON-SQ EPI CELLS URNS QL MICRO: ABNORMAL /HPF
NRBC BLD AUTO-RTO: 0 /100 WBCS
PH UR STRIP.AUTO: 5.5 [PH]
PLATELET # BLD AUTO: 383 THOUSANDS/UL (ref 149–390)
PMV BLD AUTO: 9.7 FL (ref 8.9–12.7)
POTASSIUM SERPL-SCNC: 4 MMOL/L (ref 3.5–5.3)
PROT UR STRIP-MCNC: NEGATIVE MG/DL
PROTHROMBIN TIME: 13 SECONDS (ref 11.6–14.5)
RBC # BLD AUTO: 4.63 MILLION/UL (ref 3.81–5.12)
RBC #/AREA URNS AUTO: ABNORMAL /HPF
RSV RNA RESP QL NAA+PROBE: NEGATIVE
SARS-COV-2 RNA RESP QL NAA+PROBE: NEGATIVE
SODIUM SERPL-SCNC: 135 MMOL/L (ref 135–147)
SP GR UR STRIP.AUTO: >=1.03
UROBILINOGEN UR QL STRIP.AUTO: 0.2 E.U./DL
WBC # BLD AUTO: 14.86 THOUSAND/UL (ref 4.31–10.16)
WBC #/AREA URNS AUTO: ABNORMAL /HPF

## 2023-10-12 PROCEDURE — 85025 COMPLETE CBC W/AUTO DIFF WBC: CPT | Performed by: EMERGENCY MEDICINE

## 2023-10-12 PROCEDURE — 36415 COLL VENOUS BLD VENIPUNCTURE: CPT | Performed by: EMERGENCY MEDICINE

## 2023-10-12 PROCEDURE — 81001 URINALYSIS AUTO W/SCOPE: CPT | Performed by: EMERGENCY MEDICINE

## 2023-10-12 PROCEDURE — 99285 EMERGENCY DEPT VISIT HI MDM: CPT

## 2023-10-12 PROCEDURE — 85730 THROMBOPLASTIN TIME PARTIAL: CPT | Performed by: EMERGENCY MEDICINE

## 2023-10-12 PROCEDURE — 71045 X-RAY EXAM CHEST 1 VIEW: CPT

## 2023-10-12 PROCEDURE — 84484 ASSAY OF TROPONIN QUANT: CPT | Performed by: EMERGENCY MEDICINE

## 2023-10-12 PROCEDURE — 96361 HYDRATE IV INFUSION ADD-ON: CPT

## 2023-10-12 PROCEDURE — 99285 EMERGENCY DEPT VISIT HI MDM: CPT | Performed by: EMERGENCY MEDICINE

## 2023-10-12 PROCEDURE — 96374 THER/PROPH/DIAG INJ IV PUSH: CPT

## 2023-10-12 PROCEDURE — 85610 PROTHROMBIN TIME: CPT | Performed by: EMERGENCY MEDICINE

## 2023-10-12 PROCEDURE — 0241U HB NFCT DS VIR RESP RNA 4 TRGT: CPT | Performed by: EMERGENCY MEDICINE

## 2023-10-12 PROCEDURE — 83605 ASSAY OF LACTIC ACID: CPT | Performed by: EMERGENCY MEDICINE

## 2023-10-12 PROCEDURE — 80048 BASIC METABOLIC PNL TOTAL CA: CPT | Performed by: EMERGENCY MEDICINE

## 2023-10-12 PROCEDURE — 85379 FIBRIN DEGRADATION QUANT: CPT | Performed by: EMERGENCY MEDICINE

## 2023-10-12 PROCEDURE — 93005 ELECTROCARDIOGRAM TRACING: CPT

## 2023-10-12 PROCEDURE — 87040 BLOOD CULTURE FOR BACTERIA: CPT | Performed by: EMERGENCY MEDICINE

## 2023-10-12 PROCEDURE — 81025 URINE PREGNANCY TEST: CPT | Performed by: EMERGENCY MEDICINE

## 2023-10-12 RX ORDER — BENZONATATE 100 MG/1
100 CAPSULE ORAL ONCE
Status: COMPLETED | OUTPATIENT
Start: 2023-10-12 | End: 2023-10-12

## 2023-10-12 RX ORDER — KETOROLAC TROMETHAMINE 30 MG/ML
15 INJECTION, SOLUTION INTRAMUSCULAR; INTRAVENOUS ONCE
Status: COMPLETED | OUTPATIENT
Start: 2023-10-12 | End: 2023-10-12

## 2023-10-12 RX ORDER — ALBUTEROL SULFATE 90 UG/1
2 AEROSOL, METERED RESPIRATORY (INHALATION) ONCE
Status: COMPLETED | OUTPATIENT
Start: 2023-10-12 | End: 2023-10-12

## 2023-10-12 RX ORDER — BENZONATATE 100 MG/1
100 CAPSULE ORAL 3 TIMES DAILY PRN
Qty: 21 CAPSULE | Refills: 0 | Status: SHIPPED | OUTPATIENT
Start: 2023-10-12

## 2023-10-12 RX ADMIN — BENZONATATE 100 MG: 100 CAPSULE ORAL at 23:07

## 2023-10-12 RX ADMIN — KETOROLAC TROMETHAMINE 15 MG: 30 INJECTION, SOLUTION INTRAMUSCULAR; INTRAVENOUS at 22:36

## 2023-10-12 RX ADMIN — ALBUTEROL SULFATE 2 PUFF: 90 AEROSOL, METERED RESPIRATORY (INHALATION) at 21:50

## 2023-10-12 RX ADMIN — SODIUM CHLORIDE 1000 ML: 0.9 INJECTION, SOLUTION INTRAVENOUS at 22:11

## 2023-10-12 NOTE — Clinical Note
Elba Stapleton was seen and treated in our emergency department on 10/12/2023. No restrictions            Diagnosis:     Rebecca  . She may return on this date: 10/14/2023         If you have any questions or concerns, please don't hesitate to call.       Kimberly Kat, DO    ______________________________           _______________          _______________  Hospital Representative                              Date                                Time

## 2023-10-13 LAB
ATRIAL RATE: 107 BPM
P AXIS: 59 DEGREES
PR INTERVAL: 144 MS
QRS AXIS: 32 DEGREES
QRSD INTERVAL: 70 MS
QT INTERVAL: 328 MS
QTC INTERVAL: 437 MS
T WAVE AXIS: 53 DEGREES
VENTRICULAR RATE: 107 BPM

## 2023-10-13 PROCEDURE — 93010 ELECTROCARDIOGRAM REPORT: CPT | Performed by: INTERNAL MEDICINE

## 2023-10-13 NOTE — DISCHARGE INSTRUCTIONS
Take Motrin and Tylenol as needed for fever. Take albuterol every 4-6 hours as needed for coughing and wheezing. If symptoms worsen or if new symptoms develop you should return to the emergency department for further care.

## 2023-10-15 LAB
BACTERIA BLD CULT: NORMAL
BACTERIA BLD CULT: NORMAL

## 2023-10-18 LAB
BACTERIA BLD CULT: NORMAL
BACTERIA BLD CULT: NORMAL

## 2023-11-03 ENCOUNTER — HOSPITAL ENCOUNTER (EMERGENCY)
Facility: HOSPITAL | Age: 33
Discharge: HOME | End: 2023-11-03
Attending: EMERGENCY MEDICINE
Payer: COMMERCIAL

## 2023-11-03 ENCOUNTER — APPOINTMENT (EMERGENCY)
Dept: ULTRASOUND IMAGING | Facility: HOSPITAL | Age: 33
End: 2023-11-03
Payer: COMMERCIAL

## 2023-11-03 ENCOUNTER — APPOINTMENT (EMERGENCY)
Dept: CT IMAGING | Facility: HOSPITAL | Age: 33
End: 2023-11-03
Payer: COMMERCIAL

## 2023-11-03 VITALS
SYSTOLIC BLOOD PRESSURE: 129 MMHG | OXYGEN SATURATION: 98 % | TEMPERATURE: 98.7 F | DIASTOLIC BLOOD PRESSURE: 62 MMHG | RESPIRATION RATE: 20 BRPM | HEART RATE: 83 BPM

## 2023-11-03 DIAGNOSIS — Z34.90 PREGNANCY: ICD-10-CM

## 2023-11-03 DIAGNOSIS — Z86.711 HISTORY OF PULMONARY EMBOLUS (PE): ICD-10-CM

## 2023-11-03 DIAGNOSIS — M89.8X1 PAIN IN SCAPULA: Primary | ICD-10-CM

## 2023-11-03 LAB
ABO GROUP BLD: NORMAL
ALBUMIN SERPL BCP-MCNC: 4.1 G/DL (ref 3.5–5)
ALP SERPL-CCNC: 56 U/L (ref 34–104)
ALT SERPL W P-5'-P-CCNC: 10 U/L (ref 7–52)
ANION GAP SERPL CALCULATED.3IONS-SCNC: 9 MMOL/L
AST SERPL W P-5'-P-CCNC: 10 U/L (ref 13–39)
B-HCG SERPL-ACNC: ABNORMAL MIU/ML (ref 0–5)
BASOPHILS # BLD AUTO: 0.05 THOUSANDS/ÂΜL (ref 0–0.1)
BASOPHILS NFR BLD AUTO: 1 % (ref 0–1)
BILIRUB SERPL-MCNC: 0.26 MG/DL (ref 0.2–1)
BILIRUB UR QL STRIP: NEGATIVE
BUN SERPL-MCNC: 15 MG/DL (ref 5–25)
CALCIUM SERPL-MCNC: 8.9 MG/DL (ref 8.4–10.2)
CARDIAC TROPONIN I PNL SERPL HS: <2 NG/L
CARDIAC TROPONIN I PNL SERPL HS: <2 NG/L
CHLORIDE SERPL-SCNC: 104 MMOL/L (ref 96–108)
CLARITY UR: NORMAL
CO2 SERPL-SCNC: 22 MMOL/L (ref 21–32)
COLOR UR: NORMAL
CREAT SERPL-MCNC: 0.78 MG/DL (ref 0.6–1.3)
D DIMER PPP FEU-MCNC: 0.57 UG/ML FEU
EOSINOPHIL # BLD AUTO: 0.16 THOUSAND/ÂΜL (ref 0–0.61)
EOSINOPHIL NFR BLD AUTO: 2 % (ref 0–6)
ERYTHROCYTE [DISTWIDTH] IN BLOOD BY AUTOMATED COUNT: 12.4 % (ref 11.6–15.1)
EXT PREGNANCY TEST URINE: POSITIVE
EXT. CONTROL: ABNORMAL
FLUAV RNA RESP QL NAA+PROBE: NEGATIVE
FLUBV RNA RESP QL NAA+PROBE: NEGATIVE
GFR SERPL CREATININE-BSD FRML MDRD: 102 ML/MIN/1.73SQ M
GLUCOSE SERPL-MCNC: 98 MG/DL (ref 65–140)
GLUCOSE UR STRIP-MCNC: NEGATIVE MG/DL
HCT VFR BLD AUTO: 37.9 % (ref 34.8–46.1)
HGB BLD-MCNC: 12.3 G/DL (ref 11.5–15.4)
HGB UR QL STRIP.AUTO: NEGATIVE
IMM GRANULOCYTES # BLD AUTO: 0.05 THOUSAND/UL (ref 0–0.2)
IMM GRANULOCYTES NFR BLD AUTO: 1 % (ref 0–2)
KETONES UR STRIP-MCNC: NEGATIVE MG/DL
LEUKOCYTE ESTERASE UR QL STRIP: NEGATIVE
LYMPHOCYTES # BLD AUTO: 2.26 THOUSANDS/ÂΜL (ref 0.6–4.47)
LYMPHOCYTES NFR BLD AUTO: 26 % (ref 14–44)
MAGNESIUM SERPL-MCNC: 1.9 MG/DL (ref 1.9–2.7)
MCH RBC QN AUTO: 30.7 PG (ref 26.8–34.3)
MCHC RBC AUTO-ENTMCNC: 32.5 G/DL (ref 31.4–37.4)
MCV RBC AUTO: 95 FL (ref 82–98)
MONOCYTES # BLD AUTO: 0.45 THOUSAND/ÂΜL (ref 0.17–1.22)
MONOCYTES NFR BLD AUTO: 5 % (ref 4–12)
NEUTROPHILS # BLD AUTO: 5.7 THOUSANDS/ÂΜL (ref 1.85–7.62)
NEUTS SEG NFR BLD AUTO: 65 % (ref 43–75)
NITRITE UR QL STRIP: NEGATIVE
NRBC BLD AUTO-RTO: 0 /100 WBCS
PH UR STRIP.AUTO: 5.5 [PH]
PLATELET # BLD AUTO: 304 THOUSANDS/UL (ref 149–390)
PMV BLD AUTO: 9.6 FL (ref 8.9–12.7)
POTASSIUM SERPL-SCNC: 3.8 MMOL/L (ref 3.5–5.3)
PROT SERPL-MCNC: 7.1 G/DL (ref 6.4–8.4)
PROT UR STRIP-MCNC: NEGATIVE MG/DL
RBC # BLD AUTO: 4.01 MILLION/UL (ref 3.81–5.12)
RH BLD: POSITIVE
RSV RNA RESP QL NAA+PROBE: NEGATIVE
SARS-COV-2 RNA RESP QL NAA+PROBE: NEGATIVE
SODIUM SERPL-SCNC: 135 MMOL/L (ref 135–147)
SP GR UR STRIP.AUTO: 1.02
UROBILINOGEN UR QL STRIP.AUTO: 0.2 E.U./DL
WBC # BLD AUTO: 8.67 THOUSAND/UL (ref 4.31–10.16)

## 2023-11-03 PROCEDURE — 83735 ASSAY OF MAGNESIUM: CPT

## 2023-11-03 PROCEDURE — 84484 ASSAY OF TROPONIN QUANT: CPT

## 2023-11-03 PROCEDURE — 99284 EMERGENCY DEPT VISIT MOD MDM: CPT

## 2023-11-03 PROCEDURE — 96372 THER/PROPH/DIAG INJ SC/IM: CPT

## 2023-11-03 PROCEDURE — 85379 FIBRIN DEGRADATION QUANT: CPT

## 2023-11-03 PROCEDURE — 80053 COMPREHEN METABOLIC PANEL: CPT

## 2023-11-03 PROCEDURE — 86900 BLOOD TYPING SEROLOGIC ABO: CPT

## 2023-11-03 PROCEDURE — 86901 BLOOD TYPING SEROLOGIC RH(D): CPT

## 2023-11-03 PROCEDURE — 0241U HB NFCT DS VIR RESP RNA 4 TRGT: CPT

## 2023-11-03 PROCEDURE — 85025 COMPLETE CBC W/AUTO DIFF WBC: CPT

## 2023-11-03 PROCEDURE — 84702 CHORIONIC GONADOTROPIN TEST: CPT

## 2023-11-03 PROCEDURE — 93005 ELECTROCARDIOGRAM TRACING: CPT

## 2023-11-03 PROCEDURE — 71275 CT ANGIOGRAPHY CHEST: CPT

## 2023-11-03 PROCEDURE — 81025 URINE PREGNANCY TEST: CPT

## 2023-11-03 PROCEDURE — 99285 EMERGENCY DEPT VISIT HI MDM: CPT

## 2023-11-03 PROCEDURE — 76801 OB US < 14 WKS SINGLE FETUS: CPT

## 2023-11-03 PROCEDURE — 36415 COLL VENOUS BLD VENIPUNCTURE: CPT

## 2023-11-03 PROCEDURE — 87086 URINE CULTURE/COLONY COUNT: CPT

## 2023-11-03 PROCEDURE — 81003 URINALYSIS AUTO W/O SCOPE: CPT

## 2023-11-03 RX ORDER — ENOXAPARIN SODIUM 100 MG/ML
40 INJECTION SUBCUTANEOUS ONCE
Status: COMPLETED | OUTPATIENT
Start: 2023-11-03 | End: 2023-11-03

## 2023-11-03 RX ORDER — SODIUM CHLORIDE 9 MG/ML
3 INJECTION INTRAVENOUS
Status: DISCONTINUED | OUTPATIENT
Start: 2023-11-03 | End: 2023-11-03 | Stop reason: HOSPADM

## 2023-11-03 RX ORDER — ENOXAPARIN SODIUM 100 MG/ML
40 INJECTION SUBCUTANEOUS DAILY
Qty: 5.6 ML | Refills: 0 | Status: SHIPPED | OUTPATIENT
Start: 2023-11-03 | End: 2023-11-17

## 2023-11-03 RX ADMIN — ENOXAPARIN SODIUM 40 MG: 40 INJECTION SUBCUTANEOUS at 12:56

## 2023-11-03 RX ADMIN — IOHEXOL 85 ML: 350 INJECTION, SOLUTION INTRAVENOUS at 11:35

## 2023-11-03 NOTE — DISCHARGE INSTRUCTIONS
US OB < 14 weeks with transvaginal: Single live intrauterine gestation with a measured crown rump length corresponding to an estimated gestational age of 10 weeks and 6 days. This is concordant with the projected gestational age of 7 weeks and 3 days based on the provided last menstrual , period. Therefore, the best average ultrasound age is 6 weeks and 3 days. Estimated due date is June 25, 2024.,   Small subchorionic hemorrhage.,   A 4.0 cm right ovarian dermoid. Recommend annual follow-up pelvic ultrasound in 12 months.,   A 1.9 cm intramural uterine fibroid. , The study was marked in [facility] for immediate notification. ,   Start taking prenatals if you haven't. Take Lovenox once daily. Make sure to follow-up with your OB in the next 2 weeks. Return for worsening of symptoms.

## 2023-11-03 NOTE — ED NOTES
Patient aware of need for urine sample. Call bell within reach.      Ritesh Cheney RN  11/03/23 [id number]

## 2023-11-03 NOTE — ED PROVIDER NOTES
History  Chief Complaint   Patient presents with    Back Pain     Mid upper back pain radiating to right arm and shoulder. History of Pulmonary embolism and symptoms are similar     Pt is a 42-year-old female who states approximately 6 weeks pregnant arriving for evaluation of right scapula pain that radiates upwards. She states that this is exactly how she felt when she had a PE in the past.  Patient states that the PE was related to pregnancy, and she has not been on anticoagulation. Patient states that she does not have shortness of breath, no dyspnea on exertion. Patient states that pain is positional, but is not reproducible. Patient has no pain with range of motion of her right shoulder. Patient reports that she is approximately 6 weeks pregnant, last menstrual period was . Patient states that she is a  7, para 5, with 1 miscarriage approximately 2 months ago in August.  She denies any syncope, near syncope. Patient denies any substernal chest pain. Patient states that she initially thought it was a muscle strain, but pain continues and is worsening. Denies any lower abdominal pain, any upper abdominal pain, any vaginal discharge, any vaginal bleeding. Patient states that she had a URI, with cough 3 weeks ago that has improved, denies any fevers or chills. Prior to Admission Medications   Prescriptions Last Dose Informant Patient Reported? Taking?    PARoxetine (PAXIL) 40 MG tablet   No No   Sig: Take 1 tablet (40 mg total) by mouth every morning   Patient not taking: Reported on 2023   albuterol (ProAir HFA) 90 mcg/act inhaler   No No   Sig: Inhale 2 puffs every 6 (six) hours as needed for wheezing or shortness of breath   Patient not taking: Reported on 2023   benzonatate (TESSALON PERLES) 100 mg capsule   No No   Sig: Take 1 capsule (100 mg total) by mouth 3 (three) times a day as needed for cough   clonazePAM (KlonoPIN) 0.5 mg tablet   No No   Sig: Take 1 tablet (0.5 mg total) by mouth 2 (two) times a day As needed   Patient not taking: Reported on 2023   ergocalciferol (VITAMIN D2) 50,000 units   No No   Sig: Take 1 capsule (50,000 Units total) by mouth once a week   Patient not taking: Reported on 2023   guaifenesin-codeine (GUAIFENESIN AC) 100-10 MG/5ML liquid   No No   Sig: Take 5 mL by mouth 3 (three) times a day as needed for cough   methylPREDNISolone 4 MG tablet therapy pack   No No   Sig: Use as directed on package   metoprolol tartrate (LOPRESSOR) 25 mg tablet   No No   Sig: Take 1 tablet (25 mg total) by mouth every 12 (twelve) hours   Patient not taking: Reported on 2023   polymyxin b-trimethoprim (POLYTRIM) ophthalmic solution   No No   Sig: Administer 1 drop to both eyes every 4 (four) hours X 5 days   Patient not taking: Reported on 2023   rizatriptan (MAXALT) 10 mg tablet   No No   Sig: Take 1 tablet (10 mg total) by mouth as needed for migraine Take at the onset of migraine; if symptoms continue or return, may take another dose at least 2 hours after first dose. Take no more than 2 doses in a day. Patient not taking: Reported on 2023      Facility-Administered Medications: None       Past Medical History:   Diagnosis Date    Irritable bowel syndrome     last assessed: 10/23/2015    Maternal obesity, antepartum     last assessed: 2017    Migraine        Past Surgical History:   Procedure Laterality Date     SECTION      FOOT SURGERY         Family History   Problem Relation Age of Onset    Irritable bowel syndrome Mother     Ovarian cancer Family     Ovarian cancer Family      I have reviewed and agree with the history as documented. E-Cigarette/Vaping    E-Cigarette Use Never User      E-Cigarette/Vaping Substances     Social History     Tobacco Use    Smoking status: Never    Smokeless tobacco: Never   Vaping Use    Vaping Use: Never used   Substance Use Topics    Alcohol use: No    Drug use:  No Review of Systems   Constitutional: Negative. HENT: Negative. Eyes: Negative. Respiratory: Negative. Negative for shortness of breath. Cardiovascular: Negative. Gastrointestinal: Negative. Endocrine: Negative. Genitourinary: Negative. Musculoskeletal: Negative. Skin: Negative. Allergic/Immunologic: Negative. Neurological: Negative. Hematological: Negative. Psychiatric/Behavioral: Negative. All other systems reviewed and are negative. Physical Exam  Physical Exam  Vitals and nursing note reviewed. Constitutional:       General: She is not in acute distress. Appearance: Normal appearance. She is normal weight. She is not ill-appearing or toxic-appearing. HENT:      Head: Normocephalic and atraumatic. Right Ear: External ear normal.      Left Ear: External ear normal.      Nose: Nose normal.      Mouth/Throat:      Mouth: Mucous membranes are moist.      Pharynx: Oropharynx is clear. Eyes:      Extraocular Movements: Extraocular movements intact. Conjunctiva/sclera: Conjunctivae normal.      Pupils: Pupils are equal, round, and reactive to light. Cardiovascular:      Rate and Rhythm: Normal rate and regular rhythm. Pulses: Normal pulses. Heart sounds: Normal heart sounds. Pulmonary:      Effort: Pulmonary effort is normal. No respiratory distress. Breath sounds: Normal breath sounds. No stridor. No wheezing, rhonchi or rales. Chest:      Chest wall: No tenderness. Abdominal:      General: Abdomen is flat. Bowel sounds are normal.      Palpations: Abdomen is soft. Musculoskeletal:         General: No tenderness or signs of injury. Cervical back: Normal range of motion and neck supple. Back:    Skin:     General: Skin is warm. Capillary Refill: Capillary refill takes less than 2 seconds. Neurological:      General: No focal deficit present.       Mental Status: She is alert and oriented to person, place, and time. Mental status is at baseline. Psychiatric:         Mood and Affect: Mood normal.         Behavior: Behavior normal.         Thought Content: Thought content normal.         Judgment: Judgment normal.         Vital Signs  ED Triage Vitals   Temperature Pulse Respirations Blood Pressure SpO2   11/03/23 0908 11/03/23 0908 11/03/23 0909 11/03/23 0909 11/03/23 0909   98.4 °F (36.9 °C) 83 16 136/74 98 %      Temp Source Heart Rate Source Patient Position - Orthostatic VS BP Location FiO2 (%)   11/03/23 0908 11/03/23 0908 11/03/23 0909 11/03/23 0909 --   Tympanic Monitor Sitting Left arm       Pain Score       11/03/23 0909       5           Vitals:    11/03/23 0908 11/03/23 0909 11/03/23 1143 11/03/23 1230   BP:  136/74 133/73 129/62   Pulse: 83 78 83 83   Patient Position - Orthostatic VS:  Sitting           Visual Acuity      ED Medications  Medications   iohexol (OMNIPAQUE) 350 MG/ML injection (MULTI-DOSE) 85 mL (85 mL Intravenous Given 11/3/23 1135)   enoxaparin (LOVENOX) subcutaneous injection 40 mg (40 mg Subcutaneous Given 11/3/23 1256)       Diagnostic Studies  Results Reviewed       Procedure Component Value Units Date/Time    HS Troponin I 2hr [[id number]] Collected: 11/03/23 1137    Lab Status: Final result Specimen: Blood from Arm, Right Updated: 11/03/23 1210     hs TnI 2hr <2 ng/L      Delta 2hr hsTnI --    UA w Reflex to Microscopic w Reflex to Culture [[id number]] Collected: 11/03/23 1137    Lab Status: Final result Specimen: Urine, Clean Catch Updated: 11/03/23 1144     Color, UA Straw     Clarity, UA Hazy     Specific Gravity, UA 1.020     pH, UA 5.5     Leukocytes, UA Negative     Nitrite, UA Negative     Protein, UA Negative mg/dl      Glucose, UA Negative mg/dl      Ketones, UA Negative mg/dl      Urobilinogen, UA 0.2 E.U./dl      Bilirubin, UA Negative     Occult Blood, UA Negative     URINE COMMENT --    Urine culture [[id number]] Collected: 11/03/23 1137    Lab Status:  In process Specimen: Urine, Clean Catch Updated: 11/03/23 1144    POCT pregnancy, urine [[id number]]  (Abnormal) Resulted: 11/03/23 1137    Lab Status: Final result Updated: 11/03/23 1137     EXT Preg Test, Ur Positive     Control Valid    Comprehensive metabolic panel [[id number]]  (Abnormal) Collected: 11/03/23 0939    Lab Status: Final result Specimen: Blood from Arm, Right Updated: 11/03/23 1038     Sodium 135 mmol/L      Potassium 3.8 mmol/L      Chloride 104 mmol/L      CO2 22 mmol/L      ANION GAP 9 mmol/L      BUN 15 mg/dL      Creatinine 0.78 mg/dL      Glucose 98 mg/dL      Calcium 8.9 mg/dL      AST 10 U/L      ALT 10 U/L      Alkaline Phosphatase 56 U/L      Total Protein 7.1 g/dL      Albumin 4.1 g/dL      Total Bilirubin 0.26 mg/dL      eGFR 102 ml/min/1.73sq m     Narrative:      [facility] guidelines for Chronic Kidney Disease (CKD):     Stage 1 with normal or high GFR (GFR > 90 mL/min/1.73 square meters)    Stage 2 Mild CKD (GFR = 60-89 mL/min/1.73 square meters)    Stage 3A Moderate CKD (GFR = 45-59 mL/min/1.73 square meters)    Stage 3B Moderate CKD (GFR = 30-44 mL/min/1.73 square meters)    Stage 4 Severe CKD (GFR = 15-29 mL/min/1.73 square meters)    Stage 5 End Stage CKD (GFR <15 mL/min/1.73 square meters)  Note: GFR calculation is accurate only with a steady state creatinine    Magnesium [[id number]]  (Normal) Collected: 11/03/23 0939    Lab Status: Final result Specimen: Blood from Arm, Right Updated: 11/03/23 1038     Magnesium 1.9 mg/dL     hCG, quantitative [[id number]]  (Abnormal) Collected: 11/03/23 0939    Lab Status: Final result Specimen: Blood from Arm, Right Updated: 11/03/23 1038     HCG, Quant 10,612 mIU/mL     Narrative:       Expected Ranges:    HCG results between 5 and 25 mIU/mL may be indicative of early pregnancy but should be interpreted in light of the total clinical presentation. HCG can rise to detectable levels in jacy and post menopausal women (0-11.6 mIU/mL). Approximate               Approximate HCG  Gestation age          Concentration ( mIU/mL)  _____________          ______________________   Laura Recinos                      HCG values  0.2-1                       5-50  1-2                           2-3                         100-5000  3-4                         500-06862  4-5                         1000-70381  5-6                         64899-144951  6-8                         49943-997998  8-12                        [id number]      FLU/RSV/COVID - if FLU/RSV clinically relevant [[id number]]  (Normal) Collected: 11/03/23 0939    Lab Status: Final result Specimen: Nares from Nose Updated: 11/03/23 1027     SARS-CoV-2 Negative     INFLUENZA A PCR Negative     INFLUENZA B PCR Negative     RSV PCR Negative    Narrative:      FOR PEDIATRIC PATIENTS - copy/paste COVID Guidelines URL to browser: [email or web address]    SARS-CoV-2 assay is a Nucleic Acid Amplification assay intended for the  qualitative detection of nucleic acid from SARS-CoV-2 in nasopharyngeal  swabs. Results are for the presumptive identification of SARS-CoV-2 RNA. Positive results are indicative of infection with SARS-CoV-2, the virus  causing COVID-19, but do not rule out bacterial infection or co-infection  with other viruses. Laboratories within the [facility] and its  territories are required to report all positive results to the appropriate  public health authorities. Negative results do not preclude SARS-CoV-2  infection and should not be used as the sole basis for treatment or other  patient management decisions. Negative results must be combined with  clinical observations, patient history, and epidemiological information. This test has not been FDA cleared or approved. This test has been authorized by FDA under an Emergency Use Authorization  (EUA).  This test is only authorized for the duration of time the  declaration that circumstances exist justifying the authorization of the  emergency use of an in vitro diagnostic tests for detection of SARS-CoV-2  virus and/or diagnosis of COVID-19 infection under section 564(b)(1) of  the Act, 21 U. S.C. 025PHV-2(O)(3), unless the authorization is terminated  or revoked sooner. The test has been validated but independent review by FDA  and CLIA is pending. Test performed using [employer] GeneXpert: This RT-PCR assay targets N2,  a region unique to SARS-CoV-2. A conserved region in the E-gene was chosen  for pan-Sarbecovirus detection which includes SARS-CoV-2. According to CMS-2020-01-R, this platform meets the definition of high-throughput technology.     HS Troponin 0hr (reflex protocol) [[id number]]  (Normal) Collected: 11/03/23 0939    Lab Status: Final result Specimen: Blood from Arm, Right Updated: 11/03/23 1018     hs TnI 0hr <2 ng/L     D-dimer, quantitative [[id number]]  (Abnormal) Collected: 11/03/23 0939    Lab Status: Final result Specimen: Blood from Arm, Right Updated: 11/03/23 1018     D-Dimer, Quant 0.57 ug/ml FEU     CBC and differential [[id number]] Collected: 11/03/23 0939    Lab Status: Final result Specimen: Blood from Arm, Right Updated: 11/03/23 0956     WBC 8.67 Thousand/uL      RBC 4.01 Million/uL      Hemoglobin 12.3 g/dL      Hematocrit 37.9 %      MCV 95 fL      MCH 30.7 pg      MCHC 32.5 g/dL      RDW 12.4 %      MPV 9.6 fL      Platelets 424 Thousands/uL      nRBC 0 /100 WBCs      Neutrophils Relative 65 %      Immat GRANS % 1 %      Lymphocytes Relative 26 %      Monocytes Relative 5 %      Eosinophils Relative 2 %      Basophils Relative 1 %      Neutrophils Absolute 5.70 Thousands/µL      Immature Grans Absolute 0.05 Thousand/uL      Lymphocytes Absolute 2.26 Thousands/µL      Monocytes Absolute 0.45 Thousand/µL      Eosinophils Absolute 0.16 Thousand/µL      Basophils Absolute 0.05 Thousands/µL                    CTA ED chest PE Study   Final Result by Joi Brewer Thierno Glynn MD (11/03 1213)      No acute pulmonary embolism. Workstation performed: [id number]         US OB < 14 weeks with transvaginal   Final Result by Yanely Erickson MD (11/03 1144)      Single live intrauterine gestation with a measured crown rump length corresponding to an estimated gestational age of 10 weeks and 6 days. This is concordant with the projected gestational age of 7 weeks and 3 days based on the provided last menstrual    period. Therefore, the best average ultrasound age is 6 weeks and 3 days. Estimated due date is June 25, 2024. Small subchorionic hemorrhage. A 4.0 cm right ovarian dermoid. Recommend annual follow-up pelvic ultrasound in 12 months. A 1.9 cm intramural uterine fibroid. The study was marked in [facility] for immediate notification.       Workstation performed: [id number]                    Procedures  ECG 12 Lead Documentation Only    Date/Time: 11/3/2023 9:36 AM    Performed by: TAHIR Montague  Authorized by: TAHIR Montague    Indications / Diagnosis:  Right scapula pain, hx of PE  ECG reviewed by me, the ED Provider: yes    Patient location:  ED  Interpretation:     Interpretation: normal    Rate:     ECG rate:  75    ECG rate assessment: normal    Rhythm:     Rhythm: sinus rhythm    Ectopy:     Ectopy: none    QRS:     QRS axis:  Normal  Conduction:     Conduction: normal    ST segments:     ST segments:  Normal  T waves:     T waves: normal    ECG 12 Lead Documentation Only    Date/Time: 11/3/2023 11:40 AM    Performed by: TAHIR Montague  Authorized by: TAHIR Montague    Indications / Diagnosis:  Scapula pain  ECG reviewed by me, the ED Provider: yes    Patient location:  ED  Interpretation:     Interpretation: normal    Rate:     ECG rate:  92    ECG rate assessment: normal    Rhythm:     Rhythm: sinus rhythm    Ectopy:     Ectopy: none    QRS:     QRS axis:  Normal  Conduction:     Conduction: normal    ST segments:     ST segments:  Normal  T waves:     T waves: normal             ED Course  ED Course as of 23 1730      0959 CBC and differential  No leukocytosis, no anemia   1020 D-dimer, quantitative(!)   1022 hs TnI 0hr: <2  Pain has been ongoing for days. No further troponins required per [facility] ACS algorithm. 1139 PREGNANCY TEST URINE(!): Positive   1139 Rh Factor: Positive   1226 After discussing patient is not on PE prophylaxis, [facility]-OB GYN- SOD Attending ([facility]))  [address] she needs to go back on lovenox immediately to prevent a pe in this pregnancy  12:26 PM  20 days left   1228 [facility]-OB GYN- SOD Attending ([facility]))  HARRY  Yes please if you can write a script for lovenox 40 mg once daily give her a 2 week script and have her follow up with her OB outpatient. Thank you. z  12:28 PM  20 days left   1241 Confirmed with Dr. Lesley Whiting, lovenox is appropriate in setting of subchorionic hemorrhage, or intramural uterine fibroid. HEART Risk Score      Flowsheet Row Most Recent Value   Heart Score Risk Calculator    History 0 Filed at: 2023 1200   ECG 0 Filed at: 2023 1200   Age 0 Filed at: 2023 1200   Risk Factors 0 Filed at: 2023 1200   Troponin 0 Filed at: 2023 1200   HEART Score 0 Filed at: 2023 1200                                        Medical Decision Making  Differential diagnosis including PE, musculoskeletal pain, dysrhythmia,   Patient is a 82-year-old female,  9, para 5 with a recent miscarriage arriving for evaluation of right scapula pain that is similar to PE in the past.  Patient states that her pain was attributed muscle strain. Pt is not currently on anticoagulation as her PE was attributed to pregnancy, and was d/c'd.   Patient does not have a confirmed IUP, denies all pregnancy related complaints, no lower abdominal pain, no vaginal bleeding, no vaginal discharge. However patient does not have a confirmed IUP, therefore will get ultrasound, check cardiac work-up, pregnancy work-up, D-dimer. Patient's D-dimer is positive. Discussed with patient at length the risk versus benefit of receiving a CT PE scan. Patient reports that she has had similar conversations in the past.  Discussed that this is radiation, however with out diagnostics, without cannot be completely ruled out. Reviewed radiation is involved, and as she is pregnant there is risk, albeit limited, to fetal development. Patient states she understands and would like CT scan given that she has not been on PE prophylaxis yet this pregnancy and sensation that this is scapular pain is similar to prior. With joint decision making patient agreeing to CT PE scan. Patient has no acute PE. As documented reviewed U/S finding and discussed patient with Dr. Lorenzo Byrnes with recommendation appreciated to start lovenox. Discussed this with patient as well and she states she was willing, and expected to start after initial phone call as this recommendation is concordant with her OB's previous statements telling her that if she ever becomes pregnant she will need to go immediately PE prophylaxis. Patient states she does not need retraining for lovenox. Discussed additional findings of the U/S, small subchorionic hemorrhage, 4.0 right ovarian dermoid with recommendation for follow-up in 12 months, and a 1.9 cm intramural uterine fibroid. Patient aware to follow up with her OB for this as well. Patient states she was aware of dermoid. Patient made aware that without appropriate follow up diagnosis such as but not limited to cancer can be missed. Discussed strict return precautions to any ED. Discussed the importance of follow up. [facility]ided [facility]. Patient is aware to keep her follow up with her OB and call and specifically speak with the Triage Nurse to ensure appropriate follow up.    Patient has a heart score of 0. Patient's pain believed to be non-cardiac in origin. Patient had two negative troponins with two NSR ekgs. Patient stable for follow up with PCP. Reviewed reasons to return to ed. Patient verbalized understanding of diagnosis and agreement with discharge plan of care as well as understanding of reasons to return to ed. Amount and/or Complexity of Data Reviewed  Labs: ordered. Decision-making details documented in ED Course. Radiology: ordered. Risk  Prescription drug management. Disposition  Final diagnoses:   Pain in scapula   History of pulmonary embolus (PE)   Pregnancy     Time reflects when diagnosis was documented in both MDM as applicable and the Disposition within this note       Time User Action Codes Description Comment    11/3/2023 12:42 PM Nolan Elliott Add [K44.8J3] Pain in scapula     11/3/2023 12:46 PM Nolan Elliott Add [S99.655] History of pulmonary embolus (PE)     11/3/2023 12:46 PM Nolan Elliott Add [Z34.90] Pregnancy           ED Disposition       ED Disposition   Discharge    Condition   Stable    Date/Time   Fri Nov 3, 2023 1242    Comment   Rebecca Erickson discharge to home/self care.                    Follow-up Information       Follow up With Specialties Details Why Contact Info Additional [address] [facility] Obstetrics and Gynecology Schedule an appointment as soon as possible for a visit in 2 days For further evaluation of symptoms [address] [phone]  [address] [facility], [address] [facility], [address] [facility],[address]Th Floor, [facility] Emergency Department Emergency Medicine Go to  If symptoms worsen [id number] [facility] [place] [id number] [phone] [address] [facility] Emergency Department, [address] [facility], [address] [facility]            Discharge Medication List as of 11/3/2023 12:49 PM        START taking these medications    Details   enoxaparin (Lovenox) 40 mg/0.4 mL Inject 0.4 mL (40 mg total) under the skin in the morning for 14 days, Starting Fri 11/3/2023, Until Fri 11/17/2023, Normal           CONTINUE these medications which have NOT CHANGED    Details   albuterol (ProAir HFA) 90 mcg/act inhaler Inhale 2 puffs every 6 (six) hours as needed for wheezing or shortness of breath, Starting Fri 11/18/2022, Normal      benzonatate (TESSALON PERLES) 100 mg capsule Take 1 capsule (100 mg total) by mouth 3 (three) times a day as needed for cough, Starting Thu 10/12/2023, Normal      clonazePAM (KlonoPIN) 0.5 mg tablet Take 1 tablet (0.5 mg total) by mouth 2 (two) times a day As needed, Starting Thu 4/13/2023, Normal      ergocalciferol (VITAMIN D2) 50,000 units Take 1 capsule (50,000 Units total) by mouth once a week, Starting Thu 4/13/2023, Normal      guaifenesin-codeine (GUAIFENESIN AC) 100-10 MG/5ML liquid Take 5 mL by mouth 3 (three) times a day as needed for cough, Starting Mon 10/2/2023, Normal      methylPREDNISolone 4 MG tablet therapy pack Use as directed on package, Normal      metoprolol tartrate (LOPRESSOR) 25 mg tablet Take 1 tablet (25 mg total) by mouth every 12 (twelve) hours, Starting Thu 4/13/2023, Normal      PARoxetine (PAXIL) 40 MG tablet Take 1 tablet (40 mg total) by mouth every morning, Starting Thu 4/13/2023, Normal      polymyxin b-trimethoprim (POLYTRIM) ophthalmic solution Administer 1 drop to both eyes every 4 (four) hours X 5 days, Starting Wed 7/12/2023, Normal      rizatriptan (MAXALT) 10 mg tablet Take 1 tablet (10 mg total) by mouth as needed for migraine Take at the onset of migraine; if symptoms continue or return, may take another dose at least 2 hours after first dose. Take no more than 2 doses in a day., Starting Thu 7/28/2022, Normal             No discharge procedures on file.     PDMP Review         Value Time User    PDMP Reviewed  Yes 4/13/2023  3:25 PM [address] [facility], [address] [facility]            ED Provider  Electronically Signed by             TAHIR Frederick  11/03/23 Jerome Rios  11/03/23 5723

## 2023-11-04 LAB
ATRIAL RATE: 75 BPM
ATRIAL RATE: 92 BPM
BACTERIA UR CULT: NORMAL
P AXIS: 57 DEGREES
P AXIS: 71 DEGREES
PR INTERVAL: 140 MS
PR INTERVAL: 158 MS
QRS AXIS: 60 DEGREES
QRS AXIS: 65 DEGREES
QRSD INTERVAL: 70 MS
QRSD INTERVAL: 74 MS
QT INTERVAL: 358 MS
QT INTERVAL: 376 MS
QTC INTERVAL: 419 MS
QTC INTERVAL: 442 MS
T WAVE AXIS: 54 DEGREES
T WAVE AXIS: 67 DEGREES
VENTRICULAR RATE: 75 BPM
VENTRICULAR RATE: 92 BPM

## 2023-11-04 PROCEDURE — 93010 ELECTROCARDIOGRAM REPORT: CPT | Performed by: INTERNAL MEDICINE

## 2024-02-28 ENCOUNTER — OFFICE VISIT (OUTPATIENT)
Dept: URGENT CARE | Facility: CLINIC | Age: 34
End: 2024-02-28
Payer: COMMERCIAL

## 2024-02-28 VITALS
SYSTOLIC BLOOD PRESSURE: 125 MMHG | HEART RATE: 122 BPM | TEMPERATURE: 97.7 F | DIASTOLIC BLOOD PRESSURE: 75 MMHG | OXYGEN SATURATION: 95 % | RESPIRATION RATE: 18 BRPM

## 2024-02-28 DIAGNOSIS — J02.9 SORE THROAT: ICD-10-CM

## 2024-02-28 DIAGNOSIS — J06.9 VIRAL URI WITH COUGH: Primary | ICD-10-CM

## 2024-02-28 LAB — S PYO AG THROAT QL: NEGATIVE

## 2024-02-28 PROCEDURE — 99213 OFFICE O/P EST LOW 20 MIN: CPT

## 2024-02-28 PROCEDURE — 87147 CULTURE TYPE IMMUNOLOGIC: CPT

## 2024-02-28 PROCEDURE — 87636 SARSCOV2 & INF A&B AMP PRB: CPT

## 2024-02-28 PROCEDURE — 87070 CULTURE OTHR SPECIMN AEROBIC: CPT

## 2024-02-28 NOTE — PROGRESS NOTES
[facility]        NAME: Rebecca Erickson is a 33 y.o. female  : 1990    MRN: [id number]  DATE: 2024  TIME: 10:03 AM    Assessment and Plan   Viral URI with cough [J06.9]  1. Viral URI with cough  POCT rapid strepA    Throat culture    Covid/Flu- Office Collect Normal    Covid/Flu- Office Collect Normal    Throat culture      2. Sore throat            Approximately 23 weeks pregnant  Has taken Keflex in the past.    Patient Instructions   Strep A test was negative in the office today, we are sending it for culture, it takes approximately 48-72 hours before we have results as sufficient time is needed for bacteria to grow. You can monitor for results on your Mychart. If it returns positive, I will call in medication for you and also call you.  COVID/Flu test sent out - results will be in your mychart. Due to you currently being pregnant, there are no documented safe antiviral interventions I would prescribe at this time. This test is more so we know whether you have COVID/Flu since it has been going around at your work.  For symptoms:  Warm moist air  Salt water gurgle  Chloraseptic throat spray  Honey  OTC pain medication such as Tylenol  You can take Robitussin for your cough as needed.      Follow up with Primary Care Provider in 3-5 days if not improving.  Proceed to Emergency Department if symptoms worsen.    Chief Complaint     Chief Complaint   Patient presents with   • Sore Throat   • Cough     Started 3 weeks ago          History of Present Illness       Sore throat 5 days ago along with some congestion, has been taking Sudafed. Works at the [employer] and states COVID has been going around however she had a negative COVID test the day her symptoms had begun. Currently about 24 weeks pregnant. Reports she has been having a cough for about 3 weeks.         Review of Systems   Review of Systems   Constitutional:  Negative for chills and fever.   HENT:  Positive for congestion and sore  throat. Negative for ear pain and trouble swallowing (Due to pain).    Eyes:  Negative for pain and visual disturbance.   Respiratory:  Positive for cough. Negative for shortness of breath.    Cardiovascular:  Negative for chest pain and palpitations.   Gastrointestinal:  Negative for abdominal pain and vomiting.   Genitourinary:  Negative for dysuria and hematuria.   Musculoskeletal:  Negative for arthralgias and back pain.   Skin:  Negative for color change and rash.   Neurological:  Negative for seizures and syncope.   All other systems reviewed and are negative.        Current Medications       Current Outpatient Medications:   •  albuterol (ProAir HFA) 90 mcg/act inhaler, Inhale 2 puffs every 6 (six) hours as needed for wheezing or shortness of breath (Patient not taking: Reported on 7/27/2023), Disp: 8.5 g, Rfl: 0  •  benzonatate (TESSALON PERLES) 100 mg capsule, Take 1 capsule (100 mg total) by mouth 3 (three) times a day as needed for cough, Disp: 21 capsule, Rfl: 0  •  clonazePAM (KlonoPIN) 0.5 mg tablet, Take 1 tablet (0.5 mg total) by mouth 2 (two) times a day As needed (Patient not taking: Reported on 7/27/2023), Disp: 60 tablet, Rfl: 0  •  enoxaparin (Lovenox) 40 mg/0.4 mL, Inject 0.4 mL (40 mg total) under the skin in the morning for 14 days, Disp: 5.6 mL, Rfl: 0  •  ergocalciferol (VITAMIN D2) 50,000 units, Take 1 capsule (50,000 Units total) by mouth once a week (Patient not taking: Reported on 7/27/2023), Disp: 4 capsule, Rfl: 0  •  guaifenesin-codeine (GUAIFENESIN AC) 100-10 MG/5ML liquid, Take 5 mL by mouth 3 (three) times a day as needed for cough, Disp: 180 mL, Rfl: 0  •  methylPREDNISolone 4 MG tablet therapy pack, Use as directed on package (Patient not taking: Reported on 2/28/2024), Disp: 21 each, Rfl: 0  •  metoprolol tartrate (LOPRESSOR) 25 mg tablet, Take 1 tablet (25 mg total) by mouth every 12 (twelve) hours (Patient not taking: Reported on 7/27/2023), Disp: 60 tablet, Rfl: 0  •   PARoxetine (PAXIL) 40 MG tablet, Take 1 tablet (40 mg total) by mouth every morning (Patient not taking: Reported on 2023), Disp: 30 tablet, Rfl: 0  •  polymyxin b-trimethoprim (POLYTRIM) ophthalmic solution, Administer 1 drop to both eyes every 4 (four) hours X 5 days (Patient not taking: Reported on 2023), Disp: 10 mL, Rfl: 0  •  rizatriptan (MAXALT) 10 mg tablet, Take 1 tablet (10 mg total) by mouth as needed for migraine Take at the onset of migraine; if symptoms continue or return, may take another dose at least 2 hours after first dose. Take no more than 2 doses in a day. (Patient not taking: Reported on 2023), Disp: 18 tablet, Rfl: 0    Current Allergies     Allergies as of 2024 - Reviewed 2024   Allergen Reaction Noted   • Penicillin g Hives and Diarrhea 2016   • Azithromycin Rash 2020            The following portions of the patient's history were reviewed and updated as appropriate: allergies, current medications, past family history, past medical history, past social history, past surgical history and problem list.     Past Medical History:   Diagnosis Date   • Irritable bowel syndrome     last assessed: 10/23/2015   • Maternal obesity, antepartum     last assessed: 2017   • Migraine        Past Surgical History:   Procedure Laterality Date   •  SECTION     • FOOT SURGERY         Family History   Problem Relation Age of Onset   • Irritable bowel syndrome Mother    • Ovarian cancer Family    • Ovarian cancer Family          Medications have been verified.        Objective   /75   Pulse (!) 122   Temp 97.7 °F (36.5 °C)   Resp 18   LMP 2023 (Exact Date)   SpO2 95%   Patient's last menstrual period was 2023 (exact date).       Physical Exam     Physical Exam  Vitals and nursing note reviewed.   Constitutional:       Appearance: Normal appearance.   HENT:      Head: Normocephalic and atraumatic.      Right Ear: Tympanic membrane  normal.      Left Ear: Tympanic membrane normal.      Nose: Congestion present. No rhinorrhea.      Mouth/Throat:      Mouth: Mucous membranes are moist.      Pharynx: No oropharyngeal exudate or posterior oropharyngeal erythema.   Eyes:      Pupils: Pupils are equal, round, and reactive to light.   Cardiovascular:      Rate and Rhythm: Normal rate.      Pulses: Normal pulses.      Heart sounds: Normal heart sounds.   Pulmonary:      Effort: Pulmonary effort is normal.      Breath sounds: No wheezing, rhonchi or rales.   Skin:     General: Skin is warm and dry.      Capillary Refill: Capillary refill takes less than 2 seconds.   Neurological:      General: No focal deficit present.      Mental Status: She is alert and oriented to person, place, and time. Mental status is at baseline.      Sensory: No sensory deficit.      Motor: No weakness.   Psychiatric:         Mood and Affect: Mood normal.         Behavior: Behavior normal.         Thought Content: Thought content normal.

## 2024-02-28 NOTE — PATIENT INSTRUCTIONS
Strep A test was negative in the office today, we are sending it for culture, it takes approximately 48-72 hours before we have results as sufficient time is needed for bacteria to grow. You can monitor for results on your Mychart. If it returns positive, I will call in medication for you and also call you.  COVID/Flu test sent out - results will be in your mychart. Due to you currently being pregnant, there are no documented safe antiviral interventions I would prescribe at this time. This test is more so we know whether you have COVID/Flu since it has been going around at your work.  For symptoms:  Warm moist air  Salt water gurgle  Chloraseptic throat spray  Honey  OTC pain medication such as Tylenol  You can take Robitussin for your cough as needed.      Follow up with Primary Care Provider in 3-5 days if not improving.  Proceed to Emergency Department if symptoms worsen.

## 2024-02-28 NOTE — LETTER
[address] [address]  Dept: [phone]    February 28, 2024    Patient: Rebecca Erickson  YOB: 1990    Rebecca Erickson was seen and evaluated at our [facility]. Please note if Covid and Flu tests are negative, they may return to work when fever free for 24 hours without the use of a fever reducing agent. If Covid or Flu test is positive, they may return to work on 3/1/2024, as this is 5 days from the onset of symptoms. Upon return, they must then adhere to strict masking for an additional 5 days.    Sincerely,    TAHIR Kothari

## 2024-03-01 ENCOUNTER — TELEPHONE (OUTPATIENT)
Dept: URGENT CARE | Facility: CLINIC | Age: 34
End: 2024-03-01

## 2024-03-01 DIAGNOSIS — J02.0 STREP PHARYNGITIS: Primary | ICD-10-CM

## 2024-03-01 LAB — BACTERIA THROAT CULT: ABNORMAL

## 2024-03-01 RX ORDER — CEPHALEXIN 500 MG/1
500 CAPSULE ORAL EVERY 12 HOURS SCHEDULED
Qty: 20 CAPSULE | Refills: 0 | Status: SHIPPED | OUTPATIENT
Start: 2024-03-01 | End: 2024-03-11

## 2024-09-04 ENCOUNTER — OFFICE VISIT (OUTPATIENT)
Dept: INTERNAL MEDICINE CLINIC | Facility: CLINIC | Age: 34
End: 2024-09-04
Payer: COMMERCIAL

## 2024-09-04 VITALS
HEIGHT: 64 IN | OXYGEN SATURATION: 100 % | HEART RATE: 87 BPM | BODY MASS INDEX: 39.35 KG/M2 | TEMPERATURE: 98 F | SYSTOLIC BLOOD PRESSURE: 120 MMHG | DIASTOLIC BLOOD PRESSURE: 59 MMHG | WEIGHT: 230.5 LBS

## 2024-09-04 DIAGNOSIS — Z13.6 SCREENING FOR CARDIOVASCULAR CONDITION: ICD-10-CM

## 2024-09-04 DIAGNOSIS — Z80.0 FAMILY HISTORY OF COLON CANCER IN FATHER: ICD-10-CM

## 2024-09-04 DIAGNOSIS — M79.605 LEFT LEG PAIN: ICD-10-CM

## 2024-09-04 DIAGNOSIS — Z13.1 SCREENING FOR DIABETES MELLITUS: ICD-10-CM

## 2024-09-04 DIAGNOSIS — G58.8 NEURALGIA OF LEFT PUDENDAL NERVE: Primary | ICD-10-CM

## 2024-09-04 PROBLEM — Z79.01 LONG TERM CURRENT USE OF ANTICOAGULANT: Status: ACTIVE | Noted: 2021-01-19

## 2024-09-04 PROBLEM — Z86.16 HISTORY OF COVID-19: Status: RESOLVED | Noted: 2021-10-13 | Resolved: 2024-09-04

## 2024-09-04 PROBLEM — D27.0 DERMOID CYST OF RIGHT OVARY: Status: ACTIVE | Noted: 2023-11-16

## 2024-09-04 PROBLEM — Z13.21 ENCOUNTER FOR VITAMIN DEFICIENCY SCREENING: Status: RESOLVED | Noted: 2023-01-04 | Resolved: 2024-09-04

## 2024-09-04 PROCEDURE — 99214 OFFICE O/P EST MOD 30 MIN: CPT | Performed by: NURSE PRACTITIONER

## 2024-09-04 RX ORDER — GABAPENTIN 100 MG/1
100 CAPSULE ORAL
Qty: 30 CAPSULE | Refills: 5 | Status: SHIPPED | OUTPATIENT
Start: 2024-09-04

## 2024-09-04 NOTE — PROGRESS NOTES
Ambulatory Visit  Name: Rebecca Erickson      : 1990      MRN: [id number]  Encounter Provider: TAHIR Flores  Encounter Date: 2024   Encounter department: [facility]    Assessment & Plan   1. Neuralgia of left pudendal nerve  -     gabapentin (NEURONTIN) 100 mg capsule; Take 1 capsule (100 mg total) by mouth daily at bedtime  -     EMG 1 Limb; Future  -     Comprehensive metabolic panel; Future  -     CBC and differential; Future  -     TSH, 3rd generation with Free T4 reflex; Future  2. Left leg pain  -     Comprehensive metabolic panel; Future  -     CBC and differential; Future  -     TSH, 3rd generation with Free T4 reflex; Future  3. BMI 39.0-39.9,adult  -     Comprehensive metabolic panel; Future  -     CBC and differential; Future  -     TSH, 3rd generation with Free T4 reflex; Future  4. Screening for cardiovascular condition  -     Lipid panel; Future  5. Screening for diabetes mellitus  -     Hemoglobin A1C  6. Family history of colon cancer in father     Will order an EMG of the left leg. Will order fasting labs. Will start on Gabapentin 100 mg at bedtime for nerve pain. Will call insurance about weight loss medications. Will bring back once she starts medication.    History of Present Illness     Rebecca is for a follow up visit. She did see GYN recently for vaginal and left leg pain. She was told she has a neuralgia of the pudednal nerve and are recommending PT. She can barely walk some days and yesterday tried taking the babies for a walk and could not go due to the pain. She also is trying to loose weight and is not sure options. She is willing to try weight loss medications but was looking into the gastric sleeve. She is dieting and tries walking but keeps gaining more weight. She did recently find out her dad has colon cancer stage 4 and was not sure the measures she should take.        Review of Systems   Constitutional:  Positive for unexpected weight  "change.   Musculoskeletal:  Positive for arthralgias and myalgias.   All other systems reviewed and are negative.      Objective     /59   Pulse 87   Temp 98 °F (36.7 °C)   Ht 5' 4\" (1.626 m)   Wt 105 kg (230 lb 8 oz)   SpO2 100%   BMI 39.57 kg/m²     Physical Exam  Vitals reviewed.   Constitutional:       Appearance: Normal appearance. She is obese.   Cardiovascular:      Rate and Rhythm: Normal rate and regular rhythm.      Pulses: Normal pulses.      Heart sounds: Normal heart sounds.   Pulmonary:      Effort: Pulmonary effort is normal.      Breath sounds: Normal breath sounds.   Skin:     General: Skin is warm and dry.      Capillary Refill: Capillary refill takes less than 2 seconds.   Neurological:      General: No focal deficit present.      Mental Status: She is alert and oriented to person, place, and time. Mental status is at baseline.   Psychiatric:         Mood and Affect: Mood normal.         Behavior: Behavior normal.         Thought Content: Thought content normal.         Judgment: Judgment normal.       Administrative Statements           "

## 2024-09-05 ENCOUNTER — APPOINTMENT (OUTPATIENT)
Age: 34
End: 2024-09-05
Payer: COMMERCIAL

## 2024-09-05 DIAGNOSIS — M79.605 LEFT LEG PAIN: ICD-10-CM

## 2024-09-05 DIAGNOSIS — Z13.6 SCREENING FOR CARDIOVASCULAR CONDITION: ICD-10-CM

## 2024-09-05 DIAGNOSIS — G58.8 NEURALGIA OF LEFT PUDENDAL NERVE: ICD-10-CM

## 2024-09-05 LAB
ALBUMIN SERPL BCG-MCNC: 4.3 G/DL (ref 3.5–5)
ALP SERPL-CCNC: 67 U/L (ref 34–104)
ALT SERPL W P-5'-P-CCNC: 24 U/L (ref 7–52)
ANION GAP SERPL CALCULATED.3IONS-SCNC: 11 MMOL/L (ref 4–13)
AST SERPL W P-5'-P-CCNC: 18 U/L (ref 13–39)
BASOPHILS # BLD AUTO: 0.06 THOUSANDS/ÂΜL (ref 0–0.1)
BASOPHILS NFR BLD AUTO: 1 % (ref 0–1)
BILIRUB SERPL-MCNC: 0.28 MG/DL (ref 0.2–1)
BUN SERPL-MCNC: 14 MG/DL (ref 5–25)
CALCIUM SERPL-MCNC: 9.3 MG/DL (ref 8.4–10.2)
CHLORIDE SERPL-SCNC: 105 MMOL/L (ref 96–108)
CHOLEST SERPL-MCNC: 190 MG/DL
CO2 SERPL-SCNC: 22 MMOL/L (ref 21–32)
CREAT SERPL-MCNC: 0.73 MG/DL (ref 0.6–1.3)
EOSINOPHIL # BLD AUTO: 0.17 THOUSAND/ÂΜL (ref 0–0.61)
EOSINOPHIL NFR BLD AUTO: 2 % (ref 0–6)
ERYTHROCYTE [DISTWIDTH] IN BLOOD BY AUTOMATED COUNT: 13.3 % (ref 11.6–15.1)
EST. AVERAGE GLUCOSE BLD GHB EST-MCNC: 120 MG/DL
GFR SERPL CREATININE-BSD FRML MDRD: 110 ML/MIN/1.73SQ M
GLUCOSE P FAST SERPL-MCNC: 90 MG/DL (ref 65–99)
HBA1C MFR BLD: 5.8 %
HCT VFR BLD AUTO: 39 % (ref 34.8–46.1)
HDLC SERPL-MCNC: 41 MG/DL
HGB BLD-MCNC: 12.6 G/DL (ref 11.5–15.4)
IMM GRANULOCYTES # BLD AUTO: 0.03 THOUSAND/UL (ref 0–0.2)
IMM GRANULOCYTES NFR BLD AUTO: 0 % (ref 0–2)
LDLC SERPL CALC-MCNC: 98 MG/DL (ref 0–100)
LYMPHOCYTES # BLD AUTO: 2.62 THOUSANDS/ÂΜL (ref 0.6–4.47)
LYMPHOCYTES NFR BLD AUTO: 37 % (ref 14–44)
MCH RBC QN AUTO: 29 PG (ref 26.8–34.3)
MCHC RBC AUTO-ENTMCNC: 32.3 G/DL (ref 31.4–37.4)
MCV RBC AUTO: 90 FL (ref 82–98)
MONOCYTES # BLD AUTO: 0.47 THOUSAND/ÂΜL (ref 0.17–1.22)
MONOCYTES NFR BLD AUTO: 7 % (ref 4–12)
NEUTROPHILS # BLD AUTO: 3.76 THOUSANDS/ÂΜL (ref 1.85–7.62)
NEUTS SEG NFR BLD AUTO: 53 % (ref 43–75)
NONHDLC SERPL-MCNC: 149 MG/DL
NRBC BLD AUTO-RTO: 0 /100 WBCS
PLATELET # BLD AUTO: 327 THOUSANDS/UL (ref 149–390)
PMV BLD AUTO: 10.5 FL (ref 8.9–12.7)
POTASSIUM SERPL-SCNC: 4 MMOL/L (ref 3.5–5.3)
PROT SERPL-MCNC: 7.6 G/DL (ref 6.4–8.4)
RBC # BLD AUTO: 4.35 MILLION/UL (ref 3.81–5.12)
SODIUM SERPL-SCNC: 138 MMOL/L (ref 135–147)
TRIGL SERPL-MCNC: 257 MG/DL
TSH SERPL DL<=0.05 MIU/L-ACNC: 1.66 UIU/ML (ref 0.45–4.5)
WBC # BLD AUTO: 7.11 THOUSAND/UL (ref 4.31–10.16)

## 2024-09-05 PROCEDURE — 80053 COMPREHEN METABOLIC PANEL: CPT

## 2024-09-05 PROCEDURE — 36415 COLL VENOUS BLD VENIPUNCTURE: CPT

## 2024-09-05 PROCEDURE — 83036 HEMOGLOBIN GLYCOSYLATED A1C: CPT | Performed by: NURSE PRACTITIONER

## 2024-09-05 PROCEDURE — 85025 COMPLETE CBC W/AUTO DIFF WBC: CPT

## 2024-09-05 PROCEDURE — 84443 ASSAY THYROID STIM HORMONE: CPT

## 2024-09-05 PROCEDURE — 80061 LIPID PANEL: CPT

## 2024-09-06 DIAGNOSIS — R73.03 PREDIABETES: Primary | ICD-10-CM

## 2024-09-06 RX ORDER — TIRZEPATIDE 2.5 MG/.5ML
2.5 INJECTION, SOLUTION SUBCUTANEOUS WEEKLY
Qty: 2 ML | Refills: 0 | Status: SHIPPED | OUTPATIENT
Start: 2024-09-06 | End: 2024-10-04

## 2024-09-16 ENCOUNTER — HOSPITAL ENCOUNTER (EMERGENCY)
Facility: HOSPITAL | Age: 34
Discharge: HOME | End: 2024-09-16
Attending: EMERGENCY MEDICINE | Admitting: EMERGENCY MEDICINE
Payer: COMMERCIAL

## 2024-09-16 ENCOUNTER — APPOINTMENT (EMERGENCY)
Dept: RADIOLOGY | Facility: HOSPITAL | Age: 34
End: 2024-09-16
Payer: COMMERCIAL

## 2024-09-16 VITALS
SYSTOLIC BLOOD PRESSURE: 144 MMHG | OXYGEN SATURATION: 99 % | RESPIRATION RATE: 18 BRPM | HEART RATE: 72 BPM | DIASTOLIC BLOOD PRESSURE: 73 MMHG | TEMPERATURE: 97 F

## 2024-09-16 DIAGNOSIS — R05.9 COUGH: Primary | ICD-10-CM

## 2024-09-16 DIAGNOSIS — U07.1 COVID-19: ICD-10-CM

## 2024-09-16 DIAGNOSIS — R07.89 NON-CARDIAC CHEST PAIN: ICD-10-CM

## 2024-09-16 PROCEDURE — 99285 EMERGENCY DEPT VISIT HI MDM: CPT | Performed by: EMERGENCY MEDICINE

## 2024-09-16 PROCEDURE — 71045 X-RAY EXAM CHEST 1 VIEW: CPT

## 2024-09-16 PROCEDURE — 96372 THER/PROPH/DIAG INJ SC/IM: CPT

## 2024-09-16 PROCEDURE — 99284 EMERGENCY DEPT VISIT MOD MDM: CPT

## 2024-09-16 PROCEDURE — 93005 ELECTROCARDIOGRAM TRACING: CPT

## 2024-09-16 RX ORDER — DEXAMETHASONE SODIUM PHOSPHATE 10 MG/ML
8 INJECTION, SOLUTION INTRAMUSCULAR; INTRAVENOUS ONCE
Status: COMPLETED | OUTPATIENT
Start: 2024-09-16 | End: 2024-09-16

## 2024-09-16 RX ADMIN — DEXAMETHASONE SODIUM PHOSPHATE 8 MG: 10 INJECTION, SOLUTION INTRAMUSCULAR; INTRAVENOUS at 14:58
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
## 2024-09-17 LAB
ATRIAL RATE: 73 BPM
P AXIS: 57 DEGREES
PR INTERVAL: 154 MS
QRS AXIS: 48 DEGREES
QRSD INTERVAL: 72 MS
QT INTERVAL: 386 MS
QTC INTERVAL: 425 MS
T WAVE AXIS: 50 DEGREES
VENTRICULAR RATE: 73 BPM

## 2024-09-17 PROCEDURE — 93010 ELECTROCARDIOGRAM REPORT: CPT | Performed by: INTERNAL MEDICINE

## 2024-09-20 DIAGNOSIS — U07.1 COVID-19: Primary | ICD-10-CM

## 2024-09-20 RX ORDER — DOXYCYCLINE 100 MG/1
100 CAPSULE ORAL 2 TIMES DAILY
Qty: 20 CAPSULE | Refills: 0 | Status: SHIPPED | OUTPATIENT
Start: 2024-09-20 | End: 2024-09-30

## 2024-09-20 RX ORDER — METHYLPREDNISOLONE 4 MG
TABLET, DOSE PACK ORAL
Qty: 21 EACH | Refills: 0 | Status: SHIPPED | OUTPATIENT
Start: 2024-09-20

## 2024-10-01 DIAGNOSIS — R73.03 PREDIABETES: ICD-10-CM

## 2024-10-01 RX ORDER — TIRZEPATIDE 2.5 MG/.5ML
2.5 INJECTION, SOLUTION SUBCUTANEOUS WEEKLY
Qty: 2 ML | Refills: 0 | Status: SHIPPED | OUTPATIENT
Start: 2024-10-01 | End: 2024-10-29

## 2024-10-09 ENCOUNTER — TELEPHONE (OUTPATIENT)
Age: 34
End: 2024-10-09

## 2024-10-09 NOTE — TELEPHONE ENCOUNTER
PA for Zepbound) 2.5 mg/0.5 mL auto-injector SUBMITTED     via    [x]CM-KEY: [id number]  []Surescripts-Case ID #   []Availity-Auth ID # NDC #   []Faxed to plan   []Other website   []Phone call Case ID #     Office notes sent, clinical questions answered. Awaiting determination    Turnaround time for your insurance to make a decision on your Prior Authorization can take 7-21 business days.

## 2024-10-10 NOTE — TELEPHONE ENCOUNTER
PA for Zepbound 2.5 mg  APPROVED     Date(s) approved 10/10/2024 to 04/10/2025      Patient advised by          []MyChart Message  []Phone call   []LMOM  []L/M to call office as no active Communication consent on file  []Unable to leave detailed message as VM not approved on Communication consent       Pharmacy advised by    [x]Fax  []Phone call    Approval letter scanned into Media Yes       Oriented - self; Oriented - place; Oriented - time

## 2024-10-26 ENCOUNTER — HOSPITAL ENCOUNTER (EMERGENCY)
Facility: HOSPITAL | Age: 34
Discharge: HOME | End: 2024-10-26
Attending: INTERNAL MEDICINE
Payer: COMMERCIAL

## 2024-10-26 VITALS
RESPIRATION RATE: 20 BRPM | DIASTOLIC BLOOD PRESSURE: 74 MMHG | SYSTOLIC BLOOD PRESSURE: 132 MMHG | OXYGEN SATURATION: 99 % | TEMPERATURE: 98.1 F | HEART RATE: 75 BPM

## 2024-10-26 DIAGNOSIS — R10.13 EPIGASTRIC PAIN: Primary | ICD-10-CM

## 2024-10-26 LAB
ALBUMIN SERPL BCG-MCNC: 4.4 G/DL (ref 3.5–5)
ALP SERPL-CCNC: 63 U/L (ref 34–104)
ALT SERPL W P-5'-P-CCNC: 20 U/L (ref 7–52)
AMORPH URATE CRY URNS QL MICRO: ABNORMAL /HPF
ANION GAP SERPL CALCULATED.3IONS-SCNC: 7 MMOL/L (ref 4–13)
AST SERPL W P-5'-P-CCNC: 13 U/L (ref 13–39)
B-HCG SERPL-ACNC: <0.6 MIU/ML (ref 0–5)
BACTERIA UR QL AUTO: ABNORMAL /HPF
BASOPHILS # BLD AUTO: 0.02 THOUSANDS/ΜL (ref 0–0.1)
BASOPHILS NFR BLD AUTO: 0 % (ref 0–1)
BILIRUB SERPL-MCNC: 0.39 MG/DL (ref 0.2–1)
BILIRUB UR QL STRIP: ABNORMAL
BUN SERPL-MCNC: 15 MG/DL (ref 5–25)
CALCIUM SERPL-MCNC: 9.2 MG/DL (ref 8.4–10.2)
CHLORIDE SERPL-SCNC: 106 MMOL/L (ref 96–108)
CLARITY UR: ABNORMAL
CO2 SERPL-SCNC: 25 MMOL/L (ref 21–32)
COLOR UR: YELLOW
CREAT SERPL-MCNC: 0.91 MG/DL (ref 0.6–1.3)
EOSINOPHIL # BLD AUTO: 0.19 THOUSAND/ΜL (ref 0–0.61)
EOSINOPHIL NFR BLD AUTO: 2 % (ref 0–6)
ERYTHROCYTE [DISTWIDTH] IN BLOOD BY AUTOMATED COUNT: 13.1 % (ref 11.6–15.1)
GFR SERPL CREATININE-BSD FRML MDRD: 84 ML/MIN/1.73SQ M
GLUCOSE SERPL-MCNC: 93 MG/DL (ref 65–140)
GLUCOSE UR STRIP-MCNC: NEGATIVE MG/DL
HCT VFR BLD AUTO: 42.1 % (ref 34.8–46.1)
HGB BLD-MCNC: 13.7 G/DL (ref 11.5–15.4)
HGB UR QL STRIP.AUTO: ABNORMAL
HYALINE CASTS #/AREA URNS LPF: ABNORMAL /LPF
IMM GRANULOCYTES # BLD AUTO: 0.03 THOUSAND/UL (ref 0–0.2)
IMM GRANULOCYTES NFR BLD AUTO: 0 % (ref 0–2)
KETONES UR STRIP-MCNC: ABNORMAL MG/DL
LEUKOCYTE ESTERASE UR QL STRIP: NEGATIVE
LIPASE SERPL-CCNC: 11 U/L (ref 11–82)
LYMPHOCYTES # BLD AUTO: 1.86 THOUSANDS/ΜL (ref 0.6–4.47)
LYMPHOCYTES NFR BLD AUTO: 15 % (ref 14–44)
MCH RBC QN AUTO: 28.8 PG (ref 26.8–34.3)
MCHC RBC AUTO-ENTMCNC: 32.5 G/DL (ref 31.4–37.4)
MCV RBC AUTO: 89 FL (ref 82–98)
MONOCYTES # BLD AUTO: 0.8 THOUSAND/ΜL (ref 0.17–1.22)
MONOCYTES NFR BLD AUTO: 7 % (ref 4–12)
MUCOUS THREADS UR QL AUTO: ABNORMAL
NEUTROPHILS # BLD AUTO: 9.14 THOUSANDS/ΜL (ref 1.85–7.62)
NEUTS SEG NFR BLD AUTO: 76 % (ref 43–75)
NITRITE UR QL STRIP: NEGATIVE
NON-SQ EPI CELLS URNS QL MICRO: ABNORMAL /HPF
NRBC BLD AUTO-RTO: 0 /100 WBCS
PH UR STRIP.AUTO: 6 [PH]
PLATELET # BLD AUTO: 331 THOUSANDS/UL (ref 149–390)
PMV BLD AUTO: 9.6 FL (ref 8.9–12.7)
POTASSIUM SERPL-SCNC: 3.5 MMOL/L (ref 3.5–5.3)
PROT SERPL-MCNC: 7.5 G/DL (ref 6.4–8.4)
PROT UR STRIP-MCNC: NEGATIVE MG/DL
RBC # BLD AUTO: 4.75 MILLION/UL (ref 3.81–5.12)
RBC #/AREA URNS AUTO: ABNORMAL /HPF
SODIUM SERPL-SCNC: 138 MMOL/L (ref 135–147)
SP GR UR STRIP.AUTO: >=1.03
UROBILINOGEN UR QL STRIP.AUTO: 0.2 E.U./DL
WBC # BLD AUTO: 12.04 THOUSAND/UL (ref 4.31–10.16)
WBC #/AREA URNS AUTO: ABNORMAL /HPF

## 2024-10-26 PROCEDURE — 96374 THER/PROPH/DIAG INJ IV PUSH: CPT

## 2024-10-26 PROCEDURE — 81003 URINALYSIS AUTO W/O SCOPE: CPT | Performed by: INTERNAL MEDICINE

## 2024-10-26 PROCEDURE — 84702 CHORIONIC GONADOTROPIN TEST: CPT | Performed by: INTERNAL MEDICINE

## 2024-10-26 PROCEDURE — 96361 HYDRATE IV INFUSION ADD-ON: CPT

## 2024-10-26 PROCEDURE — 96375 TX/PRO/DX INJ NEW DRUG ADDON: CPT

## 2024-10-26 PROCEDURE — 80053 COMPREHEN METABOLIC PANEL: CPT | Performed by: INTERNAL MEDICINE

## 2024-10-26 PROCEDURE — 85025 COMPLETE CBC W/AUTO DIFF WBC: CPT | Performed by: INTERNAL MEDICINE

## 2024-10-26 PROCEDURE — 99284 EMERGENCY DEPT VISIT MOD MDM: CPT | Performed by: INTERNAL MEDICINE

## 2024-10-26 PROCEDURE — 36415 COLL VENOUS BLD VENIPUNCTURE: CPT | Performed by: INTERNAL MEDICINE

## 2024-10-26 PROCEDURE — 81001 URINALYSIS AUTO W/SCOPE: CPT | Performed by: INTERNAL MEDICINE

## 2024-10-26 PROCEDURE — 99284 EMERGENCY DEPT VISIT MOD MDM: CPT

## 2024-10-26 PROCEDURE — 83690 ASSAY OF LIPASE: CPT | Performed by: INTERNAL MEDICINE

## 2024-10-26 RX ORDER — PANTOPRAZOLE SODIUM 40 MG/1
40 TABLET, DELAYED RELEASE ORAL DAILY
Qty: 21 TABLET | Refills: 0 | Status: SHIPPED | OUTPATIENT
Start: 2024-10-26

## 2024-10-26 RX ORDER — PANTOPRAZOLE SODIUM 40 MG/10ML
40 INJECTION, POWDER, LYOPHILIZED, FOR SOLUTION INTRAVENOUS ONCE
Status: COMPLETED | OUTPATIENT
Start: 2024-10-26 | End: 2024-10-26

## 2024-10-26 RX ORDER — ONDANSETRON 4 MG/1
4 TABLET, FILM COATED ORAL EVERY 6 HOURS
Qty: 12 TABLET | Refills: 0 | Status: SHIPPED | OUTPATIENT
Start: 2024-10-26

## 2024-10-26 RX ORDER — ONDANSETRON 2 MG/ML
4 INJECTION INTRAMUSCULAR; INTRAVENOUS ONCE
Status: COMPLETED | OUTPATIENT
Start: 2024-10-26 | End: 2024-10-26

## 2024-10-26 RX ORDER — SODIUM CHLORIDE 9 MG/ML
1000 INJECTION, SOLUTION INTRAVENOUS CONTINUOUS
Status: DISCONTINUED | OUTPATIENT
Start: 2024-10-26 | End: 2024-10-26 | Stop reason: HOSPADM

## 2024-10-26 RX ADMIN — ONDANSETRON 4 MG: 2 INJECTION INTRAMUSCULAR; INTRAVENOUS at 14:04

## 2024-10-26 RX ADMIN — SODIUM CHLORIDE 1000 ML/HR: 0.9 INJECTION, SOLUTION INTRAVENOUS at 14:04

## 2024-10-26 RX ADMIN — PANTOPRAZOLE SODIUM 40 MG: 40 INJECTION, POWDER, FOR SOLUTION INTRAVENOUS at 14:04

## 2024-10-26 NOTE — ED PROVIDER NOTES
ED Disposition       None          Assessment & Plan       Medical Decision Making  33-year-old female with abdominal pain this appears to be a side effect of the Zepbound.  Patient will discuss this with her PCP.  She does have some epigastric tenderness and some nausea still otherwise she feels significantly improved since coming to the emergency room and receiving fluids Zofran and Protonix.  I will discharge her home on Zofran and Protonix and follow-up with her PCP.    Amount and/or Complexity of Data Reviewed  Labs: ordered.    Risk  Prescription drug management.             Medications   sodium chloride 0.9 % infusion (1,000 mL/hr Intravenous New Bag 10/26/24 1404)   pantoprazole (PROTONIX) injection 40 mg (40 mg Intravenous Given 10/26/24 1404)   ondansetron (ZOFRAN) injection 4 mg (4 mg Intravenous Given 10/26/24 1404)       ED Risk Strat Scores                                               History of Present Illness       Chief Complaint   Patient presents with    Abdominal Pain     Began zepbound a few weeks ago. Has had severe abdominal pain and diarrhea since last PM        Past Medical History:   Diagnosis Date    Irritable bowel syndrome     last assessed: 10/23/2015    Maternal obesity, antepartum     last assessed: 2017    Migraine       Past Surgical History:   Procedure Laterality Date     SECTION      FOOT SURGERY        Family History   Problem Relation Age of Onset    Irritable bowel syndrome Mother     Ovarian cancer Family     Ovarian cancer Family       Social History     Tobacco Use    Smoking status: Never    Smokeless tobacco: Never   Vaping Use    Vaping status: Never Used   Substance Use Topics    Alcohol use: No    Drug use: No      E-Cigarette/Vaping    E-Cigarette Use Never User       E-Cigarette/Vaping Substances    Nicotine No     THC No     CBD No     Flavoring No     Other No     Unknown No       I have reviewed and agree with the history as documented.      33-year-old female presents with abdominal pain which started last evening.  Patient states she had several or frequent episodes of diarrhea just watery loose stools, no bright red blood per rectum or black tarry stools.  Patient also had nausea and vomiting she states she vomited approximately 4 times from last evening into this morning.  Patient has a history of irritable bowel, had similar symptoms 3 weeks ago when she was started on Zepbound.  However at that time she attributed to a stomach virus because all her children had similar symptoms.  Patient took her third injection of Zepbound on Thursday and became ill on Friday with nausea vomiting and diarrhea.  Her pain is mostly in the epigastric region, she has no lower abdominal pain suprapubic pain or CVA tenderness.        Review of Systems   Constitutional: Negative.    Respiratory: Negative.     Cardiovascular: Negative.    Gastrointestinal: Negative.    Musculoskeletal: Negative.    Neurological: Negative.    Hematological: Negative.    Psychiatric/Behavioral: Negative.             Objective       ED Triage Vitals [10/26/24 1314]   Temperature Pulse Blood Pressure Respirations SpO2 Patient Position - Orthostatic VS   98.1 °F (36.7 °C) 105 132/74 20 97 % --      Temp Source Heart Rate Source BP Location FiO2 (%) Pain Score    Temporal Monitor Left arm -- 8      Vitals      Date and Time Temp Pulse SpO2 Resp BP Pain Score FACES Pain Rating User   10/26/24 1314 98.1 °F (36.7 °C) 105 97 % 20 132/74 8 -- JCS            Physical Exam  Vitals and nursing note reviewed.   Constitutional:       Appearance: She is well-developed.   HENT:      Head: Normocephalic and atraumatic.   Cardiovascular:      Rate and Rhythm: Normal rate and regular rhythm.   Pulmonary:      Effort: Pulmonary effort is normal.      Breath sounds: Normal breath sounds.   Abdominal:      General: Abdomen is flat. Bowel sounds are normal. There is no distension or abdominal bruit. There  are no signs of injury.      Palpations: Abdomen is soft. There is no shifting dullness, fluid wave, hepatomegaly, splenomegaly, mass or pulsatile mass.      Tenderness: There is abdominal tenderness in the epigastric area. There is no right CVA tenderness, left CVA tenderness, guarding or rebound. Negative signs include Brice's sign, Rovsing's sign, McBurney's sign, psoas sign and obturator sign.      Hernia: No hernia is present.   Skin:     General: Skin is warm and dry.      Capillary Refill: Capillary refill takes less than 2 seconds.   Neurological:      General: No focal deficit present.      Mental Status: She is alert and oriented to person, place, and time.   Psychiatric:         Mood and Affect: Mood normal.         Behavior: Behavior normal.         Results Reviewed       Procedure Component Value Units Date/Time    Pregnancy, hCG, quantitative [[id number]]  (Normal) Collected: 10/26/24 1403    Lab Status: Final result Specimen: Blood from Arm, Left Updated: 10/26/24 1435     HCG, Quant <0.6 mIU/mL     Narrative:       Expected Ranges:    HCG results between 5.0 and 25.0 mIU/mL may be indicative of early pregnancy but should be interpreted in light of the total clinical presentation.    HCG can rise to detectable levels in jacy and post menopausal women (0-11.6 mIU/mL).     Approximate               Approximate HCG  Gestation age          Concentration ( mIU/mL)  _____________          ______________________   Weeks                      HCG values  0.2-1                       5-50  1-2                           2-3                         100-5000  3-4                         500-48544  4-5                         1000-08296  5-6                         [id number]  6-8                         [id number]  8-12                        [id number]      Comprehensive metabolic panel [[id number]] Collected: 10/26/24 1403    Lab Status: Final result Specimen: Blood from Arm, Left Updated: 10/26/24  1427     Sodium 138 mmol/L      Potassium 3.5 mmol/L      Chloride 106 mmol/L      CO2 25 mmol/L      ANION GAP 7 mmol/L      BUN 15 mg/dL      Creatinine 0.91 mg/dL      Glucose 93 mg/dL      Calcium 9.2 mg/dL      AST 13 U/L      ALT 20 U/L      Alkaline Phosphatase 63 U/L      Total Protein 7.5 g/dL      Albumin 4.4 g/dL      Total Bilirubin 0.39 mg/dL      eGFR 84 ml/min/1.73sq m     Narrative:      National Kidney Disease Foundation guidelines for Chronic Kidney Disease (CKD):     Stage 1 with normal or high GFR (GFR > 90 mL/min/1.73 square meters)    Stage 2 Mild CKD (GFR = 60-89 mL/min/1.73 square meters)    Stage 3A Moderate CKD (GFR = 45-59 mL/min/1.73 square meters)    Stage 3B Moderate CKD (GFR = 30-44 mL/min/1.73 square meters)    Stage 4 Severe CKD (GFR = 15-29 mL/min/1.73 square meters)    Stage 5 End Stage CKD (GFR <15 mL/min/1.73 square meters)  Note: GFR calculation is accurate only with a steady state creatinine    Lipase [[id number]]  (Normal) Collected: 10/26/24 1403    Lab Status: Final result Specimen: Blood from Arm, Left Updated: 10/26/24 1427     Lipase 11 u/L     CBC and differential [[id number]]  (Abnormal) Collected: 10/26/24 1403    Lab Status: Final result Specimen: Blood from Arm, Left Updated: 10/26/24 1413     WBC 12.04 Thousand/uL      RBC 4.75 Million/uL      Hemoglobin 13.7 g/dL      Hematocrit 42.1 %      MCV 89 fL      MCH 28.8 pg      MCHC 32.5 g/dL      RDW 13.1 %      MPV 9.6 fL      Platelets 331 Thousands/uL      nRBC 0 /100 WBCs      Segmented % 76 %      Immature Grans % 0 %      Lymphocytes % 15 %      Monocytes % 7 %      Eosinophils Relative 2 %      Basophils Relative 0 %      Absolute Neutrophils 9.14 Thousands/µL      Absolute Immature Grans 0.03 Thousand/uL      Absolute Lymphocytes 1.86 Thousands/µL      Absolute Monocytes 0.80 Thousand/µL      Eosinophils Absolute 0.19 Thousand/µL      Basophils Absolute 0.02 Thousands/µL     UA w Reflex to Microscopic w Reflex to  Culture [[id number]]     Lab Status: No result Specimen: Urine             No orders to display       Procedures    ED Medication and Procedure Management   Prior to Admission Medications   Prescriptions Last Dose Informant Patient Reported? Taking?   albuterol (ProAir HFA) 90 mcg/act inhaler   No No   Sig: Inhale 2 puffs every 6 (six) hours as needed for wheezing or shortness of breath   Patient not taking: Reported on 7/27/2023   benzonatate (TESSALON PERLES) 100 mg capsule   No No   Sig: Take 1 capsule (100 mg total) by mouth 3 (three) times a day as needed for cough   Patient not taking: Reported on 9/4/2024   clonazePAM (KlonoPIN) 0.5 mg tablet   No No   Sig: Take 1 tablet (0.5 mg total) by mouth 2 (two) times a day As needed   Patient not taking: Reported on 7/27/2023   enoxaparin (Lovenox) 40 mg/0.4 mL   No No   Sig: Inject 0.4 mL (40 mg total) under the skin in the morning for 14 days   gabapentin (NEURONTIN) 100 mg capsule   No No   Sig: Take 1 capsule (100 mg total) by mouth daily at bedtime   methylPREDNISolone 4 MG tablet therapy pack   No No   Sig: Use as directed on package   tirzepatide (Zepbound) 2.5 mg/0.5 mL auto-injector   No No   Sig: Inject 0.5 mL (2.5 mg total) under the skin once a week for 28 days      Facility-Administered Medications: None     Patient's Medications   Discharge Prescriptions    No medications on file     No discharge procedures on file.  ED SEPSIS DOCUMENTATION            Pavan Pitts MD  10/26/24 1092

## 2024-10-29 ENCOUNTER — OFFICE VISIT (OUTPATIENT)
Dept: INTERNAL MEDICINE CLINIC | Facility: CLINIC | Age: 34
End: 2024-10-29
Payer: COMMERCIAL

## 2024-10-29 VITALS
SYSTOLIC BLOOD PRESSURE: 123 MMHG | HEIGHT: 64 IN | WEIGHT: 219.2 LBS | OXYGEN SATURATION: 99 % | DIASTOLIC BLOOD PRESSURE: 68 MMHG | HEART RATE: 79 BPM | TEMPERATURE: 98 F | BODY MASS INDEX: 37.42 KG/M2

## 2024-10-29 DIAGNOSIS — R10.13 EPIGASTRIC PAIN: ICD-10-CM

## 2024-10-29 DIAGNOSIS — R11.2 NAUSEA AND VOMITING, UNSPECIFIED VOMITING TYPE: Primary | ICD-10-CM

## 2024-10-29 DIAGNOSIS — K58.0 IRRITABLE BOWEL SYNDROME WITH DIARRHEA: ICD-10-CM

## 2024-10-29 PROBLEM — R07.89 CHEST TIGHTNESS: Status: RESOLVED | Noted: 2023-03-16 | Resolved: 2024-10-29

## 2024-10-29 PROBLEM — M79.605 LEFT LEG PAIN: Status: RESOLVED | Noted: 2024-09-04 | Resolved: 2024-10-29

## 2024-10-29 PROBLEM — R52 BODY ACHES: Status: RESOLVED | Noted: 2023-01-04 | Resolved: 2024-10-29

## 2024-10-29 LAB
BACTERIA UR QL AUTO: ABNORMAL /HPF
BILIRUB UR QL STRIP: NEGATIVE
CAOX CRY URNS QL MICRO: ABNORMAL /HPF
CLARITY UR: ABNORMAL
COLOR UR: ABNORMAL
GLUCOSE UR STRIP-MCNC: NEGATIVE MG/DL
HGB UR QL STRIP.AUTO: ABNORMAL
KETONES UR STRIP-MCNC: NEGATIVE MG/DL
LEUKOCYTE ESTERASE UR QL STRIP: NEGATIVE
MUCOUS THREADS UR QL AUTO: ABNORMAL
NITRITE UR QL STRIP: NEGATIVE
NON-SQ EPI CELLS URNS QL MICRO: ABNORMAL /HPF
PH UR STRIP.AUTO: 5.5 [PH]
PROT UR STRIP-MCNC: ABNORMAL MG/DL
RBC #/AREA URNS AUTO: ABNORMAL /HPF
SP GR UR STRIP.AUTO: >=1.03 (ref 1–1.03)
UROBILINOGEN UR STRIP-ACNC: <2 MG/DL
WBC #/AREA URNS AUTO: ABNORMAL /HPF

## 2024-10-29 PROCEDURE — 81001 URINALYSIS AUTO W/SCOPE: CPT | Performed by: NURSE PRACTITIONER

## 2024-10-29 PROCEDURE — 87086 URINE CULTURE/COLONY COUNT: CPT | Performed by: NURSE PRACTITIONER

## 2024-10-29 PROCEDURE — 99214 OFFICE O/P EST MOD 30 MIN: CPT | Performed by: NURSE PRACTITIONER

## 2024-10-29 PROCEDURE — 87077 CULTURE AEROBIC IDENTIFY: CPT | Performed by: NURSE PRACTITIONER

## 2024-10-29 NOTE — PROGRESS NOTES
"Ambulatory Visit  Name: Rebecca Erickson      : 1990      MRN: [id number]  Encounter Provider: TAHIR Flores  Encounter Date: 10/29/2024   Encounter department: [facility]    Assessment & Plan  Nausea and vomiting, unspecified vomiting type    Orders:    CT abdomen pelvis wo contrast; Future    Urine culture; Future    UA (URINE) with reflex to Scope; Future    Epigastric pain    Orders:    CT abdomen pelvis wo contrast; Future    Urine culture; Future    UA (URINE) with reflex to Scope; Future    Irritable bowel syndrome with diarrhea    Orders:    CT abdomen pelvis wo contrast; Future    Urine culture; Future    UA (URINE) with reflex to Scope; Future     Will order a CT of the abdomen will obtain urine. Did advise if continuing with symptoms to stop medication. She will see how she does with the next injection. Will follow up in one year or sooner if need be.    History of Present Illness     Rebecca is for an ER follow up. She is doing better but still having nausea and epigastric pain. She states she has been taking the Zepbound for around 3 weeks. She is having diarrhea and is not sure if this related to the medication or the GI bug. She was in the Er and did have labs done which showed dehydration and a urine which was abnormal. She denies any fever. She would like to continue the shot to see how she does. She did loose weight on this.          Review of Systems   Gastrointestinal:  Positive for diarrhea and nausea.   All other systems reviewed and are negative.          Objective     /68   Pulse 79   Temp 98 °F (36.7 °C)   Ht 5' 4\" (1.626 m)   Wt 99.4 kg (219 lb 3.2 oz)   SpO2 99%   BMI 37.63 kg/m²     Physical Exam  Vitals reviewed.   Constitutional:       Appearance: Normal appearance. She is obese.   Cardiovascular:      Rate and Rhythm: Normal rate and regular rhythm.      Pulses: Normal pulses.      Heart sounds: Normal heart sounds.   Pulmonary:     "  Effort: Pulmonary effort is normal.      Breath sounds: Normal breath sounds.   Abdominal:      General: Abdomen is flat. Bowel sounds are normal.      Palpations: Abdomen is soft.   Skin:     General: Skin is warm and dry.      Capillary Refill: Capillary refill takes less than 2 seconds.   Neurological:      General: No focal deficit present.      Mental Status: She is alert and oriented to person, place, and time. Mental status is at baseline.   Psychiatric:         Mood and Affect: Mood normal.         Behavior: Behavior normal.         Thought Content: Thought content normal.         Judgment: Judgment normal.

## 2024-10-29 NOTE — ASSESSMENT & PLAN NOTE
Orders:    CT abdomen pelvis wo contrast; Future    Urine culture; Future    UA (URINE) with reflex to Scope; Future

## 2024-10-30 ENCOUNTER — HOSPITAL ENCOUNTER (EMERGENCY)
Facility: HOSPITAL | Age: 34
Discharge: HOME | End: 2024-10-30
Attending: EMERGENCY MEDICINE | Admitting: EMERGENCY MEDICINE
Payer: COMMERCIAL

## 2024-10-30 ENCOUNTER — APPOINTMENT (EMERGENCY)
Dept: CT IMAGING | Facility: HOSPITAL | Age: 34
End: 2024-10-30
Payer: COMMERCIAL

## 2024-10-30 VITALS
HEIGHT: 64 IN | BODY MASS INDEX: 37.39 KG/M2 | OXYGEN SATURATION: 98 % | SYSTOLIC BLOOD PRESSURE: 122 MMHG | DIASTOLIC BLOOD PRESSURE: 60 MMHG | HEART RATE: 78 BPM | RESPIRATION RATE: 17 BRPM | WEIGHT: 219 LBS | TEMPERATURE: 96.5 F

## 2024-10-30 DIAGNOSIS — N83.209 OVARIAN CYST: ICD-10-CM

## 2024-10-30 DIAGNOSIS — R10.9 ABDOMINAL PAIN: Primary | ICD-10-CM

## 2024-10-30 LAB
ALBUMIN SERPL BCG-MCNC: 4.6 G/DL (ref 3.5–5)
ALP SERPL-CCNC: 66 U/L (ref 34–104)
ALT SERPL W P-5'-P-CCNC: 26 U/L (ref 7–52)
ANION GAP SERPL CALCULATED.3IONS-SCNC: 7 MMOL/L (ref 4–13)
AST SERPL W P-5'-P-CCNC: 19 U/L (ref 13–39)
BASOPHILS # BLD AUTO: 0.02 THOUSANDS/ΜL (ref 0–0.1)
BASOPHILS NFR BLD AUTO: 0 % (ref 0–1)
BILIRUB SERPL-MCNC: 0.2 MG/DL (ref 0.2–1)
BUN SERPL-MCNC: 13 MG/DL (ref 5–25)
CALCIUM SERPL-MCNC: 9.3 MG/DL (ref 8.4–10.2)
CHLORIDE SERPL-SCNC: 107 MMOL/L (ref 96–108)
CO2 SERPL-SCNC: 23 MMOL/L (ref 21–32)
CREAT SERPL-MCNC: 0.84 MG/DL (ref 0.6–1.3)
EOSINOPHIL # BLD AUTO: 0.45 THOUSAND/ΜL (ref 0–0.61)
EOSINOPHIL NFR BLD AUTO: 4 % (ref 0–6)
ERYTHROCYTE [DISTWIDTH] IN BLOOD BY AUTOMATED COUNT: 13.2 % (ref 11.6–15.1)
GFR SERPL CREATININE-BSD FRML MDRD: 92 ML/MIN/1.73SQ M
GLUCOSE SERPL-MCNC: 104 MG/DL (ref 65–140)
HCG SERPL QL: NEGATIVE
HCT VFR BLD AUTO: 41 % (ref 34.8–46.1)
HGB BLD-MCNC: 13.2 G/DL (ref 11.5–15.4)
IMM GRANULOCYTES # BLD AUTO: 0.03 THOUSAND/UL (ref 0–0.2)
IMM GRANULOCYTES NFR BLD AUTO: 0 % (ref 0–2)
LIPASE SERPL-CCNC: 22 U/L (ref 11–82)
LYMPHOCYTES # BLD AUTO: 2.22 THOUSANDS/ΜL (ref 0.6–4.47)
LYMPHOCYTES NFR BLD AUTO: 19 % (ref 14–44)
MCH RBC QN AUTO: 28.9 PG (ref 26.8–34.3)
MCHC RBC AUTO-ENTMCNC: 32.2 G/DL (ref 31.4–37.4)
MCV RBC AUTO: 90 FL (ref 82–98)
MONOCYTES # BLD AUTO: 0.53 THOUSAND/ΜL (ref 0.17–1.22)
MONOCYTES NFR BLD AUTO: 4 % (ref 4–12)
NEUTROPHILS # BLD AUTO: 8.77 THOUSANDS/ΜL (ref 1.85–7.62)
NEUTS SEG NFR BLD AUTO: 73 % (ref 43–75)
NRBC BLD AUTO-RTO: 0 /100 WBCS
PLATELET # BLD AUTO: 327 THOUSANDS/UL (ref 149–390)
PMV BLD AUTO: 9.7 FL (ref 8.9–12.7)
POTASSIUM SERPL-SCNC: 3.6 MMOL/L (ref 3.5–5.3)
PROT SERPL-MCNC: 7.5 G/DL (ref 6.4–8.4)
RBC # BLD AUTO: 4.57 MILLION/UL (ref 3.81–5.12)
SODIUM SERPL-SCNC: 137 MMOL/L (ref 135–147)
WBC # BLD AUTO: 12.02 THOUSAND/UL (ref 4.31–10.16)

## 2024-10-30 PROCEDURE — 83690 ASSAY OF LIPASE: CPT | Performed by: EMERGENCY MEDICINE

## 2024-10-30 PROCEDURE — 96361 HYDRATE IV INFUSION ADD-ON: CPT

## 2024-10-30 PROCEDURE — 36415 COLL VENOUS BLD VENIPUNCTURE: CPT | Performed by: EMERGENCY MEDICINE

## 2024-10-30 PROCEDURE — 85025 COMPLETE CBC W/AUTO DIFF WBC: CPT | Performed by: EMERGENCY MEDICINE

## 2024-10-30 PROCEDURE — 96374 THER/PROPH/DIAG INJ IV PUSH: CPT

## 2024-10-30 PROCEDURE — 99284 EMERGENCY DEPT VISIT MOD MDM: CPT | Performed by: EMERGENCY MEDICINE

## 2024-10-30 PROCEDURE — 96375 TX/PRO/DX INJ NEW DRUG ADDON: CPT

## 2024-10-30 PROCEDURE — 74177 CT ABD & PELVIS W/CONTRAST: CPT

## 2024-10-30 PROCEDURE — 99284 EMERGENCY DEPT VISIT MOD MDM: CPT

## 2024-10-30 PROCEDURE — 84703 CHORIONIC GONADOTROPIN ASSAY: CPT | Performed by: EMERGENCY MEDICINE

## 2024-10-30 PROCEDURE — 80053 COMPREHEN METABOLIC PANEL: CPT | Performed by: EMERGENCY MEDICINE

## 2024-10-30 RX ORDER — SUCRALFATE 1 G/1
1 TABLET ORAL ONCE
Status: COMPLETED | OUTPATIENT
Start: 2024-10-30 | End: 2024-10-30

## 2024-10-30 RX ORDER — MAGNESIUM HYDROXIDE/ALUMINUM HYDROXICE/SIMETHICONE 120; 1200; 1200 MG/30ML; MG/30ML; MG/30ML
30 SUSPENSION ORAL ONCE
Status: COMPLETED | OUTPATIENT
Start: 2024-10-30 | End: 2024-10-30

## 2024-10-30 RX ORDER — SUCRALFATE 1 G/1
1 TABLET ORAL 2 TIMES DAILY PRN
Qty: 30 TABLET | Refills: 0 | Status: SHIPPED | OUTPATIENT
Start: 2024-10-30

## 2024-10-30 RX ORDER — SUCRALFATE 1 G/1
TABLET ORAL
Status: DISPENSED
Start: 2024-10-30 | End: 2024-10-30

## 2024-10-30 RX ORDER — ONDANSETRON 2 MG/ML
INJECTION INTRAMUSCULAR; INTRAVENOUS
Status: DISPENSED
Start: 2024-10-30 | End: 2024-10-30

## 2024-10-30 RX ORDER — ONDANSETRON 2 MG/ML
4 INJECTION INTRAMUSCULAR; INTRAVENOUS ONCE
Status: COMPLETED | OUTPATIENT
Start: 2024-10-30 | End: 2024-10-30

## 2024-10-30 RX ORDER — MAGNESIUM HYDROXIDE/ALUMINUM HYDROXICE/SIMETHICONE 120; 1200; 1200 MG/30ML; MG/30ML; MG/30ML
SUSPENSION ORAL
Status: DISPENSED
Start: 2024-10-30 | End: 2024-10-30

## 2024-10-30 RX ORDER — KETOROLAC TROMETHAMINE 30 MG/ML
15 INJECTION, SOLUTION INTRAMUSCULAR; INTRAVENOUS ONCE
Status: COMPLETED | OUTPATIENT
Start: 2024-10-30 | End: 2024-10-30

## 2024-10-30 RX ORDER — KETOROLAC TROMETHAMINE 30 MG/ML
INJECTION, SOLUTION INTRAMUSCULAR; INTRAVENOUS
Status: DISPENSED
Start: 2024-10-30 | End: 2024-10-30

## 2024-10-30 RX ADMIN — ALUMINUM HYDROXIDE, MAGNESIUM HYDROXIDE, AND DIMETHICONE 30 ML: 200; 20; 200 SUSPENSION ORAL at 05:23

## 2024-10-30 RX ADMIN — KETOROLAC TROMETHAMINE 15 MG: 30 INJECTION, SOLUTION INTRAMUSCULAR at 04:04

## 2024-10-30 RX ADMIN — SODIUM CHLORIDE 1000 ML: 0.9 INJECTION, SOLUTION INTRAVENOUS at 04:03

## 2024-10-30 RX ADMIN — SUCRALFATE 1 G: 1 TABLET ORAL at 05:23

## 2024-10-30 RX ADMIN — ONDANSETRON 4 MG: 2 INJECTION INTRAMUSCULAR; INTRAVENOUS at 04:04

## 2024-10-30 RX ADMIN — IOHEXOL 100 ML: 350 INJECTION, SOLUTION INTRAVENOUS at 04:47

## 2024-10-30 NOTE — ED PROVIDER NOTES
Time reflects when diagnosis was documented in both MDM as applicable and the Disposition within this note       Time User Action Codes Description Comment    10/30/2024  5:21 AM Fermin Conteh Add [R10.9] Abdominal pain     10/30/2024  5:21 AM Fermin Conteh Add [N83.209] Ovarian cyst           ED Disposition       ED Disposition   Discharge    Condition   Stable    Date/Time   Wed Oct 30, 2024  5:19 AM    Comment   Rebecca Erickson discharge to home/self care.                   Assessment & Plan       Medical Decision Making  Patient is a 33-year-old female who presents for evaluation of abdominal pain.  I believe her symptoms are likely secondary to side effects of Zappala.  On my read, CT scan shows that the stomach is somewhat distended.  Gallbladder is unremarkable.  Otherwise blood work again is unremarkable.  Advised that she should follow-up with her PCP for likely stopping this medication and given Carafate with instructions to continue PPI.  Given GI follow-up in case there is need for EGD.  Strict return precautions given.    Amount and/or Complexity of Data Reviewed  Labs: ordered. Decision-making details documented in ED Course.  Radiology: ordered.    Risk  OTC drugs.  Prescription drug management.        ED Course as of 10/30/24 0635   Wed Oct 30, 2024   0405 WBC(!): 12.02  Similar to 4 days ago       Medications   ketorolac (TORADOL) injection 15 mg (15 mg Intravenous Given 10/30/24 0404)   ondansetron (ZOFRAN) injection 4 mg (4 mg Intravenous Given 10/30/24 0404)   sodium chloride 0.9 % bolus 1,000 mL (0 mL Intravenous Stopped 10/30/24 0529)   iohexol (OMNIPAQUE) 350 MG/ML injection (MULTI-DOSE) 100 mL (100 mL Intravenous Given 10/30/24 0447)   aluminum-magnesium hydroxide-simethicone (MAALOX) oral suspension 30 mL (30 mL Oral Given 10/30/24 0523)   sucralfate (CARAFATE) tablet 1 g (1 g Oral Given 10/30/24 0523)       ED Risk Strat Scores                           SBIRT 22yo+      Flowsheet Row  Most Recent Value   Initial Alcohol Screen: US AUDIT-C     1. How often do you have a drink containing alcohol? 0 Filed at: 10/30/2024 0353   2. How many drinks containing alcohol do you have on a typical day you are drinking?  0 Filed at: 10/30/2024 0353   3b. FEMALE Any Age, or MALE 65+: How often do you have 4 or more drinks on one occassion? 0 Filed at: 10/30/2024 0353   Audit-C Score 0 Filed at: 10/30/2024 0353   VIKY: How many times in the past year have you...    Used an illegal drug or used a prescription medication for non-medical reasons? Never Filed at: 10/30/2024 0353                            History of Present Illness       Chief Complaint   Patient presents with    Abdominal Pain     Revisit from Saturday for abdominal pain through to back. Nauseated.       Past Medical History:   Diagnosis Date    Irritable bowel syndrome     last assessed: 10/23/2015    Maternal obesity, antepartum     last assessed: 2017    Migraine       Past Surgical History:   Procedure Laterality Date     SECTION      FOOT SURGERY        Family History   Problem Relation Age of Onset    Irritable bowel syndrome Mother     Ovarian cancer Family     Ovarian cancer Family       Social History     Tobacco Use    Smoking status: Never    Smokeless tobacco: Never   Vaping Use    Vaping status: Never Used   Substance Use Topics    Alcohol use: No    Drug use: No      E-Cigarette/Vaping    E-Cigarette Use Never User       E-Cigarette/Vaping Substances    Nicotine No     THC No     CBD No     Flavoring No     Other No     Unknown No       I have reviewed and agree with the history as documented.     Patient is a 33-year-old female that presents for evaluation of abdominal pain.  She was seen here 4 days ago for the same complaint with a negative workup.  Patient is on Zepbound and was thought that her symptoms are likely a side effect of the medication.  She says that she has been still having intermittent epigastric  abdominal pain radiating into her back.  She is also had some nausea and vomiting and diarrhea.  Diarrhea is somewhat chronic, vomiting is happening about once a day.  Patient denies any blood in the emesis or stool.  She has been on this about for about 3 weeks.  She denies any urinary complaints.  Pain is currently moderate, she has taken some Zofran but nothing for the pain.        Review of Systems   Constitutional:  Negative for fever.   HENT:  Negative for sore throat.    Respiratory:  Negative for shortness of breath.    Cardiovascular:  Negative for chest pain.   Gastrointestinal:  Positive for abdominal pain, diarrhea, nausea and vomiting.   Genitourinary:  Negative for dysuria.   Musculoskeletal:  Negative for back pain.   Skin:  Negative for rash.   Neurological:  Negative for light-headedness.   Psychiatric/Behavioral:  Negative for agitation.    All other systems reviewed and are negative.          Objective       ED Triage Vitals   Temperature Pulse Blood Pressure Respirations SpO2 Patient Position - Orthostatic VS   10/30/24 0354 10/30/24 0353 10/30/24 0353 10/30/24 0353 10/30/24 0353 10/30/24 0353   (!) 96.5 °F (35.8 °C) 94 136/85 16 97 % Sitting      Temp Source Heart Rate Source BP Location FiO2 (%) Pain Score    10/30/24 0354 10/30/24 0353 10/30/24 0353 -- 10/30/24 0351    Temporal Monitor Left arm  7      Vitals      Date and Time Temp Pulse SpO2 Resp BP Pain Score FACES Pain Rating User   10/30/24 0526 -- 78 98 % 17 122/60 6 -- AF   10/30/24 0403 -- -- -- -- -- 8 -- AF   10/30/24 0354 96.5 °F (35.8 °C) -- -- -- -- -- -- TG   10/30/24 0353 -- 94 97 % 16 136/85 -- -- TG   10/30/24 0351 -- -- -- -- -- 7 -- TG            Physical Exam  Vitals reviewed.   Constitutional:       General: She is not in acute distress.     Appearance: She is well-developed.   HENT:      Head: Normocephalic.   Eyes:      Pupils: Pupils are equal, round, and reactive to light.   Cardiovascular:      Rate and Rhythm: Normal  rate and regular rhythm.      Heart sounds: Normal heart sounds.   Pulmonary:      Effort: Pulmonary effort is normal.      Breath sounds: Normal breath sounds.   Abdominal:      General: Bowel sounds are normal. There is no distension.      Palpations: Abdomen is soft.      Tenderness: There is abdominal tenderness. There is no guarding.      Comments: Mild epigastric abdominal tenderness, no rebound tenderness or guarding.  Brice sign negative.   Musculoskeletal:         General: No tenderness or deformity. Normal range of motion.      Cervical back: Normal range of motion and neck supple.   Skin:     General: Skin is warm and dry.      Capillary Refill: Capillary refill takes less than 2 seconds.   Neurological:      Mental Status: She is alert and oriented to person, place, and time.      Cranial Nerves: No cranial nerve deficit.      Sensory: No sensory deficit.   Psychiatric:         Behavior: Behavior normal.         Thought Content: Thought content normal.         Judgment: Judgment normal.         Results Reviewed       Procedure Component Value Units Date/Time    hCG, qualitative pregnancy [[id number]]  (Normal) Collected: 10/30/24 0357    Lab Status: Final result Specimen: Blood from Arm, Left Updated: 10/30/24 0541     Preg, Serum Negative    Lipase [[id number]]  (Normal) Collected: 10/30/24 0357    Lab Status: Final result Specimen: Blood from Arm, Left Updated: 10/30/24 0426     Lipase 22 u/L     CMP [[id number]] Collected: 10/30/24 0357    Lab Status: Final result Specimen: Blood from Arm, Left Updated: 10/30/24 0426     Sodium 137 mmol/L      Potassium 3.6 mmol/L      Chloride 107 mmol/L      CO2 23 mmol/L      ANION GAP 7 mmol/L      BUN 13 mg/dL      Creatinine 0.84 mg/dL      Glucose 104 mg/dL      Calcium 9.3 mg/dL      AST 19 U/L      ALT 26 U/L      Alkaline Phosphatase 66 U/L      Total Protein 7.5 g/dL      Albumin 4.6 g/dL      Total Bilirubin 0.20 mg/dL      eGFR 92 ml/min/1.73sq m      Narrative:      National Kidney Disease Foundation guidelines for Chronic Kidney Disease (CKD):     Stage 1 with normal or high GFR (GFR > 90 mL/min/1.73 square meters)    Stage 2 Mild CKD (GFR = 60-89 mL/min/1.73 square meters)    Stage 3A Moderate CKD (GFR = 45-59 mL/min/1.73 square meters)    Stage 3B Moderate CKD (GFR = 30-44 mL/min/1.73 square meters)    Stage 4 Severe CKD (GFR = 15-29 mL/min/1.73 square meters)    Stage 5 End Stage CKD (GFR <15 mL/min/1.73 square meters)  Note: GFR calculation is accurate only with a steady state creatinine    CBC and differential [[id number]]  (Abnormal) Collected: 10/30/24 0357    Lab Status: Final result Specimen: Blood from Arm, Left Updated: 10/30/24 0404     WBC 12.02 Thousand/uL      RBC 4.57 Million/uL      Hemoglobin 13.2 g/dL      Hematocrit 41.0 %      MCV 90 fL      MCH 28.9 pg      MCHC 32.2 g/dL      RDW 13.2 %      MPV 9.7 fL      Platelets 327 Thousands/uL      nRBC 0 /100 WBCs      Segmented % 73 %      Immature Grans % 0 %      Lymphocytes % 19 %      Monocytes % 4 %      Eosinophils Relative 4 %      Basophils Relative 0 %      Absolute Neutrophils 8.77 Thousands/µL      Absolute Immature Grans 0.03 Thousand/uL      Absolute Lymphocytes 2.22 Thousands/µL      Absolute Monocytes 0.53 Thousand/µL      Eosinophils Absolute 0.45 Thousand/µL      Basophils Absolute 0.02 Thousands/µL     UA w Reflex to Microscopic w Reflex to Culture [[id number]]     Lab Status: No result Specimen: Urine             CT abdomen pelvis with contrast   Final Interpretation by Reji Montelongo MD (10/30 0514)         1. No acute abnormality noted in the abdomen or pelvis.   2. Incidentally identified 3.0 cm fat-containing right adnexal mass most consistent with teratoma/dermoid, not clearly identified on prior imaging 5/6/2009. Gynecologic follow-up suggested.   3. Simple appearing 2.9 cm left adnexal cyst.   4. Additional findings as noted.            Workstation performed:  [id number]             Procedures    ED Medication and Procedure Management   Prior to Admission Medications   Prescriptions Last Dose Informant Patient Reported? Taking?   etonogestrel (NEXPLANON) subdermal implant   Yes No   Si mg by Subdermal route Once every 3 years   gabapentin (NEURONTIN) 100 mg capsule   No No   Sig: Take 1 capsule (100 mg total) by mouth daily at bedtime   ondansetron (ZOFRAN) 4 mg tablet   No No   Sig: Take 1 tablet (4 mg total) by mouth every 6 (six) hours   pantoprazole (PROTONIX) 40 mg tablet   No No   Sig: Take 1 tablet (40 mg total) by mouth daily   tirzepatide (Zepbound) 2.5 mg/0.5 mL auto-injector   No No   Sig: Inject 0.5 mL (2.5 mg total) under the skin once a week for 28 days      Facility-Administered Medications: None     Discharge Medication List as of 10/30/2024  5:22 AM        START taking these medications    Details   sucralfate (CARAFATE) 1 g tablet Take 1 tablet (1 g total) by mouth 2 (two) times a day as needed (abd pain), Starting Wed 10/30/2024, Normal           CONTINUE these medications which have NOT CHANGED    Details   etonogestrel (NEXPLANON) subdermal implant 68 mg by Subdermal route Once every 3 years, Historical Med      gabapentin (NEURONTIN) 100 mg capsule Take 1 capsule (100 mg total) by mouth daily at bedtime, Starting Wed 2024, Normal      ondansetron (ZOFRAN) 4 mg tablet Take 1 tablet (4 mg total) by mouth every 6 (six) hours, Starting Sat 10/26/2024, Normal      pantoprazole (PROTONIX) 40 mg tablet Take 1 tablet (40 mg total) by mouth daily, Starting Sat 10/26/2024, Normal           STOP taking these medications       tirzepatide (Zepbound) 2.5 mg/0.5 mL auto-injector Comments:   Reason for Stopping:               ED SEPSIS DOCUMENTATION   Time reflects when diagnosis was documented in both MDM as applicable and the Disposition within this note       Time User Action Codes Description Comment    10/30/2024  5:21 AM Fermin Conteh Add [R10.9]  Abdominal pain     10/30/2024  5:21 AM Fermin Conteh Add [N83.209] Ovarian cyst                  Fermin Conteh MD  10/30/24 0637

## 2024-10-30 NOTE — DISCHARGE INSTRUCTIONS
-Please continue to take the Protonix as prescribed and Carafate as needed for abdominal pain.  Please follow-up with GI as discussed and return to care if develop any new or worsening symptoms.

## 2024-10-31 LAB — BACTERIA UR CULT: ABNORMAL

## 2024-11-05 ENCOUNTER — OFFICE VISIT (OUTPATIENT)
Dept: INTERNAL MEDICINE CLINIC | Facility: CLINIC | Age: 34
End: 2024-11-05
Payer: COMMERCIAL

## 2024-11-05 VITALS — HEIGHT: 64 IN | WEIGHT: 215 LBS | BODY MASS INDEX: 36.7 KG/M2

## 2024-11-05 DIAGNOSIS — B96.89 BACTERIAL VAGINOSIS: ICD-10-CM

## 2024-11-05 DIAGNOSIS — N76.0 BACTERIAL VAGINOSIS: ICD-10-CM

## 2024-11-05 PROBLEM — R11.2 NAUSEA AND VOMITING: Status: RESOLVED | Noted: 2023-01-04 | Resolved: 2024-11-05

## 2024-11-05 PROCEDURE — 99213 OFFICE O/P EST LOW 20 MIN: CPT | Performed by: NURSE PRACTITIONER

## 2024-11-05 RX ORDER — TIRZEPATIDE 5 MG/.5ML
5 INJECTION, SOLUTION SUBCUTANEOUS WEEKLY
Qty: 2 ML | Refills: 0 | Status: SHIPPED | OUTPATIENT
Start: 2024-11-05

## 2024-11-05 NOTE — PROGRESS NOTES
"Ambulatory Visit  Name: Rebecca Erickson      : 1990      MRN: [id number]  Encounter Provider: TAHIR Flores  Encounter Date: 2024   Encounter department: [facility]    Assessment & Plan  BMI 39.0-39.9,adult    Orders:    tirzepatide (Zepbound) 5 mg/0.5 mL auto-injector; Inject 0.5 mL (5 mg total) under the skin once a week    Bacterial vaginosis          Will increase Zepbound to 5 mg. Did advise to monitor bowel movements and if any nausea call the office. Will follow back up in 3 months or sooner if need be.    History of Present Illness     Rebecca is for a follow up visit. She is feeling much better and thinks she may have had food poisoning from sushi. She states she did take her last Zepbound dose of the 2.5 mg and was fine. She also did see GYN and was diagnosed with BV but waiting to hear from them back. Results did come back last night. She otherwise is doing well having no other issues.          Review of Systems   All other systems reviewed and are negative.          Objective     Ht 5' 4\" (1.626 m)   Wt 97.5 kg (215 lb)   BMI 36.90 kg/m²     Physical Exam  Constitutional:       Appearance: Normal appearance. She is obese.   Skin:     General: Skin is warm and dry.      Capillary Refill: Capillary refill takes less than 2 seconds.   Neurological:      General: No focal deficit present.      Mental Status: She is alert and oriented to person, place, and time. Mental status is at baseline.   Psychiatric:         Mood and Affect: Mood normal.         Behavior: Behavior normal.         Thought Content: Thought content normal.         Judgment: Judgment normal.         "

## 2024-12-02 RX ORDER — TIRZEPATIDE 7.5 MG/.5ML
7.5 INJECTION, SOLUTION SUBCUTANEOUS WEEKLY
Qty: 2 ML | Refills: 0 | Status: SHIPPED | OUTPATIENT
Start: 2024-12-02

## 2024-12-30 ENCOUNTER — TELEPHONE (OUTPATIENT)
Age: 34
End: 2024-12-30

## 2024-12-30 ENCOUNTER — TELEPHONE (OUTPATIENT)
Dept: INTERNAL MEDICINE CLINIC | Facility: CLINIC | Age: 34
End: 2024-12-30

## 2024-12-30 NOTE — TELEPHONE ENCOUNTER
Rebecca had the flu last week.  Stated she is still sick.  She is scheduled for an appt tomorrow, but is asking for an inhaler.  Stated she had one in the past.  She is worried about pneumonia or bronchitis.

## 2024-12-31 ENCOUNTER — OFFICE VISIT (OUTPATIENT)
Dept: INTERNAL MEDICINE CLINIC | Facility: CLINIC | Age: 34
End: 2024-12-31
Payer: COMMERCIAL

## 2024-12-31 DIAGNOSIS — J06.9 UPPER RESPIRATORY TRACT INFECTION, UNSPECIFIED TYPE: Primary | ICD-10-CM

## 2024-12-31 PROCEDURE — 99213 OFFICE O/P EST LOW 20 MIN: CPT | Performed by: NURSE PRACTITIONER

## 2024-12-31 RX ORDER — PREDNISONE 10 MG/1
TABLET ORAL
Qty: 18 TABLET | Refills: 0 | Status: SHIPPED | OUTPATIENT
Start: 2024-12-31

## 2024-12-31 RX ORDER — AZITHROMYCIN 250 MG/1
TABLET, FILM COATED ORAL
Qty: 6 TABLET | Refills: 0 | Status: SHIPPED | OUTPATIENT
Start: 2024-12-31 | End: 2025-01-04

## 2024-12-31 NOTE — PROGRESS NOTES
Name: Rebecca Erickson      : 1990      MRN: [id number]  Encounter Provider: TAHIR Flores  Encounter Date: 2024   Encounter department: [facility] [place]  :  Assessment & Plan  Upper respiratory tract infection, unspecified type    Orders:    azithromycin (ZITHROMAX) 250 mg tablet; Take 2 tablets today then 1 tablet daily x 4 days    predniSONE 10 mg tablet; 30 mg by mouth daily for 3 days, then 20 mg by mouth daily for 3 days, then 10 mg by mouth daily for 3 days, then stop    Will start on a Z pack take as directed do not have allergy to this. Will call in course of steroids as well. Will follow up as needed.       History of Present Illness     Rebecca is for an acute visit. She states her children did test positive for Flu last week. She states she is having cough and congestion and did throw up this am. She denies any fever. She offers no other issues.      Review of Systems   HENT:  Positive for congestion.    Respiratory:  Positive for cough.    All other systems reviewed and are negative.      Objective   There were no vitals taken for this visit.     Physical Exam  Vitals reviewed.   Constitutional:       Appearance: Normal appearance. She is normal weight.   HENT:      Right Ear: Tympanic membrane, ear canal and external ear normal.      Left Ear: Tympanic membrane, ear canal and external ear normal.      Nose: Congestion present.      Mouth/Throat:      Mouth: Mucous membranes are moist.      Pharynx: Oropharynx is clear.   Cardiovascular:      Rate and Rhythm: Normal rate and regular rhythm.      Pulses: Normal pulses.      Heart sounds: Normal heart sounds.   Pulmonary:      Effort: Pulmonary effort is normal.      Breath sounds: Normal breath sounds.   Skin:     General: Skin is warm and dry.      Capillary Refill: Capillary refill takes less than 2 seconds.   Neurological:      General: No focal deficit present.      Mental Status: She is alert and oriented to  person, place, and time. Mental status is at baseline.   Psychiatric:         Mood and Affect: Mood normal.         Behavior: Behavior normal.         Thought Content: Thought content normal.         Judgment: Judgment normal.

## 2025-01-03 RX ORDER — TIRZEPATIDE 10 MG/.5ML
10 INJECTION, SOLUTION SUBCUTANEOUS WEEKLY
Qty: 2 ML | Refills: 0 | Status: SHIPPED | OUTPATIENT
Start: 2025-01-03

## 2025-01-30 RX ORDER — TIRZEPATIDE 12.5 MG/.5ML
12.5 INJECTION, SOLUTION SUBCUTANEOUS WEEKLY
Qty: 2 ML | Refills: 0 | Status: SHIPPED | OUTPATIENT
Start: 2025-01-30

## 2025-02-25 ENCOUNTER — OFFICE VISIT (OUTPATIENT)
Dept: INTERNAL MEDICINE CLINIC | Facility: CLINIC | Age: 35
End: 2025-02-25

## 2025-02-25 VITALS
TEMPERATURE: 98 F | BODY MASS INDEX: 30.63 KG/M2 | DIASTOLIC BLOOD PRESSURE: 70 MMHG | HEIGHT: 64 IN | WEIGHT: 179.4 LBS | SYSTOLIC BLOOD PRESSURE: 106 MMHG | OXYGEN SATURATION: 97 % | HEART RATE: 72 BPM

## 2025-02-25 DIAGNOSIS — F32.A ANXIETY AND DEPRESSION: Primary | ICD-10-CM

## 2025-02-25 DIAGNOSIS — F41.9 ANXIETY AND DEPRESSION: Primary | ICD-10-CM

## 2025-02-25 PROBLEM — R10.13 EPIGASTRIC PAIN: Status: RESOLVED | Noted: 2024-10-29 | Resolved: 2025-02-25

## 2025-02-25 RX ORDER — TIRZEPATIDE 15 MG/.5ML
15 INJECTION, SOLUTION SUBCUTANEOUS WEEKLY
Qty: 2 ML | Refills: 0 | Status: SHIPPED | OUTPATIENT
Start: 2025-02-25

## 2025-02-25 NOTE — PROGRESS NOTES
"Name: Rebecca Erickson      : 1990      MRN: [id number]  Encounter Provider: TAHIR Flores  Encounter Date: 2025   Encounter department: [facility] [place]  :  Assessment & Plan  BMI 39.0-39.9,adult    Orders:    tirzepatide (Zepbound) 15 mg/0.5 mL auto-injector; Inject 0.5 mL (15 mg total) under the skin once a week    Will increase the Zepbound to 15 mg. Tolerating well. Will follow up in the Summer for her wellness.  Anxiety and depression                  History of Present Illness   Rebecca is for a follow up visit. She is doing great on the Zepbound and did loose over 17% of her body weight. She is due for the next dose. She is dieting and is exercising at home. She offers no other issues.      Review of Systems   All other systems reviewed and are negative.      Objective   /70 (BP Location: Left arm, Patient Position: Sitting, Cuff Size: Standard)   Pulse 72   Temp 98 °F (36.7 °C) (Temporal)   Ht 5' 4\" (1.626 m)   Wt 81.4 kg (179 lb 6.4 oz)   SpO2 97%   BMI 30.79 kg/m²      Physical Exam  Vitals reviewed.   Constitutional:       Appearance: Normal appearance. She is normal weight.   Skin:     General: Skin is warm and dry.      Capillary Refill: Capillary refill takes less than 2 seconds.   Neurological:      General: No focal deficit present.      Mental Status: She is alert and oriented to person, place, and time. Mental status is at baseline.   Psychiatric:         Mood and Affect: Mood normal.         Behavior: Behavior normal.         Thought Content: Thought content normal.         Judgment: Judgment normal.         "

## 2025-02-25 NOTE — ASSESSMENT & PLAN NOTE
Orders:    tirzepatide (Zepbound) 15 mg/0.5 mL auto-injector; Inject 0.5 mL (15 mg total) under the skin once a week    Will increase the Zepbound to 15 mg. Tolerating well. Will follow up in the Summer for her wellness.

## 2025-03-13 ENCOUNTER — DOCUMENTATION (OUTPATIENT)
Dept: OTHER | Facility: HOSPITAL | Age: 35
End: 2025-03-13

## 2025-03-13 NOTE — PROGRESS NOTES
Neurology    This patient was scheduled for an EMG study of the left leg and pudendal nerve.  She did not show for the testing .    We do not perform examination of the pudendal  nerve  at [facility]'s neurodiagnostic lab.  We do however evaluate for a lumbar radiculopathy up to S2 and or any other type of more proximal mononeuropathies    If this is required ,a referral could be sent either to [place] and or to a tertiary care center in [place].    Therefore please reevaluate the patient to determine if she would like to reschedule for an EMG study

## 2025-03-28 RX ORDER — TIRZEPATIDE 15 MG/.5ML
15 INJECTION, SOLUTION SUBCUTANEOUS WEEKLY
Qty: 2 ML | Refills: 0 | Status: SHIPPED | OUTPATIENT
Start: 2025-03-28

## 2025-03-28 NOTE — TELEPHONE ENCOUNTER
Rebecca Erickson to  Primary [facility] (supporting TAHIR Flores)   3/26/25  8:20 AM  Good morning,   Can you send another script for zepbound to [facility] please! Thank you!   Rebecca      Medication: tirzepatide (Zepbound) 15 mg/0.5 mL auto-injector     Dose/Frequency: Inject 0.5 mL (15 mg total) under the skin once a week     Quantity: 2 mL    Pharmacy: [facility]ACY #[id number] - [place] - [address]    Office:   [x] PCP/Provider -   [] Speciality/Provider -     Does the patient have enough for 3 days?   [] Yes   [] No - Send as HP to POD

## 2025-03-31 ENCOUNTER — TELEPHONE (OUTPATIENT)
Age: 35
End: 2025-03-31

## 2025-03-31 NOTE — TELEPHONE ENCOUNTER
PA for (Zepbound) 15 mg/0.5 mL SUBMITTED to [employer]    via    [x]CMM-KEY: [id number]  []Surescripts-Case ID #   []Availity-Auth ID # NDC #   []Faxed to plan   []Other website   []Phone call Case ID #     []PA sent as URGENT    All office notes, labs and other pertaining documents and studies sent. Clinical questions answered. Awaiting determination from insurance company.     Turnaround time for your insurance to make a decision on your Prior Authorization can take 7-21 business days.

## 2025-04-01 NOTE — TELEPHONE ENCOUNTER
PA for (Zepbound) 15 mg/0.5 mL APPROVED     Date(s) approved 3/31/2025-9/30/2025    Case #    Patient advised by      - disp 3/28    []MyChart Message  []Phone call   []LMOM  []L/M to call office as no active Communication consent on file  []Unable to leave detailed message as VM not approved on Communication consent       Pharmacy advised by    [x]Fax  []Phone call  []Secure Chat    Specialty Pharmacy    []     Approval letter scanned into Media Yes

## 2025-04-29 ENCOUNTER — OFFICE VISIT (OUTPATIENT)
Dept: INTERNAL MEDICINE CLINIC | Facility: CLINIC | Age: 35
End: 2025-04-29
Payer: COMMERCIAL

## 2025-04-29 VITALS — TEMPERATURE: 98.2 F

## 2025-04-29 DIAGNOSIS — R10.13 EPIGASTRIC PAIN: ICD-10-CM

## 2025-04-29 DIAGNOSIS — K52.9 GASTROENTERITIS: Primary | ICD-10-CM

## 2025-04-29 PROCEDURE — 99213 OFFICE O/P EST LOW 20 MIN: CPT | Performed by: NURSE PRACTITIONER

## 2025-04-29 RX ORDER — ONDANSETRON 4 MG/1
4 TABLET, FILM COATED ORAL EVERY 6 HOURS
Qty: 12 TABLET | Refills: 0 | Status: SHIPPED | OUTPATIENT
Start: 2025-04-29

## 2025-04-29 NOTE — PROGRESS NOTES
Name: Rebecca Erickson      : 1990      MRN: [id number]  Encounter Provider: TAHIR Flores  Encounter Date: 2025   Encounter department: [facility] [place]  :  Assessment & Plan  Gastroenteritis         Epigastric pain    Orders:    ondansetron (ZOFRAN) 4 mg tablet; Take 1 tablet (4 mg total) by mouth every 6 (six) hours    Will call in Zofran as needed. Stay well hydrated follow BRAT diet. If any worsening of symptoms call office       History of Present Illness   Rebecca is for an acute visit. She has been having N/V/D for the past 3 days. She is feeling better today but having some stomach cramping. She is drinking. She offers no other issues.      Review of Systems   Gastrointestinal:  Positive for diarrhea, nausea and vomiting.   All other systems reviewed and are negative.      Objective   There were no vitals taken for this visit.     Physical Exam  Vitals reviewed.   Constitutional:       Appearance: Normal appearance. She is normal weight.   Cardiovascular:      Rate and Rhythm: Normal rate and regular rhythm.   Abdominal:      General: Abdomen is flat. Bowel sounds are normal.      Palpations: Abdomen is soft.   Skin:     General: Skin is warm and dry.      Capillary Refill: Capillary refill takes less than 2 seconds.   Neurological:      General: No focal deficit present.      Mental Status: She is alert and oriented to person, place, and time. Mental status is at baseline.   Psychiatric:         Mood and Affect: Mood normal.         Behavior: Behavior normal.         Thought Content: Thought content normal.         Judgment: Judgment normal.

## 2025-05-05 ENCOUNTER — OFFICE VISIT (OUTPATIENT)
Dept: URGENT CARE | Facility: CLINIC | Age: 35
End: 2025-05-05
Payer: COMMERCIAL

## 2025-05-05 VITALS
RESPIRATION RATE: 16 BRPM | TEMPERATURE: 98 F | DIASTOLIC BLOOD PRESSURE: 68 MMHG | SYSTOLIC BLOOD PRESSURE: 120 MMHG | HEART RATE: 80 BPM | OXYGEN SATURATION: 100 %

## 2025-05-05 DIAGNOSIS — K04.7 DENTAL INFECTION: Primary | ICD-10-CM

## 2025-05-05 PROCEDURE — 99213 OFFICE O/P EST LOW 20 MIN: CPT | Performed by: PHYSICIAN ASSISTANT

## 2025-05-05 RX ORDER — CLINDAMYCIN HYDROCHLORIDE 300 MG/1
300 CAPSULE ORAL 3 TIMES DAILY
Qty: 21 CAPSULE | Refills: 0 | Status: SHIPPED | OUTPATIENT
Start: 2025-05-05 | End: 2025-05-12

## 2025-05-05 RX ORDER — IBUPROFEN 800 MG/1
800 TABLET, FILM COATED ORAL EVERY 8 HOURS PRN
Qty: 30 TABLET | Refills: 0 | Status: SHIPPED | OUTPATIENT
Start: 2025-05-05

## 2025-05-05 NOTE — PROGRESS NOTES
[facility]        NAME: Rebecca Erickson is a 34 y.o. female  : 1990    MRN: [id number]  DATE: May 5, 2025  TIME: 9:50 AM    Assessment and Plan   Dental infection [K04.7]  1. Dental infection  clindamycin (CLEOCIN) 300 MG capsule    ibuprofen (MOTRIN) 800 mg tablet            Patient Instructions     Take medicine as prescribed  F/u with GD and OMS  Follow up with PCP in 3-5 days.  Proceed to  ER if symptoms worsen.    If tests have been performed at [facility], our office will contact you with results if changes need to be made to the care plan discussed with you at the visit.  You can review your full results on [facility].    Chief Complaint     Chief Complaint   Patient presents with    Earache    Jaw Pain     Left jaw pain x 2 days         History of Present Illness       Earache   There is pain in the left ear. This is a new problem. Associated symptoms include coughing (few new onset episodes of cough this am). Pertinent negatives include no hearing loss, rhinorrhea or sore throat. Associated symptoms comments: Reports muffled hearing left ear. Treatments tried: tylenol, motrin, lidocaine.   Patient reports she needs to have left sided wisdom teeth extracted.     Review of Systems   Review of Systems   Constitutional:  Negative for fatigue and fever.   HENT:  Positive for ear pain. Negative for hearing loss, rhinorrhea and sore throat.    Respiratory:  Positive for cough (few new onset episodes of cough this am).          Current Medications       Current Outpatient Medications:     clindamycin (CLEOCIN) 300 MG capsule, Take 1 capsule (300 mg total) by mouth 3 (three) times a day for 7 days, Disp: 21 capsule, Rfl: 0    etonogestrel (NEXPLANON) subdermal implant, 68 mg by Subdermal route Once every 3 years, Disp: , Rfl:     ibuprofen (MOTRIN) 800 mg tablet, Take 1 tablet (800 mg total) by mouth every 8 (eight) hours as needed for mild pain, Disp: 30 tablet, Rfl: 0    tirzepatide  (Zepbound) 15 mg/0.5 mL auto-injector, Inject 0.5 mL (15 mg total) under the skin once a week, Disp: 2 mL, Rfl: 0    ondansetron (ZOFRAN) 4 mg tablet, Take 1 tablet (4 mg total) by mouth every 6 (six) hours, Disp: 12 tablet, Rfl: 0    Current Allergies     Allergies as of 2025 - Reviewed 2025   Allergen Reaction Noted    Penicillin g Hives and Diarrhea 2016            The following portions of the patient's history were reviewed and updated as appropriate: allergies, current medications, past family history, past medical history, past social history, past surgical history and problem list.     Past Medical History:   Diagnosis Date    Allergic     Anxiety     Depression     Irritable bowel syndrome     last assessed: 10/23/2015    Maternal obesity, antepartum     last assessed: 2017    Migraine        Past Surgical History:   Procedure Laterality Date     SECTION      FOOT SURGERY         Family History   Problem Relation Age of Onset    Irritable bowel syndrome Mother     Ovarian cancer Family     Ovarian cancer Family          Medications have been verified.        Objective   /68   Pulse 80   Temp 98 °F (36.7 °C)   Resp 16   SpO2 100%   No LMP recorded.       Physical Exam     Physical Exam  Constitutional:       Appearance: Normal appearance.   HENT:      Right Ear: Tympanic membrane, ear canal and external ear normal. There is no impacted cerumen.      Left Ear: Hearing, tympanic membrane, ear canal and external ear normal. No tenderness. There is no impacted cerumen. Tympanic membrane is not erythematous or bulging.      Nose: No mucosal edema, congestion or rhinorrhea.      Mouth/Throat:      Mouth: Mucous membranes are moist.      Dentition: Dental tenderness (#14) present.      Pharynx: No pharyngeal swelling, oropharyngeal exudate or posterior oropharyngeal erythema.   Cardiovascular:      Rate and Rhythm: Normal rate and regular rhythm.   Pulmonary:      Effort:  Pulmonary effort is normal. No respiratory distress.      Breath sounds: Normal breath sounds.   Neurological:      Mental Status: She is alert.

## 2025-05-09 ENCOUNTER — OFFICE VISIT (OUTPATIENT)
Age: 35
End: 2025-05-09

## 2025-05-09 VITALS
DIASTOLIC BLOOD PRESSURE: 52 MMHG | TEMPERATURE: 98 F | OXYGEN SATURATION: 97 % | HEIGHT: 64 IN | RESPIRATION RATE: 19 BRPM | WEIGHT: 162 LBS | BODY MASS INDEX: 27.66 KG/M2 | SYSTOLIC BLOOD PRESSURE: 96 MMHG | HEART RATE: 96 BPM

## 2025-05-09 DIAGNOSIS — R52 PAIN AGGRAVATED BY EATING OR DRINKING: ICD-10-CM

## 2025-05-09 DIAGNOSIS — K58.2 IRRITABLE BOWEL SYNDROME WITH BOTH CONSTIPATION AND DIARRHEA: ICD-10-CM

## 2025-05-09 DIAGNOSIS — R10.13 EPIGASTRIC PAIN: Primary | ICD-10-CM

## 2025-05-09 DIAGNOSIS — Z80.0 FAMILY HISTORY OF COLON CANCER IN FATHER: ICD-10-CM

## 2025-05-09 RX ORDER — FAMOTIDINE 40 MG/1
40 TABLET, FILM COATED ORAL
Qty: 60 TABLET | Refills: 2 | Status: SHIPPED | OUTPATIENT
Start: 2025-05-09 | End: 2025-05-09 | Stop reason: SDUPTHER

## 2025-05-09 RX ORDER — FAMOTIDINE 40 MG/1
40 TABLET, FILM COATED ORAL 2 TIMES DAILY
Qty: 60 TABLET | Refills: 2 | Status: SHIPPED | OUTPATIENT
Start: 2025-05-09

## 2025-05-09 RX ORDER — TIRZEPATIDE 15 MG/.5ML
15 INJECTION, SOLUTION SUBCUTANEOUS WEEKLY
Qty: 2 ML | Refills: 0 | Status: SHIPPED | OUTPATIENT
Start: 2025-05-09

## 2025-05-09 NOTE — ASSESSMENT & PLAN NOTE
With the patient that at the very least with her father having colon cancer, pancreatic cancer, and upper GI cancer, with the primary being unclear, we would at the very least consider starting to perform her routine colon cancer screening at the age of 42 or sooner if she starts to develop any kind of red flag symptoms.  I discussed with the patient, that at this point I do not appreciate any significant changes in her bowel habits or any red flag symptoms that would warrant a colonoscopy at this time.  However, if her symptoms should change, worsen, or she would start to experience red flag symptoms in the future, we would consider a colonoscopy in the near future as well.  The patient was agreeable and verbalized an understanding.    Continue to watch for red flag symptoms.    While the patient was in the office today we did review GI red flag symptoms, including, but not limited to: chronic nausea, vomiting, diarrhea, chills, fever, and unintentional weight loss and should call or contact our office with any changes or concerns. I reviewed with the patient that if they notice any blood while vomiting or in their stool they should contact or office or go to the nearest emergency room for immediate evaluation. The patient was agreeable and verbalized an understanding.

## 2025-05-09 NOTE — ASSESSMENT & PLAN NOTE
Orders:  •  US right upper quadrant; Future  •  NM hepatobiliary w rx; Future  •  famotidine (PEPCID) 40 MG tablet; Take 1 tablet (40 mg total) by mouth 2 (two) times a day

## 2025-05-09 NOTE — PATIENT INSTRUCTIONS
Proceed with RUQ U/S.   Proceed with HIDA scan.   Start Famotidine 40 mg, 1 pill 2 x daily.   Continue to avoid acidic or trigger foods as much as possible.   Continue to drink at least 64 oz of water daily.   Continue to watch for red flag symptoms.   Schedule a f/u OV in 2 months.     We did review GI red flag symptoms, including, but not limited to: chronic nausea, vomiting, diarrhea, chills, fever, and unintentional weight loss and should call or contact our office with any changes or concerns. I reviewed with the patient that if they notice any blood while vomiting or in their stool they should contact or office or go to the nearest emergency room for immediate evaluation. The patient was agreeable and verbalized an understanding.

## 2025-05-09 NOTE — PROGRESS NOTES
Name: Rebecca Erickson      : 1990      MRN: [id number]  Encounter Provider: TAHIR Dubose  Encounter Date: 2025   Encounter department: [facility]  :  Assessment & Plan  Epigastric pain  While the patient was in the office today, I did have a thorough conversation with the patient regarding her epigastric pain and postprandial pain and that at this point I am suspicious as underlying gallbladder disease as one of her differential diagnoses as well as an underlying component of GERD.  At this point I feel it would be beneficial to proceed with a right upper quadrant ultrasound as well as a HIDA scan to better evaluate the gallbladder and any other underlying etiology that could explain her symptoms.  I advised the patient that once we have the results of the ultrasound and the HIDA scan, our office will contact her to review results and discuss the next definite treatment plan, which may include a referral to general surgery to discuss a possible cholecystectomy if appropriate.  The patient was agreeable and verbalized an understanding.    To try to address her current epigastric pain and underlying possible GERD symptoms we will start her on famotidine 40 mg, 1 p.o. twice daily and see how she does over the next 2 months.  I advised the patient that if she experiences any side effects or issues with the changes in her medication regimen, she should contact our office.  The patient was agreeable and verbalized an understanding.    I also discussed with the patient that at this point we will hold off on an EGD, however, if her symptoms should fail to improve or would worsen and her imaging and HIDA scan are normal we would consider an EGD in the near future.  The patient was agreeable and verbalized an understanding.    Encouraged the patient to continue to avoid acidic or trigger foods is much as possible.  Orders:  •  Ambulatory Referral to  Gastroenterology  •  US right upper quadrant; Future  •  NM hepatobiliary w rx; Future  •  famotidine (PEPCID) 40 MG tablet; Take 1 tablet (40 mg total) by mouth 2 (two) times a day    Pain aggravated by eating or drinking  Orders:  •  US right upper quadrant; Future  •  NM hepatobiliary w rx; Future  •  famotidine (PEPCID) 40 MG tablet; Take 1 tablet (40 mg total) by mouth 2 (two) times a day    Family history of colon cancer in father  With the patient that at the very least with her father having colon cancer, pancreatic cancer, and upper GI cancer, with the primary being unclear, we would at the very least consider starting to perform her routine colon cancer screening at the age of 42 or sooner if she starts to develop any kind of red flag symptoms.  I discussed with the patient, that at this point I do not appreciate any significant changes in her bowel habits or any red flag symptoms that would warrant a colonoscopy at this time.  However, if her symptoms should change, worsen, or she would start to experience red flag symptoms in the future, we would consider a colonoscopy in the near future as well.  The patient was agreeable and verbalized an understanding.    Continue to watch for red flag symptoms.    While the patient was in the office today we did review GI red flag symptoms, including, but not limited to: chronic nausea, vomiting, diarrhea, chills, fever, and unintentional weight loss and should call or contact our office with any changes or concerns. I reviewed with the patient that if they notice any blood while vomiting or in their stool they should contact or office or go to the nearest emergency room for immediate evaluation. The patient was agreeable and verbalized an understanding.       Irritable bowel syndrome with both constipation and diarrhea  Continue to drink at least 64 ounces of water daily.       The patient is to schedule a follow up office visit in 2 months, but understands to call  or contact our offices with any issues before then or as needed.     History of Present Illness   Rebecca Erickson is a 34 y.o. female who presents today for her initial consultation and evaluation for her worsening epigastric pain, postprandial pain, history of irritable bowel syndrome with both constipation and diarrhea at times, and family history of colon cancer.  The patient reports that over the past several weeks she has had worsening epigastric pain and GERD symptoms.  The patient reports that it does not matter what she eats and that it is typically always shortly after eating she has pain and discomfort.  She also reports intermittent nausea, but denies any vomiting.  The patient reports that she has been diagnosed with irritable bowel syndrome since her teenage years after she had a colonoscopy and EGD that were normal at that time.  She reports that the symptoms and pain is definitely different than the irritable bowel syndrome symptoms.    The patient also reports she has become a little more concerned than normal because her father was just diagnosed with pancreatic, upper GI, and colon cancer in November/December of last year and it just makes her nervous.    The patient currently denies any reflux, dysphagia, vomiting, decreased appetite, or unplanned weight loss. Water Intake: 10 bottles daily.     The patient currently reports that they have a BM usually daily and reports that it is relieving, without any consistent diarrhea, constipation, straining, melena or bloody stools. Last BM: Today. Flatus: Sometimes, depends on what she eats.    PMH/PSH:   Abdominal/Chest Surgery:  x 1.   Colon Cancer: Denied  Any Cancer: Denied  Pre-Cancerous Polyps: Denied  Crohn's: Deneied  Ulcerative Colitis: Denied   Pérez's Esophagus: Denied  Celiac Disease: Denied  Liver Disease: Denied    Tobacco/Vaping: Denied  ETOH: Denied  Marijuana: Denied  Illicit Drug Use: Denied    FH:  Colon Cancer: Father 57  Any  "Cancer: Father; Pancreatic, Maternal Grandmother; Ovarian  Family Members with Pre-Cancerous Polyps: Yes  Crohn's: Denied  Ulcerative Colitis: Mother  Celiac Disease: Denied  Liver Disease: Denied    GI Meds: Zofran as needed.  Daily NSAID Use: Denied  Daily Tylenol Use: Denied  Any New Meds: Zepbound in October 2024.     Imaging: (10/30/24) CT of the abdomen pelvis with contrast: Normal.    Endoscopy History: EGD: (At age 16):  Normal per patient no reports to review.     COLONOSCOPY: (At age 16): Normal per patient no reports to review.     HPI  History obtained from: patient  Review of Systems A complete review of systems is negative other than that noted above in the HPI.      Current Outpatient Medications   Medication Sig Dispense Refill   • clindamycin (CLEOCIN) 300 MG capsule Take 1 capsule (300 mg total) by mouth 3 (three) times a day for 7 days 21 capsule 0   • etonogestrel (NEXPLANON) subdermal implant 68 mg by Subdermal route Once every 3 years     • famotidine (PEPCID) 40 MG tablet Take 1 tablet (40 mg total) by mouth 2 (two) times a day 60 tablet 2   • ibuprofen (MOTRIN) 800 mg tablet Take 1 tablet (800 mg total) by mouth every 8 (eight) hours as needed for mild pain 30 tablet 0   • ondansetron (ZOFRAN) 4 mg tablet Take 1 tablet (4 mg total) by mouth every 6 (six) hours 12 tablet 0   • tirzepatide (Zepbound) 15 mg/0.5 mL auto-injector Inject 0.5 mL (15 mg total) under the skin once a week 2 mL 0     No current facility-administered medications for this visit.     Objective   BP 96/52 (BP Location: Left arm, Patient Position: Sitting, Cuff Size: Adult)   Pulse 96   Temp 98 °F (36.7 °C) (Temporal)   Resp 19   Ht 5' 4\" (1.626 m)   Wt 73.5 kg (162 lb)   SpO2 97%   BMI 27.81 kg/m²     Physical Exam   Sclera without icterus and benign. Lung sounds clear to auscultation b/l. Normal S1 & S2 upon exam. Abdomen is soft, nondistended, with mild tenderness and upon exam in the epigastric region as well as " the right upper quadrant, without any significant guarding or rebound tenderness and upon exam, with faint bowel sounds x 4.  No edema noted of the b/l lower extremities upon exam today. Skin is non-icteric.     Lab Results: I personally reviewed relevant lab results.

## 2025-05-12 ENCOUNTER — HOSPITAL ENCOUNTER (EMERGENCY)
Facility: HOSPITAL | Age: 35
Discharge: HOME | End: 2025-05-12
Attending: INTERNAL MEDICINE
Payer: COMMERCIAL

## 2025-05-12 ENCOUNTER — HOSPITAL ENCOUNTER (OUTPATIENT)
Dept: ULTRASOUND IMAGING | Facility: HOSPITAL | Age: 35
Discharge: HOME | End: 2025-05-12
Attending: NURSE PRACTITIONER
Payer: COMMERCIAL

## 2025-05-12 VITALS
BODY MASS INDEX: 27.66 KG/M2 | DIASTOLIC BLOOD PRESSURE: 91 MMHG | WEIGHT: 162 LBS | SYSTOLIC BLOOD PRESSURE: 145 MMHG | HEIGHT: 64 IN | TEMPERATURE: 98 F | RESPIRATION RATE: 18 BRPM | HEART RATE: 77 BPM | OXYGEN SATURATION: 100 %

## 2025-05-12 DIAGNOSIS — K08.89 PAIN, DENTAL: Primary | ICD-10-CM

## 2025-05-12 DIAGNOSIS — R52 PAIN AGGRAVATED BY EATING OR DRINKING: ICD-10-CM

## 2025-05-12 DIAGNOSIS — R10.13 EPIGASTRIC PAIN: ICD-10-CM

## 2025-05-12 PROCEDURE — 99282 EMERGENCY DEPT VISIT SF MDM: CPT

## 2025-05-12 PROCEDURE — 76705 ECHO EXAM OF ABDOMEN: CPT

## 2025-05-12 PROCEDURE — 99284 EMERGENCY DEPT VISIT MOD MDM: CPT | Performed by: INTERNAL MEDICINE

## 2025-05-12 PROCEDURE — 96372 THER/PROPH/DIAG INJ SC/IM: CPT

## 2025-05-12 RX ORDER — TRAMADOL HYDROCHLORIDE 50 MG/1
50 TABLET ORAL EVERY 6 HOURS PRN
Qty: 12 TABLET | Refills: 0 | Status: SHIPPED | OUTPATIENT
Start: 2025-05-12 | End: 2025-05-21

## 2025-05-12 RX ORDER — GABAPENTIN 100 MG
100 CAPSULE ORAL 3 TIMES DAILY
Qty: 21 CAPSULE | Refills: 0 | Status: SHIPPED | OUTPATIENT
Start: 2025-05-12 | End: 2025-05-21

## 2025-05-12 RX ORDER — KETOROLAC TROMETHAMINE 30 MG/ML
15 INJECTION, SOLUTION INTRAMUSCULAR; INTRAVENOUS ONCE
Status: COMPLETED | OUTPATIENT
Start: 2025-05-12 | End: 2025-05-12

## 2025-05-12 RX ORDER — TRAMADOL HYDROCHLORIDE 50 MG/1
50 TABLET ORAL ONCE
Status: COMPLETED | OUTPATIENT
Start: 2025-05-12 | End: 2025-05-12

## 2025-05-12 RX ADMIN — KETOROLAC TROMETHAMINE 15 MG: 30 INJECTION, SOLUTION INTRAMUSCULAR at 23:08

## 2025-05-12 RX ADMIN — TRAMADOL HYDROCHLORIDE 50 MG: 50 TABLET, COATED ORAL at 23:34

## 2025-05-13 NOTE — ED PROVIDER NOTES
ED Disposition       None          Assessment & Plan   {Hyperlinks  Risk Stratification - NIHSS - HEART SCORE - Fill out sepsis note and make sure you call [phone] if severe or septic shock:[id number]}    Medical Decision Making  Patient with left upper molar third molar pain.  Exam is negative unremarkable.  Patient recommended to continue taking clindamycin and follow-up with her dentist I believe this is possibly impacted wisdom teeth -patient still has her wisdom teeth.    Risk  Prescription drug management.             Medications - No data to display    ED Risk Strat Scores                    No data recorded        SBIRT 20yo+      Flowsheet Row Most Recent Value   Initial Alcohol Screen: US AUDIT-C     1. How often do you have a drink containing alcohol? 0 Filed at: 2025   2. How many drinks containing alcohol do you have on a typical day you are drinking?  0 Filed at: 2025   3b. FEMALE Any Age, or MALE 65+: How often do you have 4 or more drinks on one occassion? 0 Filed at: 2025   Audit-C Score 0 Filed at: 2025   VIKY: How many times in the past year have you...    Used an illegal drug or used a prescription medication for non-medical reasons? Never Filed at: 2025                            History of Present Illness   {Hyperlinks  History (Med, Surg, Fam, Social) - Current Medications - Allergies  :[id number]}    No chief complaint on file.      Past Medical History:   Diagnosis Date    Allergic     Anxiety     Depression     Irritable bowel syndrome     last assessed: 10/23/2015    Maternal obesity, antepartum     last assessed: 2017    Migraine       Past Surgical History:   Procedure Laterality Date     SECTION      FOOT SURGERY        Family History   Problem Relation Age of Onset    Irritable bowel syndrome Mother     Ovarian cancer Family     Ovarian cancer Family       Social History     Tobacco Use    Smoking status: Never     Smokeless tobacco: Never   Vaping Use    Vaping status: Never Used   Substance Use Topics    Alcohol use: No    Drug use: No      E-Cigarette/Vaping    E-Cigarette Use Never User       E-Cigarette/Vaping Substances    Nicotine No     THC No     CBD No     Flavoring No     Other No     Unknown No       I have reviewed and agree with the history as documented.     34-year-old female presents emergency room with chief complaint of left upper dental pain.  Patient was seen in urgent care approximately 70 days ago and placed on a 10-day course of clindamycin for infected tooth.  Patient send no fever, no drainage no sore throat she does have pain radiating to her left ear.  She also points to her maxillary region and states that the pain seems to shoot back-and patient is to her temporomandibular joint space.  Patient has no dysphagia, sore throat, pain does radiate to her left ear        Review of Systems   HENT:  Positive for dental problem. Negative for ear discharge, ear pain, facial swelling, hearing loss, mouth sores, postnasal drip, sinus pressure, sinus pain, sneezing, sore throat, tinnitus, trouble swallowing and voice change.    Eyes: Negative.    Respiratory: Negative.     Cardiovascular: Negative.    Skin: Negative.    Psychiatric/Behavioral: Negative.             Objective   {Hyperlinks  Historical Vitals - Historical Labs - Chart Review/Microbiology - Last Echo - Code Status  :[id number]}    ED Triage Vitals [05/12/25 2249]   Temperature Pulse Blood Pressure Respirations SpO2 Patient Position - Orthostatic VS   98 °F (36.7 °C) 77 145/91 18 100 % Lying      Temp Source Heart Rate Source BP Location FiO2 (%) Pain Score    Temporal Monitor Left arm -- 10 - Worst Possible Pain      Vitals      Date and Time Temp Pulse SpO2 Resp BP Pain Score FACES Pain Rating User   05/12/25 2250 -- -- 100 % -- -- -- -- MC   05/12/25 2249 98 °F (36.7 °C) 77 100 % 18 145/91 10 - Worst Possible Pain --             Physical  Exam  Vitals and nursing note reviewed.   Constitutional:       General: She is not in acute distress.     Appearance: Normal appearance. She is not ill-appearing.   HENT:      Head: Normocephalic and atraumatic.      Right Ear: Tympanic membrane, ear canal and external ear normal. There is no impacted cerumen.      Left Ear: Tympanic membrane, ear canal and external ear normal. There is no impacted cerumen.      Nose: Nose normal.      Mouth/Throat:      Mouth: Mucous membranes are moist.      Pharynx: No oropharyngeal exudate or posterior oropharyngeal erythema.      Comments: Patient's dental examination is unremarkable.  When applying pressure to the patient's region of discomfort there is no pain on the teeth there is no drainage there is no erythema in the gum no foul-smelling breath.  The exam appears normal.  Eyes:      Extraocular Movements: Extraocular movements intact.      Conjunctiva/sclera: Conjunctivae normal.   Cardiovascular:      Rate and Rhythm: Normal rate and regular rhythm.   Pulmonary:      Effort: Pulmonary effort is normal.      Breath sounds: Normal breath sounds.   Musculoskeletal:      Cervical back: Normal range of motion and neck supple.   Lymphadenopathy:      Cervical: No cervical adenopathy.   Skin:     General: Skin is warm and dry.   Neurological:      General: No focal deficit present.      Mental Status: She is alert and oriented to person, place, and time.   Psychiatric:         Mood and Affect: Mood normal.         Behavior: Behavior normal.         Thought Content: Thought content normal.         Judgment: Judgment normal.         Results Reviewed       None            No orders to display       Procedures    ED Medication and Procedure Management   Prior to Admission Medications   Prescriptions Last Dose Informant Patient Reported? Taking?   clindamycin (CLEOCIN) 300 MG capsule 5/12/2025  No Yes   Sig: Take 1 capsule (300 mg total) by mouth 3 (three) times a day for 7 days    etonogestrel (NEXPLANON) subdermal implant   Yes No   Si mg by Subdermal route Once every 3 years   famotidine (PEPCID) 40 MG tablet   No No   Sig: Take 1 tablet (40 mg total) by mouth 2 (two) times a day   ibuprofen (MOTRIN) 800 mg tablet   No No   Sig: Take 1 tablet (800 mg total) by mouth every 8 (eight) hours as needed for mild pain   ondansetron (ZOFRAN) 4 mg tablet   No No   Sig: Take 1 tablet (4 mg total) by mouth every 6 (six) hours   tirzepatide (Zepbound) 15 mg/0.5 mL auto-injector   No No   Sig: Inject 0.5 mL (15 mg total) under the skin once a week      Facility-Administered Medications: None     Patient's Medications   Discharge Prescriptions    No medications on file     No discharge procedures on file.  ED SEPSIS DOCUMENTATION

## 2025-05-17 ENCOUNTER — RESULTS FOLLOW-UP (OUTPATIENT)
Dept: GASTROENTEROLOGY | Facility: CLINIC | Age: 35
End: 2025-05-17

## 2025-05-20 ENCOUNTER — APPOINTMENT (INPATIENT)
Dept: MRI IMAGING | Facility: HOSPITAL | Age: 35
DRG: 111 | End: 2025-05-20
Payer: COMMERCIAL

## 2025-05-20 ENCOUNTER — APPOINTMENT (EMERGENCY)
Dept: CT IMAGING | Facility: HOSPITAL | Age: 35
DRG: 111 | End: 2025-05-20
Payer: COMMERCIAL

## 2025-05-20 ENCOUNTER — HOSPITAL ENCOUNTER (INPATIENT)
Facility: HOSPITAL | Age: 35
LOS: 1 days | Discharge: HOME | DRG: 111 | End: 2025-05-21
Attending: FAMILY MEDICINE | Admitting: INTERNAL MEDICINE
Payer: COMMERCIAL

## 2025-05-20 DIAGNOSIS — R11.10 VOMITING: ICD-10-CM

## 2025-05-20 DIAGNOSIS — R42 DIZZINESS: Primary | ICD-10-CM

## 2025-05-20 DIAGNOSIS — R11.0 NAUSEA: ICD-10-CM

## 2025-05-20 PROBLEM — K08.89 PAIN, DENTAL: Status: ACTIVE | Noted: 2025-05-20

## 2025-05-20 LAB
ANION GAP SERPL CALCULATED.3IONS-SCNC: 7 MMOL/L (ref 4–13)
ATRIAL RATE: 73 BPM
BASOPHILS # BLD AUTO: 0.04 THOUSANDS/ÂΜL (ref 0–0.1)
BASOPHILS NFR BLD AUTO: 0 % (ref 0–1)
BUN SERPL-MCNC: 19 MG/DL (ref 5–25)
CALCIUM SERPL-MCNC: 8.6 MG/DL (ref 8.4–10.2)
CHLORIDE SERPL-SCNC: 106 MMOL/L (ref 96–108)
CO2 SERPL-SCNC: 23 MMOL/L (ref 21–32)
CREAT SERPL-MCNC: 0.65 MG/DL (ref 0.6–1.3)
EOSINOPHIL # BLD AUTO: 0.14 THOUSAND/ÂΜL (ref 0–0.61)
EOSINOPHIL NFR BLD AUTO: 1 % (ref 0–6)
ERYTHROCYTE [DISTWIDTH] IN BLOOD BY AUTOMATED COUNT: 11.9 % (ref 11.6–15.1)
EXT PREGNANCY TEST URINE: NEGATIVE
EXT. CONTROL: NORMAL
GFR SERPL CREATININE-BSD FRML MDRD: 117 ML/MIN/1.73SQ M
GLUCOSE SERPL-MCNC: 98 MG/DL (ref 65–140)
HCT VFR BLD AUTO: 36.9 % (ref 34.8–46.1)
HGB BLD-MCNC: 12 G/DL (ref 11.5–15.4)
IMM GRANULOCYTES # BLD AUTO: 0.05 THOUSAND/UL (ref 0–0.2)
IMM GRANULOCYTES NFR BLD AUTO: 1 % (ref 0–2)
LYMPHOCYTES # BLD AUTO: 1.44 THOUSANDS/ÂΜL (ref 0.6–4.47)
LYMPHOCYTES NFR BLD AUTO: 14 % (ref 14–44)
MCH RBC QN AUTO: 30.2 PG (ref 26.8–34.3)
MCHC RBC AUTO-ENTMCNC: 32.5 G/DL (ref 31.4–37.4)
MCV RBC AUTO: 93 FL (ref 82–98)
MONOCYTES # BLD AUTO: 0.37 THOUSAND/ÂΜL (ref 0.17–1.22)
MONOCYTES NFR BLD AUTO: 4 % (ref 4–12)
NEUTROPHILS # BLD AUTO: 8.44 THOUSANDS/ÂΜL (ref 1.85–7.62)
NEUTS SEG NFR BLD AUTO: 81 % (ref 43–75)
P AXIS: 75 DEGREES
PLATELET # BLD AUTO: 283 THOUSANDS/UL (ref 149–390)
PMV BLD AUTO: 9.8 FL (ref 8.9–12.7)
POTASSIUM SERPL-SCNC: 3.8 MMOL/L (ref 3.5–5.3)
PR INTERVAL: 160 MS
QRS AXIS: 61 DEGREES
QRSD INTERVAL: 76 MS
QT INTERVAL: 388 MS
QTC INTERVAL: 427 MS
RBC # BLD AUTO: 3.98 MILLION/UL (ref 3.81–5.12)
SODIUM SERPL-SCNC: 136 MMOL/L (ref 135–147)
T WAVE AXIS: 78 DEGREES
VENTRICULAR RATE: 73 BPM
WBC # BLD AUTO: 10.48 THOUSAND/UL (ref 4.31–10.16)

## 2025-05-20 PROCEDURE — 80048 BASIC METABOLIC PNL TOTAL CA: CPT

## 2025-05-20 PROCEDURE — 99285 EMERGENCY DEPT VISIT HI MDM: CPT | Performed by: FAMILY MEDICINE

## 2025-05-20 PROCEDURE — 36415 COLL VENOUS BLD VENIPUNCTURE: CPT

## 2025-05-20 PROCEDURE — 85025 COMPLETE CBC W/AUTO DIFF WBC: CPT

## 2025-05-20 PROCEDURE — 93005 ELECTROCARDIOGRAM TRACING: CPT

## 2025-05-20 PROCEDURE — 70498 CT ANGIOGRAPHY NECK: CPT

## 2025-05-20 PROCEDURE — 81025 URINE PREGNANCY TEST: CPT

## 2025-05-20 PROCEDURE — 99223 1ST HOSP IP/OBS HIGH 75: CPT | Performed by: INTERNAL MEDICINE

## 2025-05-20 PROCEDURE — 96374 THER/PROPH/DIAG INJ IV PUSH: CPT

## 2025-05-20 PROCEDURE — 70496 CT ANGIOGRAPHY HEAD: CPT

## 2025-05-20 PROCEDURE — 96361 HYDRATE IV INFUSION ADD-ON: CPT

## 2025-05-20 PROCEDURE — 99285 EMERGENCY DEPT VISIT HI MDM: CPT

## 2025-05-20 PROCEDURE — 93010 ELECTROCARDIOGRAM REPORT: CPT | Performed by: INTERNAL MEDICINE

## 2025-05-20 PROCEDURE — 70551 MRI BRAIN STEM W/O DYE: CPT

## 2025-05-20 RX ORDER — CLINDAMYCIN HYDROCHLORIDE 300 MG/1
CAPSULE ORAL
COMMUNITY
Start: 2025-05-16

## 2025-05-20 RX ORDER — MECLIZINE HCL 12.5 MG 12.5 MG/1
12.5 TABLET ORAL 3 TIMES DAILY PRN
Qty: 30 TABLET | Refills: 0 | Status: CANCELLED | OUTPATIENT
Start: 2025-05-20

## 2025-05-20 RX ORDER — SODIUM CHLORIDE, SODIUM GLUCONATE, SODIUM ACETATE, POTASSIUM CHLORIDE, MAGNESIUM CHLORIDE, SODIUM PHOSPHATE, DIBASIC, AND POTASSIUM PHOSPHATE .53; .5; .37; .037; .03; .012; .00082 G/100ML; G/100ML; G/100ML; G/100ML; G/100ML; G/100ML; G/100ML
100 INJECTION, SOLUTION INTRAVENOUS ONCE
Status: COMPLETED | OUTPATIENT
Start: 2025-05-20 | End: 2025-05-21

## 2025-05-20 RX ORDER — ATORVASTATIN CALCIUM 40 MG/1
40 TABLET, FILM COATED ORAL EVERY EVENING
Status: DISCONTINUED | OUTPATIENT
Start: 2025-05-21 | End: 2025-05-21 | Stop reason: HOSPADM

## 2025-05-20 RX ORDER — ONDANSETRON 2 MG/ML
4 INJECTION INTRAMUSCULAR; INTRAVENOUS ONCE
Status: COMPLETED | OUTPATIENT
Start: 2025-05-20 | End: 2025-05-20

## 2025-05-20 RX ORDER — ENOXAPARIN SODIUM 100 MG/ML
40 INJECTION SUBCUTANEOUS DAILY
Status: DISCONTINUED | OUTPATIENT
Start: 2025-05-20 | End: 2025-05-21 | Stop reason: HOSPADM

## 2025-05-20 RX ORDER — FAMOTIDINE 20 MG/1
40 TABLET, FILM COATED ORAL 2 TIMES DAILY
Status: DISCONTINUED | OUTPATIENT
Start: 2025-05-20 | End: 2025-05-21 | Stop reason: HOSPADM

## 2025-05-20 RX ORDER — ONDANSETRON 4 MG/1
4 TABLET, FILM COATED ORAL EVERY 8 HOURS PRN
Qty: 20 TABLET | Refills: 0 | Status: CANCELLED | OUTPATIENT
Start: 2025-05-20

## 2025-05-20 RX ORDER — CLINDAMYCIN HYDROCHLORIDE 150 MG/1
300 CAPSULE ORAL 3 TIMES DAILY
Status: DISCONTINUED | OUTPATIENT
Start: 2025-05-20 | End: 2025-05-21 | Stop reason: HOSPADM

## 2025-05-20 RX ORDER — ACETAMINOPHEN 325 MG/1
650 TABLET ORAL EVERY 6 HOURS PRN
Status: DISCONTINUED | OUTPATIENT
Start: 2025-05-20 | End: 2025-05-21 | Stop reason: HOSPADM

## 2025-05-20 RX ORDER — MECLIZINE HCL 12.5 MG 12.5 MG/1
25 TABLET ORAL ONCE
Status: COMPLETED | OUTPATIENT
Start: 2025-05-20 | End: 2025-05-20

## 2025-05-20 RX ORDER — ONDANSETRON 2 MG/ML
4 INJECTION INTRAMUSCULAR; INTRAVENOUS EVERY 6 HOURS PRN
Status: DISCONTINUED | OUTPATIENT
Start: 2025-05-20 | End: 2025-05-21 | Stop reason: HOSPADM

## 2025-05-20 RX ORDER — ASPIRIN 81 MG/1
81 TABLET, CHEWABLE ORAL DAILY
Status: DISCONTINUED | OUTPATIENT
Start: 2025-05-21 | End: 2025-05-21 | Stop reason: HOSPADM

## 2025-05-20 RX ADMIN — FAMOTIDINE 40 MG: 20 TABLET, FILM COATED ORAL at 18:43

## 2025-05-20 RX ADMIN — IOHEXOL 75 ML: 350 INJECTION, SOLUTION INTRAVENOUS at 11:00

## 2025-05-20 RX ADMIN — MECLIZINE HYDROCHLORIDE 25 MG: 12.5 TABLET ORAL at 09:52

## 2025-05-20 RX ADMIN — CLINDAMYCIN HYDROCHLORIDE 300 MG: 150 CAPSULE ORAL at 20:40

## 2025-05-20 RX ADMIN — ONDANSETRON 4 MG: 2 INJECTION INTRAMUSCULAR; INTRAVENOUS at 09:57

## 2025-05-20 RX ADMIN — SODIUM CHLORIDE 1000 ML: 0.9 INJECTION, SOLUTION INTRAVENOUS at 09:57

## 2025-05-20 RX ADMIN — ENOXAPARIN SODIUM 40 MG: 40 INJECTION SUBCUTANEOUS at 16:08

## 2025-05-20 RX ADMIN — SODIUM CHLORIDE, SODIUM GLUCONATE, SODIUM ACETATE, POTASSIUM CHLORIDE, MAGNESIUM CHLORIDE, SODIUM PHOSPHATE, DIBASIC, AND POTASSIUM PHOSPHATE 100 ML/HR: .53; .5; .37; .037; .03; .012; .00082 INJECTION, SOLUTION INTRAVENOUS at 16:08

## 2025-05-20 RX ADMIN — CLINDAMYCIN HYDROCHLORIDE 300 MG: 150 CAPSULE ORAL at 16:08

## 2025-05-20 NOTE — ASSESSMENT & PLAN NOTE
Patient scheduled for outpatient dental/OMFS evaluation  Continue clindamycin to complete course of therapy (patient was on this medication prior to admission)

## 2025-05-20 NOTE — PLAN OF CARE
Problem: Potential for Falls  Goal: Patient will remain free of falls  Description: INTERVENTIONS:  - Educate patient/family on patient safety including physical limitations  - Instruct patient to call for assistance with activity   - Consider consulting OT/PT to assist with strengthening/mobility based on AM PAC & JH-HLM score  - Consult OT/PT to assist with strengthening/mobility   - Keep Call bell within reach  - Keep bed low and locked with side rails adjusted as appropriate  - Keep care items and personal belongings within reach  - Initiate and maintain comfort rounds  - Make Fall Risk Sign visible to staff  - Offer Toileting every 2 Hours, in advance of need  - Initiate/Maintain n/aalarm  - Obtain necessary fall risk management equipment: nonslip socks   - Apply yellow socks and bracelet for high fall risk patients  - Consider moving patient to room near nurses station  Outcome: Progressing     Problem: PAIN - ADULT  Goal: Verbalizes/displays adequate comfort level or baseline comfort level  Description: Interventions:  - Encourage patient to monitor pain and request assistance  - Assess pain using appropriate pain scale  - Administer analgesics as ordered based on type and severity of pain and evaluate response  - Implement non-pharmacological measures as appropriate and evaluate response  - Consider cultural and social influences on pain and pain management  - Notify physician/advanced practitioner if interventions unsuccessful or patient reports new pain  - Educate patient/family on pain management process including their role and importance of  reporting pain   - Provide non-pharmacologic/complimentary pain relief interventions  Outcome: Progressing     Problem: INFECTION - ADULT  Goal: Absence or prevention of progression during hospitalization  Description: INTERVENTIONS:  - Assess and monitor for signs and symptoms of infection  - Monitor lab/diagnostic results  - Monitor all insertion sites, i.e.  indwelling lines, tubes, and drains  - Monitor endotracheal if appropriate and nasal secretions for changes in amount and color  - [place] appropriate cooling/warming therapies per order  - Administer medications as ordered  - Instruct and encourage patient and family to use good hand hygiene technique  - Identify and instruct in appropriate isolation precautions for identified infection/condition  Outcome: Progressing  Goal: Absence of fever/infection during neutropenic period  Description: INTERVENTIONS:  - Monitor WBC  - Perform strict hand hygiene  - Limit to healthy visitors only  - No plants, dried, fresh or silk flowers with hassan in patient room  Outcome: Progressing     Problem: SAFETY ADULT  Goal: Patient will remain free of falls  Description: INTERVENTIONS:  - Educate patient/family on patient safety including physical limitations  - Instruct patient to call for assistance with activity   - Consider consulting OT/PT to assist with strengthening/mobility based on AM PAC & -HLM score  - Consult OT/PT to assist with strengthening/mobility   - Keep Call bell within reach  - Keep bed low and locked with side rails adjusted as appropriate  - Keep care items and personal belongings within reach  - Initiate and maintain comfort rounds  - Make Fall Risk Sign visible to staff  - Offer Toileting every 2 Hours, in advance of need  - Initiate/Maintain n/a alarm  - Obtain necessary fall risk management equipment: nonslip socks   - Apply yellow socks and bracelet for high fall risk patients  - Consider moving patient to room near nurses station  Outcome: Progressing  Goal: Maintain or return to baseline ADL function  Description: INTERVENTIONS:  -  Assess patient's ability to carry out ADLs; assess patient's baseline for ADL function and identify physical deficits which impact ability to perform ADLs (bathing, care of mouth/teeth, toileting, grooming, dressing, etc.)  - Assess/evaluate cause of self-care deficits    - Assess range of motion  - Assess patient's mobility; develop plan if impaired  - Assess patient's need for assistive devices and provide as appropriate  - Encourage maximum independence but intervene and supervise when necessary  - Involve family in performance of ADLs  - Assess for home care needs following discharge   - Consider OT consult to assist with ADL evaluation and planning for discharge  - Provide patient education as appropriate  - Monitor functional capacity and physical performance, use of AM PAC & JH-HLM   - Monitor gait, balance and fatigue with ambulation    Outcome: Progressing  Goal: Maintains/Returns to pre admission functional level  Description: INTERVENTIONS:  - Perform AM-PAC 6 Click Basic Mobility/ Daily Activity assessment daily.  - Set and communicate daily mobility goal to care team and patient/family/caregiver.   - Collaborate with rehabilitation services on mobility goals if consulted  - Perform Range of Motion 3 times a day.  - Reposition patient every 2 hours.  - Dangle patient 3 times a day  - Stand patient 3 times a day  - Ambulate patient 3 times a day  - Out of bed to chair 3 times a day   - Out of bed for meals 3 times a day  - Out of bed for toileting  - Record patient progress and toleration of activity level   Outcome: Progressing     Problem: DISCHARGE PLANNING  Goal: Discharge to home or other facility with appropriate resources  Description: INTERVENTIONS:  - Identify barriers to discharge w/patient and caregiver  - Arrange for needed discharge resources and transportation as appropriate  - Identify discharge learning needs (meds, wound care, etc.)  - Arrange for interpretive services to assist at discharge as needed  - Refer to Case Management Department for coordinating discharge planning if the patient needs post-hospital services based on physician/advanced practitioner order or complex needs related to functional status, cognitive ability, or social support  system  Outcome: Progressing     Problem: Knowledge Deficit  Goal: Patient/family/caregiver demonstrates understanding of disease process, treatment plan, medications, and discharge instructions  Description: Complete learning assessment and assess knowledge base.  Interventions:  - Provide teaching at level of understanding  - Provide teaching via preferred learning methods  5/20/2025 1921 by Pa Patel RN  Outcome: Progressing  5/20/2025 1450 by Pa Patel RN  Outcome: Progressing     Problem: Neurological Deficit  Goal: Neurological status is stable or improving  Description: Interventions:  - Monitor and assess patient's level of consciousness, motor function, sensory function, and level of assistance needed for ADLs.   - Monitor and report changes from baseline. Collaborate with interdisciplinary team to initiate plan and implement interventions as ordered.   - Provide and maintain a safe environment.  - Consider seizure precautions.  - Consider fall precautions.  - Consider aspiration precautions.  - Consider bleeding precautions.  Outcome: Progressing     Problem: Activity Intolerance/Impaired Mobility  Goal: Mobility/activity is maintained at optimum level for patient  Description: Interventions:  - Assess and monitor patient  barriers to mobility and need for assistive/adaptive devices.  - Assess patient's emotional response to limitations.  - Collaborate with interdisciplinary team and initiate plans and interventions as ordered.  - Encourage independent activity per ability.  - Maintain proper body alignment.  - Perform active/passive rom as tolerated/ordered.  - Plan activities to conserve energy.  - Turn patient as appropriate  Outcome: Progressing     Problem: Communication Impairment  Goal: Ability to express needs and understand communication  Description: Assess patient's communication skills and ability to understand information.  Patient will demonstrate use of effective communication  techniques, alternative methods of communication and understanding even if not able to speak.     - Encourage communication and provide alternate methods of communication as needed.  - Collaborate with case management/ for discharge needs.  - Include patient/family/caregiver in decisions related to communication.  Outcome: Progressing     Problem: Potential for Aspiration  Goal: Non-ventilated patient's risk of aspiration is minimized  Description: Assess and monitor vital signs, respiratory status, and labs (WBC).  Monitor for signs of aspiration (tachypnea, cough, rales, wheezing, cyanosis, fever).    - Assess and monitor patient's ability to swallow.  - Place patient up in chair to eat if possible.  - HOB up at 90 degrees to eat if unable to get patient up into chair.  - Supervise patient during oral intake.   - Instruct patient/ family to take small bites.  - Instruct patient/ family to take small single sips when taking liquids.  - Follow patient-specific strategies generated by speech pathologist.  Outcome: Progressing  Goal: Ventilated patient's risk of aspiration is minimized  Description: Assess and monitor vital signs, respiratory status, airway cuff pressure, and labs (WBC).  Monitor for signs of aspiration (tachypnea, cough, rales, wheezing, cyanosis, fever).    - Elevate head of bed 30 degrees if patient has tube feeding.  - Monitor tube feeding.  Outcome: Progressing     Problem: Nutrition  Goal: Nutrition/Hydration status is improving  Description: Monitor and assess patient's nutrition/hydration status for malnutrition (ex- brittle hair, bruises, dry skin, pale skin and conjunctiva, muscle wasting, smooth red tongue, and disorientation). Collaborate with interdisciplinary team and initiate plan and interventions as ordered.  Monitor patient's weight and dietary intake as ordered or per policy. Utilize nutrition screening tool and intervene per policy. Determine patient's food  preferences and provide high-protein, high-caloric foods as appropriate.     - Assist patient with eating.  - Allow adequate time for meals.  - Encourage patient to take dietary supplement as ordered.  - Collaborate with clinical nutritionist.  - Include patient/family/caregiver in decisions related to nutrition.  Outcome: Progressing

## 2025-05-20 NOTE — DISCHARGE INSTRUCTIONS
Take zofran as needed for nausea and vomiting. Take meclizine for episodes of dizziness.     Follow with primary care provider for review and continuation of care. For worsening symptoms or the development of severe headache, chest pain, SOB, abdominal pain, nausea, vomiting, diarrhea, or weakness, call 911 or return to the emergency department for further evaluation.

## 2025-05-20 NOTE — ASSESSMENT & PLAN NOTE
Per ER provider, case reviewed with neurology who recommended admission for stroke workup  CTA head/neck negative for any large vessel obstruction  Neurochecks per stroke pathway  Check MRI brain  Echo  Monitor on telemetry  PT/OT/speech eval  Aspirin/statin  Could be medication related as patient was recently started on gabapentin and tramadol.  Will hold these for now and monitor

## 2025-05-20 NOTE — Clinical Note
Case was discussed with  and the patient's admission status was agreed to be  to the service of Dr. Deras

## 2025-05-20 NOTE — H&P
H&P - Hospitalist   Name: Rebecca Erickson 34 y.o. female I MRN: [id number]  Unit/Bed#: -01 I Date of Admission: 5/20/2025   Date of Service: 5/20/2025 I Hospital Day: 0     Assessment & Plan  Dizziness  Per ER provider, case reviewed with neurology who recommended admission for stroke workup  CTA head/neck negative for any large vessel obstruction  Neurochecks per stroke pathway  Check MRI brain  Echo  Monitor on telemetry  PT/OT/speech eval  Aspirin/statin  Could be medication related as patient was recently started on gabapentin and tramadol.  Will hold these for now and monitor  Cluster headache, not intractable  Denies any headache currently.  Pain control as needed  Pain, dental  Patient scheduled for outpatient dental/OMFS evaluation  Continue clindamycin to complete course of therapy (patient was on this medication prior to admission)      VTE Pharmacologic Prophylaxis:   Moderate Risk (Score 3-4) - Pharmacological DVT Prophylaxis Ordered: enoxaparin (Lovenox).  Code Status: Level 1 - Full Code   Discussion with family: Patient declined call to .     Anticipated Length of Stay: Patient will be admitted on an inpatient basis with an anticipated length of stay of greater than 2 midnights secondary to dizziness.    History of Present Illness   Chief Complaint: Dizziness    Rebecca Erickson is a 34 y.o. female with a PMH of history of PE related to pregnancy, dental caries who presents with dizziness.  Patient states that she woke up in the middle of the night around 2 AM to go to the bathroom.  While ambulating to the bathroom she felt dizzy and unsteady which was unusual.  Patient thought that it was just because she was drowsy and went back to bed.  Patient woke up a few hours later with worsening dizziness and unable to walk to the bathroom.  States that she had to crawl to the bathroom.  Patient called EMS and brought to the ER  In the ER stroke workup initiated.  CTA head/neck negative for  any large vessel obstruction.  Neurology recommended admission for stroke workup    Of note patient has history of migraines however states that she has never had a complex migraine and has not had any recent headaches/intractable migraine.  Patient has been dealing with dental caries and has been on clindamycin.  Currently on second course of clindamycin.  Patient was also prescribed gabapentin and tramadol for pain which she has been taking over the last week.  Denies any drugs or alcohol abuse.  No recent head trauma.  Eating and drinking okay.  No fever or chills.    Review of Systems   Constitutional:  Negative for chills and fever.   Neurological:  Positive for dizziness. Negative for facial asymmetry, speech difficulty and headaches.   All other systems reviewed and are negative.      Historical Information   Past Medical History:   Diagnosis Date    Allergic     Anxiety     Depression     Irritable bowel syndrome     last assessed: 10/23/2015    Maternal obesity, antepartum     last assessed: 2017    Migraine      Past Surgical History:   Procedure Laterality Date     SECTION      FOOT SURGERY       Social History     Tobacco Use    Smoking status: Never    Smokeless tobacco: Never   Vaping Use    Vaping status: Never Used   Substance and Sexual Activity    Alcohol use: Not Currently    Drug use: Not Currently    Sexual activity: Yes     E-Cigarette/Vaping    E-Cigarette Use Never User      E-Cigarette/Vaping Substances    Nicotine No     THC No     CBD No     Flavoring No     Other No     Unknown No      Family History   Problem Relation Age of Onset    Irritable bowel syndrome Mother     Ovarian cancer Family     Ovarian cancer Family      Social History:  Marital Status: Single   Patient Pre-hospital Living Situation: Home  Patient Pre-hospital Level of Mobility: walks  Patient Pre-hospital Diet Restrictions: none    Meds/Allergies   I have reviewed home medications with patient  personally.  Prior to Admission medications    Medication Sig Start Date End Date Taking? Authorizing Provider   clindamycin (CLEOCIN) 300 MG capsule  5/16/25  Yes Historical Provider, MD   etonogestrel (NEXPLANON) subdermal implant 68 mg by Subdermal route Once every 3 years   Yes Historical Provider, MD   famotidine (PEPCID) 40 MG tablet Take 1 tablet (40 mg total) by mouth 2 (two) times a day 5/9/25  Yes TAHIR Dubose   ibuprofen (MOTRIN) 800 mg tablet Take 1 tablet (800 mg total) by mouth every 8 (eight) hours as needed for mild pain 5/5/25  Yes Rocio Gregorio PA-C   ondansetron (ZOFRAN) 4 mg tablet Take 1 tablet (4 mg total) by mouth every 6 (six) hours 4/29/25  Yes TAHIR Flores   tirzepatide (Zepbound) 15 mg/0.5 mL auto-injector Inject 0.5 mL (15 mg total) under the skin once a week 5/9/25  Yes TAHIR Flores   traMADol (Ultram) 50 mg tablet Take 1 tablet (50 mg total) by mouth every 6 (six) hours as needed for moderate pain for up to 12 doses 5/12/25  Yes Pavan Pitts MD   Neurontin 100 MG capsule Take 1 capsule (100 mg total) by mouth 3 (three) times a day for 7 days 5/12/25 5/19/25  Pavan Pitts MD     Allergies   Allergen Reactions    Penicillin G Hives and Diarrhea     hives  N/V       Objective :  Temp:  [97.6 °F (36.4 °C)-97.9 °F (36.6 °C)] 97.9 °F (36.6 °C)  HR:  [59-87] 78  BP: (111-137)/(62-78) 127/70  Resp:  [16-18] 16  SpO2:  [97 %-100 %] 99 %  O2 Device: None (Room air)    Physical Exam  Constitutional:       General: She is not in acute distress.  HENT:      Head: Normocephalic and atraumatic.      Nose: Nose normal.      Mouth/Throat:      Mouth: Mucous membranes are moist.     Eyes:      Extraocular Movements: Extraocular movements intact.      Conjunctiva/sclera: Conjunctivae normal.       Cardiovascular:      Rate and Rhythm: Normal rate and regular rhythm.   Pulmonary:      Effort: Pulmonary effort is normal. No respiratory distress.   Abdominal:       Palpations: Abdomen is soft.      Tenderness: There is no abdominal tenderness.     Musculoskeletal:         General: Normal range of motion.      Cervical back: Normal range of motion and neck supple.     Skin:     General: Skin is warm and dry.     Neurological:      General: No focal deficit present.      Mental Status: She is alert. Mental status is at baseline.      Cranial Nerves: No cranial nerve deficit.     Psychiatric:         Mood and Affect: Mood normal.         Behavior: Behavior normal.          Lines/Drains:        Lab Results: I have reviewed the following results:  Results from last 7 days   Lab Units 05/20/25  0958   WBC Thousand/uL 10.48*   HEMOGLOBIN g/dL 12.0   HEMATOCRIT % 36.9   PLATELETS Thousands/uL 283   SEGS PCT % 81*   LYMPHO PCT % 14   MONO PCT % 4   EOS PCT % 1     Results from last 7 days   Lab Units 05/20/25  0958   SODIUM mmol/L 136   POTASSIUM mmol/L 3.8   CHLORIDE mmol/L 106   CO2 mmol/L 23   BUN mg/dL 19   CREATININE mg/dL 0.65   ANION GAP mmol/L 7   CALCIUM mg/dL 8.6   GLUCOSE RANDOM mg/dL 98             Lab Results   Component Value Date    HGBA1C 5.8 (H) 09/05/2024           Imaging Results Review: I reviewed radiology reports from this admission including: CT head.  Other Study Results Review: No additional pertinent studies reviewed.    Administrative Statements       ** Please Note: This note has been constructed using a voice recognition system. **

## 2025-05-20 NOTE — PLAN OF CARE
Problem: Potential for Falls  Goal: Patient will remain free of falls  Description: INTERVENTIONS:  - Educate patient/family on patient safety including physical limitations  - Instruct patient to call for assistance with activity   - Consider consulting OT/PT to assist with strengthening/mobility based on AM PAC & JH-HLM score  - Consult OT/PT to assist with strengthening/mobility   - Keep Call bell within reach  - Keep bed low and locked with side rails adjusted as appropriate  - Keep care items and personal belongings within reach  - Initiate and maintain comfort rounds  - Make Fall Risk Sign visible to staff  - Offer Toileting every 2 Hours, in advance of need  - Initiate/Maintain n/a alarm  - Obtain necessary fall risk management equipment: nonslip socks  - Apply yellow socks and bracelet for high fall risk patients  - Consider moving patient to room near nurses station  Outcome: Progressing     Problem: PAIN - ADULT  Goal: Verbalizes/displays adequate comfort level or baseline comfort level  Description: Interventions:  - Encourage patient to monitor pain and request assistance  - Assess pain using appropriate pain scale  - Administer analgesics as ordered based on type and severity of pain and evaluate response  - Implement non-pharmacological measures as appropriate and evaluate response  - Consider cultural and social influences on pain and pain management  - Notify physician/advanced practitioner if interventions unsuccessful or patient reports new pain  - Educate patient/family on pain management process including their role and importance of  reporting pain   - Provide non-pharmacologic/complimentary pain relief interventions  Outcome: Progressing     Problem: INFECTION - ADULT  Goal: Absence or prevention of progression during hospitalization  Description: INTERVENTIONS:  - Assess and monitor for signs and symptoms of infection  - Monitor lab/diagnostic results  - Monitor all insertion sites, i.e.  indwelling lines, tubes, and drains  - Monitor endotracheal if appropriate and nasal secretions for changes in amount and color  - [place] appropriate cooling/warming therapies per order  - Administer medications as ordered  - Instruct and encourage patient and family to use good hand hygiene technique  - Identify and instruct in appropriate isolation precautions for identified infection/condition  Outcome: Progressing  Goal: Absence of fever/infection during neutropenic period  Description: INTERVENTIONS:  - Monitor WBC  - Perform strict hand hygiene  - Limit to healthy visitors only  - No plants, dried, fresh or silk flowers with hassan in patient room  Outcome: Progressing     Problem: SAFETY ADULT  Goal: Patient will remain free of falls  Description: INTERVENTIONS:  - Educate patient/family on patient safety including physical limitations  - Instruct patient to call for assistance with activity   - Consider consulting OT/PT to assist with strengthening/mobility based on AM PAC & -HLM score  - Consult OT/PT to assist with strengthening/mobility   - Keep Call bell within reach  - Keep bed low and locked with side rails adjusted as appropriate  - Keep care items and personal belongings within reach  - Initiate and maintain comfort rounds  - Make Fall Risk Sign visible to staff  - Offer Toileting every 2 Hours, in advance of need  - Initiate/Maintain n/a alarm  - Obtain necessary fall risk management equipment: nonslip socks  - Apply yellow socks and bracelet for high fall risk patients  - Consider moving patient to room near nurses station  Outcome: Progressing  Goal: Maintain or return to baseline ADL function  Description: INTERVENTIONS:  -  Assess patient's ability to carry out ADLs; assess patient's baseline for ADL function and identify physical deficits which impact ability to perform ADLs (bathing, care of mouth/teeth, toileting, grooming, dressing, etc.)  - Assess/evaluate cause of self-care deficits   -  Assess range of motion  - Assess patient's mobility; develop plan if impaired  - Assess patient's need for assistive devices and provide as appropriate  - Encourage maximum independence but intervene and supervise when necessary  - Involve family in performance of ADLs  - Assess for home care needs following discharge   - Consider OT consult to assist with ADL evaluation and planning for discharge  - Provide patient education as appropriate  - Monitor functional capacity and physical performance, use of AM PAC & JH-HLM   - Monitor gait, balance and fatigue with ambulation    Outcome: Progressing  Goal: Maintains/Returns to pre admission functional level  Description: INTERVENTIONS:  - Perform AM-PAC 6 Click Basic Mobility/ Daily Activity assessment daily.  - Set and communicate daily mobility goal to care team and patient/family/caregiver.   - Collaborate with rehabilitation services on mobility goals if consulted  - Perform Range of Motion 3 times a day.  - Reposition patient every 2 hours.  - Dangle patient 3 times a day  - Stand patient 3 times a day  - Ambulate patient 3 times a day  - Out of bed to chair 3 times a day   - Out of bed for meals 3 times a day  - Out of bed for toileting  - Record patient progress and toleration of activity level   Outcome: Progressing     Problem: DISCHARGE PLANNING  Goal: Discharge to home or other facility with appropriate resources  Description: INTERVENTIONS:  - Identify barriers to discharge w/patient and caregiver  - Arrange for needed discharge resources and transportation as appropriate  - Identify discharge learning needs (meds, wound care, etc.)  - Arrange for interpretive services to assist at discharge as needed  - Refer to Case Management Department for coordinating discharge planning if the patient needs post-hospital services based on physician/advanced practitioner order or complex needs related to functional status, cognitive ability, or social support  system  Outcome: Progressing     Problem: Knowledge Deficit  Goal: Patient/family/caregiver demonstrates understanding of disease process, treatment plan, medications, and discharge instructions  Description: Complete learning assessment and assess knowledge base.  Interventions:  - Provide teaching at level of understanding  - Provide teaching via preferred learning methods  Outcome: Progressing

## 2025-05-20 NOTE — ED ATTENDING ATTESTATION
5/20/2025  I, Oli Sosa MD, saw and evaluated the patient. I have discussed the patient with the resident/non-physician practitioner and agree with the resident's/non-physician practitioner's findings, Plan of Care, and MDM as documented in the resident's/non-physician practitioner's note, except where noted. All available labs and Radiology studies were reviewed.  I was present for key portions of any procedure(s) performed by the resident/non-physician practitioner and I was immediately available to provide assistance.       At this point I agree with the current assessment done in the Emergency Department.  I have conducted an independent evaluation of this patient a history and physical is as follows:    ED Course     34 y.o.  female presented to ED with complaint of dizziness and vomiting that started this morning.  Symptoms are positional  Patient was seen examined at bedside awake alert oriented GCS 15 no focal neurological deficit noted.  Physical exam  Awake alert oriented GCS 15  Dizziness is reproducible with the position  Cardiovascular regular rate rhythm  Lungs clear to auscultation  Abdomen soft nontender nondistended    Differential Diagnosis: Rule out CVA/TIA BPPV, labyrinthitis, viral syndrome, arrhythmia     Plan will obtain lab CBC BMP will obtain a CT head and neck  Patient is given meclizine is feeling much better however still dizzy when walking recommended to discuss with neurology  This case discussed with neurology recommending to admit for stroke pathway      Critical Care Time  Procedures

## 2025-05-20 NOTE — ED NOTES
Pt ambulated by PCA, reported that she felt off balance as if leaning to the left.     Bernarda Ferraro RN  05/20/25 2127

## 2025-05-20 NOTE — ED PROVIDER NOTES
Time reflects when diagnosis was documented in both MDM as applicable and the Disposition within this note       Time User Action Codes Description Comment    5/20/2025  9:42 AM BishopJamin mejiaon Add [R42] Dizziness     5/20/2025  9:42 AM BishopJamin mejiaon Add [R11.0] Nausea     5/20/2025  9:42 AM Bishop, John Add [R11.10] Vomiting           ED Disposition       ED Disposition   Admit    Condition   Stable    Date/Time   Tue May 20, 2025  1:29 PM    Comment   Case was discussed with Dr. Anderson and the patient's admission status was agreed to be Admission Status: inpatient status to the service of Dr. Anderson .               Assessment & Plan       Medical Decision Making   34 y.o.  female with a history  presents to ED with complaint of acute dizziness and an episode of vomiting this morning. Patient reports that symptoms worsens with head movement.  On evaluation patient is well appearing, with stable vital signs. Exam significant for nonfocal neurologic exam, absence of central of peripheral vertigo, equal strength and sensation in the upper and lower extremities, soft nontender abdomen.      Differential Diagnosis: includes but is not limited to BPPV, labyrinthitis, viral syndrome, arrhythmia, CVA, TIA    Evaluation and Management: (see ED course for additioinal details)  - Labs: cbc, bmp  - Imaging: CTA  head and neck  - EKG: Sinus rhythm, rate of 73 bpm, normal axis, no ischemic changes appreciated  - Interventions: Zofran, antivert, and 1L saline    Clinical impression is most consistent with tia/cva due to persistent dizziness    Plan and Disposition:   - Admission to inpatient tele for stroke pathway  - Reviewed diagnosis, treatment plan, and expectant course  - Verbal and written instructions given for outpatient follow up   - Discussed reasons to Return to ED.      Amount and/or Complexity of Data Reviewed  Labs: ordered.  Radiology: ordered. Decision-making details documented in ED  Course.    Risk  Prescription drug management.  Decision regarding hospitalization.    Critical Care Time Statement: Upon my evaluation, this patient had a high probability of imminent or life-threatening deterioration due to concern for TIA, CVA, pfo, arrhythmia which required my direct attention, intervention, and personal management. I spent a total of 75 minutes directly providing critical care services, including interpretation of complex medical databases, evaluating for the presence of life-threatening injuries or illnesses, management of organ system failure(s) , complex medical decision making (to support/prevent further life-threatening deterioration)., interpretation of hemodynamic data, and titration of continuous IV medications (drips). This time is exclusive of procedures, teaching, treating other patients, family meetings, and any prior time recorded by providers other than myself.       ED Course as of 05/20/25 1506   Tue May 20, 2025   1033 She reports that her nausea improved after receiving the fluids, Zofran, and meclizine   1114 Patient reassessed and reports that during her ambulatory trial even though she was standing straight she felt as though her body was tilting on a diagonal to the left.  On reassessment of her neurologic exam her cerebellar function was maintained without dysdiadochokinesia, finger-nose, and heel-to-shin.   1219 CTA head and neck with and without contrast  CT Brain: No mass effect, acute intracranial hemorrhage or evidence of recent infarction     CT Angiography: No evidence for high-grade stenosis, focal occlusion or vascular aneurysm of the cervical or intracranial vessels.     1226 Patient reassessed and reports persistent unsteadiness when ambulating as well as return of nausea   1241 Discussed patient with Dr. Dobbs from neurology who recommends admission for stroke evaluation due to persistent symptoms. Patient is aware and amenable.        Medications    ondansetron (ZOFRAN) injection 4 mg (4 mg Intravenous Given 25 0957)   sodium chloride 0.9 % bolus 1,000 mL (0 mL Intravenous Stopped 25 1211)   meclizine (ANTIVERT) tablet 25 mg (25 mg Oral Given 25 0952)   iohexol (OMNIPAQUE) 350 MG/ML injection (MULTI-DOSE) 75 mL (75 mL Intravenous Given 25 1100)       ED Risk Strat Scores                    No data recorded        SBIRT 22yo+      Flowsheet Row Most Recent Value   Initial Alcohol Screen: US AUDIT-C     1. How often do you have a drink containing alcohol? 0 Filed at: 2025   2. How many drinks containing alcohol do you have on a typical day you are drinking?  0 Filed at: 2025   3a. Male UNDER 65: How often do you have five or more drinks on one occasion? 0 Filed at: 2025   3b. FEMALE Any Age, or MALE 65+: How often do you have 4 or more drinks on one occassion? 0 Filed at: 2025   Audit-C Score 0 Filed at: 2025   VIKY: How many times in the past year have you...    Used an illegal drug or used a prescription medication for non-medical reasons? Never Filed at: 2025                            History of Present Illness       Chief Complaint   Patient presents with    Dizziness     Patient arrives with complaints of dizziness that started in the middle of the night causing her to vomit. States is currently on Clindamycin for tooth infection, which was causing left ear pain. Denies pain in that ear currently, denies headaches.        Past Medical History:   Diagnosis Date    Allergic     Anxiety     Depression     Irritable bowel syndrome     last assessed: 10/23/2015    Maternal obesity, antepartum     last assessed: 2017    Migraine       Past Surgical History:   Procedure Laterality Date     SECTION      FOOT SURGERY        Family History   Problem Relation Age of Onset    Irritable bowel syndrome Mother     Ovarian cancer Family     Ovarian cancer Family        Social History[1]   E-Cigarette/Vaping    E-Cigarette Use Never User       E-Cigarette/Vaping Substances    Nicotine No     THC No     CBD No     Flavoring No     Other No     Unknown No       I have reviewed and agree with the history as documented.     Patient is a 34-year-old female with past medical history of migraines and IBS presenting with a 1 day history of dizziness and a single episode of vomiting.  Patient reports that the dizziness occurred in the evening last night, worsened with head movement and change in position.  Upon waking up this morning and getting her children ready for school and moving around dizziness worsens describing it as room spinning.  At this time she had an episode of vomiting and proceeded to come to the emergency department for evaluation. Patient reports that she was started on clindamycin for a dental infection that caused left sided ear pain, that has since resolved.       History provided by:  Patient  Dizziness  Associated symptoms: nausea and vomiting    Associated symptoms: no chest pain, no headaches, no tinnitus and no weakness        Review of Systems   HENT:  Negative for ear pain, rhinorrhea, sinus pressure, sinus pain, sneezing, sore throat and tinnitus.    Cardiovascular:  Negative for chest pain.   Gastrointestinal:  Positive for nausea and vomiting. Negative for abdominal pain.   Neurological:  Positive for dizziness. Negative for weakness and headaches.           Objective       ED Triage Vitals   Temperature Pulse Blood Pressure Respirations SpO2 Patient Position - Orthostatic VS   05/20/25 0915 05/20/25 0915 05/20/25 0919 05/20/25 0915 05/20/25 0915 05/20/25 0919   97.6 °F (36.4 °C) 87 137/78 18 99 % Sitting      Temp Source Heart Rate Source BP Location FiO2 (%) Pain Score    05/20/25 1407 05/20/25 0915 05/20/25 0919 -- 05/20/25 0915    Oral Monitor Left arm  No Pain      Vitals      Date and Time Temp Pulse SpO2 Resp BP Pain Score FACES Pain Rating User    05/20/25 1453 -- -- -- -- -- No Pain -- TS   05/20/25 1407 -- -- -- 16 -- -- -- ES   05/20/25 1407 97.9 °F (36.6 °C) 74 97 % -- 116/66 -- -- DII   05/20/25 1045 -- 59 99 % 17 114/68 -- -- JW   05/20/25 1013 -- -- -- -- -- No Pain -- JW   05/20/25 0945 -- 83 100 % 16 111/70 -- -- JW   05/20/25 0930 -- 66 98 % -- 119/62 -- -- JW   05/20/25 0919 -- -- -- -- 137/78 -- -- TG   05/20/25 0915 97.6 °F (36.4 °C) 87 99 % 18 -- No Pain -- DK            Physical Exam  Vitals and nursing note reviewed.   Constitutional:       General: She is not in acute distress.     Appearance: She is not ill-appearing.   HENT:      Head: Normocephalic and atraumatic.      Right Ear: Tympanic membrane and external ear normal. No mastoid tenderness.      Left Ear: Tympanic membrane and external ear normal. No mastoid tenderness.      Ears:      Comments: External auditory canals are clear without excessive cerumen or foreign body present.     Mouth/Throat:      Mouth: Mucous membranes are moist.     Eyes:      Extraocular Movements: Extraocular movements intact.      Pupils: Pupils are equal, round, and reactive to light.       Cardiovascular:      Rate and Rhythm: Normal rate and regular rhythm.   Pulmonary:      Effort: Pulmonary effort is normal.   Abdominal:      General: Abdomen is flat.     Musculoskeletal:         General: No swelling or tenderness.     Skin:     General: Skin is warm and dry.     Neurological:      General: No focal deficit present.      Mental Status: Mental status is at baseline.      Comments: Pupils are equal round and reactive to light accommodation.  Strength intact of the upper and lower extremities able to lift to gravity and resisted external force.  Sensation intact and equal in the upper and lower extremities without discrepancy.   Psychiatric:         Mood and Affect: Mood normal.         Behavior: Behavior normal.         Results Reviewed       Procedure Component Value Units Date/Time    POCT pregnancy,  urine [[id number]]  (Normal) Collected: 05/20/25 1153    Lab Status: Final result Updated: 05/20/25 1153     EXT Preg Test, Ur Negative     Control Valid    Basic metabolic panel [[id number]] Collected: 05/20/25 0958    Lab Status: Final result Specimen: Blood from Arm, Right Updated: 05/20/25 1047     Sodium 136 mmol/L      Potassium 3.8 mmol/L      Chloride 106 mmol/L      CO2 23 mmol/L      ANION GAP 7 mmol/L      BUN 19 mg/dL      Creatinine 0.65 mg/dL      Glucose 98 mg/dL      Calcium 8.6 mg/dL      eGFR 117 ml/min/1.73sq m     Narrative:      National Kid[employer] guidelines for Chronic Kidney Disease (CKD):     Stage 1 with normal or high GFR (GFR > 90 mL/min/1.73 square meters)    Stage 2 Mild CKD (GFR = 60-89 mL/min/1.73 square meters)    Stage 3A Moderate CKD (GFR = 45-59 mL/min/1.73 square meters)    Stage 3B Moderate CKD (GFR = 30-44 mL/min/1.73 square meters)    Stage 4 Severe CKD (GFR = 15-29 mL/min/1.73 square meters)    Stage 5 End Stage CKD (GFR <15 mL/min/1.73 square meters)  Note: GFR calculation is accurate only with a steady state creatinine    CBC and differential [[id number]]  (Abnormal) Collected: 05/20/25 0958    Lab Status: Final result Specimen: Blood from Arm, Right Updated: 05/20/25 1013     WBC 10.48 Thousand/uL      RBC 3.98 Million/uL      Hemoglobin 12.0 g/dL      Hematocrit 36.9 %      MCV 93 fL      MCH 30.2 pg      MCHC 32.5 g/dL      RDW 11.9 %      MPV 9.8 fL      Platelets 283 Thousands/uL      Segmented % 81 %      Immature Grans % 1 %      Lymphocytes % 14 %      Monocytes % 4 %      Eosinophils Relative 1 %      Basophils Relative 0 %      Absolute Neutrophils 8.44 Thousands/µL      Absolute Immature Grans 0.05 Thousand/uL      Absolute Lymphocytes 1.44 Thousands/µL      Absolute Monocytes 0.37 Thousand/µL      Eosinophils Absolute 0.14 Thousand/µL      Basophils Absolute 0.04 Thousands/µL             CTA head and neck with and without contrast   Final  Interpretation by Kenny Davila MD ([id number])   CT Brain: No mass effect, acute intracranial hemorrhage or evidence of recent infarction      CT Angiography: No evidence for high-grade stenosis, focal occlusion or vascular aneurysm of the cervical or intracranial vessels.            Workstation performed: [id number]             Procedures    ED Medication and Procedure Management   Prior to Admission Medications   Prescriptions Last Dose Informant Patient Reported? Taking?   Neurontin 100 MG capsule   No No   Sig: Take 1 capsule (100 mg total) by mouth 3 (three) times a day for 7 days   clindamycin (CLEOCIN) 300 MG capsule 2025  Yes Yes   etonogestrel (NEXPLANON) subdermal implant 2025  Yes Yes   Si mg by Subdermal route Once every 3 years   famotidine (PEPCID) 40 MG tablet 2025  No Yes   Sig: Take 1 tablet (40 mg total) by mouth 2 (two) times a day   ibuprofen (MOTRIN) 800 mg tablet 2025  No Yes   Sig: Take 1 tablet (800 mg total) by mouth every 8 (eight) hours as needed for mild pain   ondansetron (ZOFRAN) 4 mg tablet Past Month  No Yes   Sig: Take 1 tablet (4 mg total) by mouth every 6 (six) hours   tirzepatide (Zepbound) 15 mg/0.5 mL auto-injector Past Week  No Yes   Sig: Inject 0.5 mL (15 mg total) under the skin once a week   traMADol (Ultram) 50 mg tablet 2025  No Yes   Sig: Take 1 tablet (50 mg total) by mouth every 6 (six) hours as needed for moderate pain for up to 12 doses      Facility-Administered Medications: None     Current Discharge Medication List        CONTINUE these medications which have NOT CHANGED    Details   clindamycin (CLEOCIN) 300 MG capsule       etonogestrel (NEXPLANON) subdermal implant 68 mg by Subdermal route Once every 3 years      famotidine (PEPCID) 40 MG tablet Take 1 tablet (40 mg total) by mouth 2 (two) times a day  Qty: 60 tablet, Refills: 2    Associated Diagnoses: Epigastric pain; Pain aggravated by eating or drinking      ibuprofen (MOTRIN) 800  mg tablet Take 1 tablet (800 mg total) by mouth every 8 (eight) hours as needed for mild pain  Qty: 30 tablet, Refills: 0    Associated Diagnoses: Dental infection      ondansetron (ZOFRAN) 4 mg tablet Take 1 tablet (4 mg total) by mouth every 6 (six) hours  Qty: 12 tablet, Refills: 0    Associated Diagnoses: Epigastric pain      tirzepatide (Zepbound) 15 mg/0.5 mL auto-injector Inject 0.5 mL (15 mg total) under the skin once a week  Qty: 2 mL, Refills: 0    Associated Diagnoses: BMI 39.0-39.9,adult      traMADol (Ultram) 50 mg tablet Take 1 tablet (50 mg total) by mouth every 6 (six) hours as needed for moderate pain for up to 12 doses  Qty: 12 tablet, Refills: 0    Associated Diagnoses: Pain, dental      Neurontin 100 MG capsule Take 1 capsule (100 mg total) by mouth 3 (three) times a day for 7 days  Qty: 21 capsule, Refills: 0    Associated Diagnoses: Pain, dental           No discharge procedures on file.  ED SEPSIS DOCUMENTATION   Time reflects when diagnosis was documented in both MDM as applicable and the Disposition within this note       Time User Action Codes Description Comment    5/20/2025  9:42 AM John Bishop Add [R42] Dizziness     5/20/2025  9:42 AM John Bishop Add [R11.0] Nausea     5/20/2025  9:42 AM John Bishop [R11.10] Vomiting                      [1]   Social History  Tobacco Use    Smoking status: Never    Smokeless tobacco: Never   Vaping Use    Vaping status: Never Used   Substance Use Topics    Alcohol use: No    Drug use: No        John Bishop PA-C  05/20/25 1503

## 2025-05-21 ENCOUNTER — APPOINTMENT (INPATIENT)
Dept: NON INVASIVE DIAGNOSTICS | Facility: HOSPITAL | Age: 35
DRG: 111 | End: 2025-05-21
Payer: COMMERCIAL

## 2025-05-21 VITALS
WEIGHT: 162 LBS | SYSTOLIC BLOOD PRESSURE: 126 MMHG | BODY MASS INDEX: 27.66 KG/M2 | OXYGEN SATURATION: 97 % | RESPIRATION RATE: 17 BRPM | TEMPERATURE: 98.2 F | HEIGHT: 64 IN | HEART RATE: 92 BPM | DIASTOLIC BLOOD PRESSURE: 83 MMHG

## 2025-05-21 LAB
ALBUMIN SERPL BCG-MCNC: 3.7 G/DL (ref 3.5–5)
ALP SERPL-CCNC: 32 U/L (ref 34–104)
ALT SERPL W P-5'-P-CCNC: 17 U/L (ref 7–52)
ANION GAP SERPL CALCULATED.3IONS-SCNC: 6 MMOL/L (ref 4–13)
AORTIC ROOT: 3 CM
ASCENDING AORTA: 3.1 CM
AST SERPL W P-5'-P-CCNC: 9 U/L (ref 13–39)
AV LVOT MEAN GRADIENT: 2 MMHG
AV LVOT PEAK GRADIENT: 3 MMHG
BASOPHILS # BLD AUTO: 0.05 THOUSANDS/ÂΜL (ref 0–0.1)
BASOPHILS NFR BLD AUTO: 1 % (ref 0–1)
BILIRUB SERPL-MCNC: 0.24 MG/DL (ref 0.2–1)
BSA FOR ECHO PROCEDURE: 1.79 M2
BUN SERPL-MCNC: 12 MG/DL (ref 5–25)
CALCIUM SERPL-MCNC: 8.5 MG/DL (ref 8.4–10.2)
CHLORIDE SERPL-SCNC: 111 MMOL/L (ref 96–108)
CHOLEST SERPL-MCNC: 142 MG/DL (ref ?–200)
CO2 SERPL-SCNC: 24 MMOL/L (ref 21–32)
CREAT SERPL-MCNC: 0.65 MG/DL (ref 0.6–1.3)
DOP CALC LVOT PEAK VEL VTI: 17.08 CM
DOP CALC LVOT PEAK VEL: 0.88 M/S
E WAVE DECELERATION TIME: 196 MS
E/A RATIO: 1.37
EOSINOPHIL # BLD AUTO: 0.18 THOUSAND/ÂΜL (ref 0–0.61)
EOSINOPHIL NFR BLD AUTO: 2 % (ref 0–6)
ERYTHROCYTE [DISTWIDTH] IN BLOOD BY AUTOMATED COUNT: 12.2 % (ref 11.6–15.1)
EST. AVERAGE GLUCOSE BLD GHB EST-MCNC: 108 MG/DL
FRACTIONAL SHORTENING: 34 (ref 28–44)
GFR SERPL CREATININE-BSD FRML MDRD: 117 ML/MIN/1.73SQ M
GLUCOSE SERPL-MCNC: 94 MG/DL (ref 65–140)
HBA1C MFR BLD: 5.4 %
HCT VFR BLD AUTO: 34.4 % (ref 34.8–46.1)
HDLC SERPL-MCNC: 39 MG/DL
HGB BLD-MCNC: 11.3 G/DL (ref 11.5–15.4)
IMM GRANULOCYTES # BLD AUTO: 0.03 THOUSAND/UL (ref 0–0.2)
IMM GRANULOCYTES NFR BLD AUTO: 0 % (ref 0–2)
INTERVENTRICULAR SEPTUM IN DIASTOLE (PARASTERNAL SHORT AXIS VIEW): 0.7 CM
INTERVENTRICULAR SEPTUM: 0.7 CM (ref 0.6–1.1)
LAAS-AP2: 15.2 CM2
LAAS-AP4: 17.3 CM2
LDLC SERPL CALC-MCNC: 77 MG/DL (ref 0–100)
LEFT ATRIUM SIZE: 3.3 CM
LEFT ATRIUM VOLUME (MOD BIPLANE): 48 ML
LEFT ATRIUM VOLUME INDEX (MOD BIPLANE): 26.2 ML/M2
LEFT INTERNAL DIMENSION IN SYSTOLE: 2.9 CM (ref 2.1–4)
LEFT VENTRICULAR INTERNAL DIMENSION IN DIASTOLE: 4.4 CM (ref 3.5–6)
LEFT VENTRICULAR POSTERIOR WALL IN END DIASTOLE: 0.7 CM
LEFT VENTRICULAR STROKE VOLUME: 56 ML
LV EF US.2D.A4C+ESTIMATED: 61 %
LVSV (TEICH): 56 ML
LYMPHOCYTES # BLD AUTO: 2.72 THOUSANDS/ÂΜL (ref 0.6–4.47)
LYMPHOCYTES NFR BLD AUTO: 34 % (ref 14–44)
MAGNESIUM SERPL-MCNC: 2 MG/DL (ref 1.9–2.7)
MCH RBC QN AUTO: 30.4 PG (ref 26.8–34.3)
MCHC RBC AUTO-ENTMCNC: 32.8 G/DL (ref 31.4–37.4)
MCV RBC AUTO: 93 FL (ref 82–98)
MONOCYTES # BLD AUTO: 0.41 THOUSAND/ÂΜL (ref 0.17–1.22)
MONOCYTES NFR BLD AUTO: 5 % (ref 4–12)
MV E'TISSUE VEL-SEP: 17 CM/S
MV PEAK A VEL: 0.62 M/S
MV PEAK E VEL: 85 CM/S
MV STENOSIS PRESSURE HALF TIME: 57 MS
MV VALVE AREA P 1/2 METHOD: 3.86
NEUTROPHILS # BLD AUTO: 4.6 THOUSANDS/ÂΜL (ref 1.85–7.62)
NEUTS SEG NFR BLD AUTO: 58 % (ref 43–75)
NRBC BLD AUTO-RTO: 0 /100 WBCS
PLATELET # BLD AUTO: 312 THOUSANDS/UL (ref 149–390)
PMV BLD AUTO: 10.2 FL (ref 8.9–12.7)
POTASSIUM SERPL-SCNC: 3.3 MMOL/L (ref 3.5–5.3)
PROT SERPL-MCNC: 6.1 G/DL (ref 6.4–8.4)
RBC # BLD AUTO: 3.72 MILLION/UL (ref 3.81–5.12)
RIGHT ATRIUM AREA SYSTOLE A4C: 13.4 CM2
RIGHT VENTRICLE ID DIMENSION: 2.2 CM
SL CV LEFT ATRIUM LENGTH A2C: 4.5 CM
SL CV LV EF: 60
SL CV PED ECHO LEFT VENTRICLE DIASTOLIC VOLUME (MOD BIPLANE) 2D: 89 ML
SL CV PED ECHO LEFT VENTRICLE SYSTOLIC VOLUME (MOD BIPLANE) 2D: 34 ML
SODIUM SERPL-SCNC: 141 MMOL/L (ref 135–147)
TRICUSPID ANNULAR PLANE SYSTOLIC EXCURSION: 2 CM
TRIGL SERPL-MCNC: 130 MG/DL (ref ?–150)
WBC # BLD AUTO: 7.99 THOUSAND/UL (ref 4.31–10.16)

## 2025-05-21 PROCEDURE — 83036 HEMOGLOBIN GLYCOSYLATED A1C: CPT | Performed by: INTERNAL MEDICINE

## 2025-05-21 PROCEDURE — 93306 TTE W/DOPPLER COMPLETE: CPT | Performed by: INTERNAL MEDICINE

## 2025-05-21 PROCEDURE — 85025 COMPLETE CBC W/AUTO DIFF WBC: CPT | Performed by: INTERNAL MEDICINE

## 2025-05-21 PROCEDURE — 80061 LIPID PANEL: CPT | Performed by: INTERNAL MEDICINE

## 2025-05-21 PROCEDURE — 83735 ASSAY OF MAGNESIUM: CPT | Performed by: INTERNAL MEDICINE

## 2025-05-21 PROCEDURE — 80053 COMPREHEN METABOLIC PANEL: CPT | Performed by: INTERNAL MEDICINE

## 2025-05-21 PROCEDURE — 97161 PT EVAL LOW COMPLEX 20 MIN: CPT

## 2025-05-21 PROCEDURE — 99239 HOSP IP/OBS DSCHRG MGMT >30: CPT | Performed by: INTERNAL MEDICINE

## 2025-05-21 PROCEDURE — 97165 OT EVAL LOW COMPLEX 30 MIN: CPT

## 2025-05-21 PROCEDURE — 93306 TTE W/DOPPLER COMPLETE: CPT

## 2025-05-21 RX ORDER — POTASSIUM CHLORIDE 1500 MG/1
40 TABLET, EXTENDED RELEASE ORAL ONCE
Status: COMPLETED | OUTPATIENT
Start: 2025-05-21 | End: 2025-05-21

## 2025-05-21 RX ADMIN — POTASSIUM CHLORIDE 40 MEQ: 1500 TABLET, EXTENDED RELEASE ORAL at 08:05

## 2025-05-21 RX ADMIN — ASPIRIN 81 MG: 81 TABLET, CHEWABLE ORAL at 08:05

## 2025-05-21 RX ADMIN — FAMOTIDINE 40 MG: 20 TABLET, FILM COATED ORAL at 08:05

## 2025-05-21 RX ADMIN — ENOXAPARIN SODIUM 40 MG: 40 INJECTION SUBCUTANEOUS at 08:05

## 2025-05-21 RX ADMIN — CLINDAMYCIN HYDROCHLORIDE 300 MG: 150 CAPSULE ORAL at 08:05

## 2025-05-21 RX ADMIN — CLINDAMYCIN HYDROCHLORIDE 300 MG: 150 CAPSULE ORAL at 15:07

## 2025-05-21 NOTE — OCCUPATIONAL THERAPY NOTE
"  Occupational Therapy Evaluation     Patient Name: Rebecca Erickson  Today's Date: 2025  Problem List  Principal Problem:    Dizziness  Active Problems:    Cluster headache, not intractable    Pain, dental    Past Medical History  Past Medical History:   Diagnosis Date    Allergic     Anxiety     Depression     Irritable bowel syndrome     last assessed: 10/23/2015    Maternal obesity, antepartum     last assessed: 2017    Migraine      Past Surgical History  Past Surgical History:   Procedure Laterality Date     SECTION      FOOT SURGERY           25 0942   OT Last Visit   OT Visit Date 25   Note Type   Note type Evaluation   Pain Assessment   Pain Assessment Tool 0-10   Pain Score No Pain   Restrictions/Precautions   Other Precautions   (none)   Home Living   Type of Home House   Home Layout Multi-level;Stairs to enter without rails  (4 level home with 2 CEFERINO.)   Bathroom Shower/Tub Tub/shower unit   Bathroom Toilet Standard   Prior Function   Level of [place] Independent with ADLs   Lives With Family   IADLs Independent with driving;Independent with meal prep;Independent with medication management   Falls in the last 6 months 0   Vocational Full time employment   Subjective   Subjective \"I feel way better than yesterday\"   ADL   Where Assessed Edge of bed   Eating Assistance 7  Independent   Grooming Assistance 7  Independent   UB Bathing Assistance 7  Independent   LB Bathing Assistance 7  Independent   UB Dressing Assistance 7  Independent   LB Dressing Assistance 7  Independent   Toileting Assistance  7  Independent   Bed Mobility   Supine to Sit 7  Independent   Sit to Supine 7  Independent   Transfers   Sit to Stand 7  Independent   Additional items   (No AD)   Stand to Sit 7  Independent   Additional items   (No AD)   Stand pivot 7  Independent   Additional items   (No AD)   Functional Mobility   Functional Mobility 7  Independent   Additional Comments No AD   Balance "   Static Sitting Normal   Dynamic Sitting Normal   Static Standing Normal   Dynamic Standing Normal   Ambulatory Normal   Activity Tolerance   Activity Tolerance Patient tolerated treatment well   RUE Assessment   RUE Assessment WNL   LUE Assessment   LUE Assessment WNL   Hand Function   Gross Motor Coordination Functional   Fine Motor Coordination Functional   Sensation   Light Touch No apparent deficits   Vision-Basic Assessment   Current Vision No visual deficits   Cognition   Overall Cognitive Status WFL   Arousal/Participation Alert;Responsive;Cooperative   Attention Within functional limits   Orientation Level Oriented X4   Memory Within functional limits   Following Commands Follows all commands and directions without difficulty   Assessment   Prognosis Good   Assessment Pt is a 34 y.o. female seen for OT evaluation s/p admit to [facility] on 5/20/2025 w/ Dizziness.  Comorbidities affecting pt's functional performance at time of assessment include: dental pain and cluster headache. OT order placed to assess Pt's ADLs, cognitive status, and performance during functional tasks in order to maximize safety and independence while making most appropriate d/c recommendations. Prior to admission, pt was I in all adls, iadls, and functional mobility/transfers without AD. Upon evaluation: Pt presents with no functional deficits impacting pt ability to perform adls, mobility, or transfers with I. Pt's clinical presentation is currently stable . OT recommends d/c home with referral for outpatient vestibular therapy services. OT eval only completed; tx not warranted at this time.   Plan   OT Frequency Eval only   Discharge Recommendation   Rehab Resource Intensity Level, OT No post-acute rehabilitation needs   Additional Comments  The patient's raw score on the -PAC Daily Activity inpatient short form is 24, standardized score is 57.54, greater than 39.4. Patients at this level are likely to benefit from discharge to  home. Please refer to the recommendation of the Occupational Therapist for safe discharge planning.   Additional Comments 2 Pt seen as a co-eval with PT due to the patient's co-morbidities, clinically unstable presentation, and present impairments which are a regression from the patient's baseline.   AM-PAC Daily Activity Inpatient   Lower Body Dressing 4   Bathing 4   Toileting 4   Upper Body Dressing 4   Grooming 4   Eating 4   Daily Activity Raw Score 24   Daily Activity Standardized Score (Calc for Raw Score >=11) 57.54   AM-PAC Applied Cognition Inpatient   Following a Speech/Presentation 4   Understanding Ordinary Conversation 4   Taking Medications 4   Remembering Where Things Are Placed or Put Away 4   Remembering List of 4-5 Errands 4   Taking Care of Complicated Tasks 4   Applied Cognition Raw Score 24   Applied Cognition Standardized Score 62.21     Pt needs met and within reach. +call see.  Chloe Saleh OTR/L

## 2025-05-21 NOTE — SPEECH THERAPY NOTE
Speech Language/Pathology  Consult received.  Records reviewed.  Pt admitted c symptoms concerning for CVA/TIA.  Pt passed RN Dysphagia Assessment.  Communication deficits denied. Denied difficulties with swallowing. Nursing reported no overt difficulties.  MRI No evidence of infarct, hemorrhage or mass.   No additional inpatient Speech Pathology evaluation appears indicated at this time.  Please re-consult if additional concerns arise. Thank you.

## 2025-05-21 NOTE — PLAN OF CARE
Problem: Potential for Falls  Goal: Patient will remain free of falls  Description: INTERVENTIONS:  - Educate patient/family on patient safety including physical limitations  - Instruct patient to call for assistance with activity   - Consider consulting OT/PT to assist with strengthening/mobility based on AM PAC & JH-HLM score  - Consult OT/PT to assist with strengthening/mobility   - Keep Call bell within reach  - Keep bed low and locked with side rails adjusted as appropriate  - Keep care items and personal belongings within reach  - Initiate and maintain comfort rounds  - Make Fall Risk Sign visible to staff  - Offer Toileting every 2 Hours, in advance of need  - Initiate/Maintain n/a alarm  - Obtain necessary fall risk management equipment: nonslip socks   - Apply yellow socks and bracelet for high fall risk patients  - Consider moving patient to room near nurses station  Outcome: Progressing     Problem: PAIN - ADULT  Goal: Verbalizes/displays adequate comfort level or baseline comfort level  Description: Interventions:  - Encourage patient to monitor pain and request assistance  - Assess pain using appropriate pain scale  - Administer analgesics as ordered based on type and severity of pain and evaluate response  - Implement non-pharmacological measures as appropriate and evaluate response  - Consider cultural and social influences on pain and pain management  - Notify physician/advanced practitioner if interventions unsuccessful or patient reports new pain  - Educate patient/family on pain management process including their role and importance of  reporting pain   - Provide non-pharmacologic/complimentary pain relief interventions  Outcome: Progressing     Problem: INFECTION - ADULT  Goal: Absence or prevention of progression during hospitalization  Description: INTERVENTIONS:  - Assess and monitor for signs and symptoms of infection  - Monitor lab/diagnostic results  - Monitor all insertion sites, i.e.  Faxed request to [facility] again to see if pt has Endoscopy report there   indwelling lines, tubes, and drains  - Monitor endotracheal if appropriate and nasal secretions for changes in amount and color  - [place] appropriate cooling/warming therapies per order  - Administer medications as ordered  - Instruct and encourage patient and family to use good hand hygiene technique  - Identify and instruct in appropriate isolation precautions for identified infection/condition  Outcome: Progressing  Goal: Absence of fever/infection during neutropenic period  Description: INTERVENTIONS:  - Monitor WBC  - Perform strict hand hygiene  - Limit to healthy visitors only  - No plants, dried, fresh or silk flowers with hassan in patient room  Outcome: Progressing     Problem: SAFETY ADULT  Goal: Patient will remain free of falls  Description: INTERVENTIONS:  - Educate patient/family on patient safety including physical limitations  - Instruct patient to call for assistance with activity   - Consider consulting OT/PT to assist with strengthening/mobility based on AM PAC & -HLM score  - Consult OT/PT to assist with strengthening/mobility   - Keep Call bell within reach  - Keep bed low and locked with side rails adjusted as appropriate  - Keep care items and personal belongings within reach  - Initiate and maintain comfort rounds  - Make Fall Risk Sign visible to staff  - Offer Toileting every 2 Hours, in advance of need  - Initiate/Maintain n/a alarm  - Obtain necessary fall risk management equipment: nonslip socks  - Apply yellow socks and bracelet for high fall risk patients  - Consider moving patient to room near nurses station  Outcome: Progressing  Goal: Maintain or return to baseline ADL function  Description: INTERVENTIONS:  -  Assess patient's ability to carry out ADLs; assess patient's baseline for ADL function and identify physical deficits which impact ability to perform ADLs (bathing, care of mouth/teeth, toileting, grooming, dressing, etc.)  - Assess/evaluate cause of self-care deficits   -  Assess range of motion  - Assess patient's mobility; develop plan if impaired  - Assess patient's need for assistive devices and provide as appropriate  - Encourage maximum independence but intervene and supervise when necessary  - Involve family in performance of ADLs  - Assess for home care needs following discharge   - Consider OT consult to assist with ADL evaluation and planning for discharge  - Provide patient education as appropriate  - Monitor functional capacity and physical performance, use of AM PAC & JH-HLM   - Monitor gait, balance and fatigue with ambulation    Outcome: Progressing  Goal: Maintains/Returns to pre admission functional level  Description: INTERVENTIONS:  - Perform AM-PAC 6 Click Basic Mobility/ Daily Activity assessment daily.  - Set and communicate daily mobility goal to care team and patient/family/caregiver.   - Collaborate with rehabilitation services on mobility goals if consulted  - Perform Range of Motion 3 times a day.  - Reposition patient every 2 hours.  - Dangle patient 3 times a day  - Stand patient 3 times a day  - Ambulate patient 3 times a day  - Out of bed to chair 3 times a day   - Out of bed for meals 3 times a day  - Out of bed for toileting  - Record patient progress and toleration of activity level   Outcome: Progressing     Problem: DISCHARGE PLANNING  Goal: Discharge to home or other facility with appropriate resources  Description: INTERVENTIONS:  - Identify barriers to discharge w/patient and caregiver  - Arrange for needed discharge resources and transportation as appropriate  - Identify discharge learning needs (meds, wound care, etc.)  - Arrange for interpretive services to assist at discharge as needed  - Refer to Case Management Department for coordinating discharge planning if the patient needs post-hospital services based on physician/advanced practitioner order or complex needs related to functional status, cognitive ability, or social support  system  Outcome: Progressing     Problem: Knowledge Deficit  Goal: Patient/family/caregiver demonstrates understanding of disease process, treatment plan, medications, and discharge instructions  Description: Complete learning assessment and assess knowledge base.  Interventions:  - Provide teaching at level of understanding  - Provide teaching via preferred learning methods  5/21/2025 0745 by Pa Patel RN  Outcome: Progressing  5/20/2025 1921 by Pa Patel RN  Outcome: Progressing     Problem: Neurological Deficit  Goal: Neurological status is stable or improving  Description: Interventions:  - Monitor and assess patient's level of consciousness, motor function, sensory function, and level of assistance needed for ADLs.   - Monitor and report changes from baseline. Collaborate with interdisciplinary team to initiate plan and implement interventions as ordered.   - Provide and maintain a safe environment.  - Consider seizure precautions.  - Consider fall precautions.  - Consider aspiration precautions.  - Consider bleeding precautions.  5/21/2025 0745 by Pa Patel RN  Outcome: Progressing  5/20/2025 1921 by Pa Patel RN  Outcome: Progressing     Problem: Activity Intolerance/Impaired Mobility  Goal: Mobility/activity is maintained at optimum level for patient  Description: Interventions:  - Assess and monitor patient  barriers to mobility and need for assistive/adaptive devices.  - Assess patient's emotional response to limitations.  - Collaborate with interdisciplinary team and initiate plans and interventions as ordered.  - Encourage independent activity per ability.  - Maintain proper body alignment.  - Perform active/passive rom as tolerated/ordered.  - Plan activities to conserve energy.  - Turn patient as appropriate  5/21/2025 0745 by Pa Patel RN  Outcome: Progressing  5/20/2025 1921 by Pa Patel RN  Outcome: Progressing     Problem: Communication Impairment  Goal: Ability  to express needs and understand communication  Description: Assess patient's communication skills and ability to understand information.  Patient will demonstrate use of effective communication techniques, alternative methods of communication and understanding even if not able to speak.     - Encourage communication and provide alternate methods of communication as needed.  - Collaborate with case management/ for discharge needs.  - Include patient/family/caregiver in decisions related to communication.  5/21/2025 0745 by Pa Patel RN  Outcome: Progressing  5/20/2025 1921 by Pa Patel RN  Outcome: Progressing     Problem: Potential for Aspiration  Goal: Non-ventilated patient's risk of aspiration is minimized  Description: Assess and monitor vital signs, respiratory status, and labs (WBC).  Monitor for signs of aspiration (tachypnea, cough, rales, wheezing, cyanosis, fever).    - Assess and monitor patient's ability to swallow.  - Place patient up in chair to eat if possible.  - HOB up at 90 degrees to eat if unable to get patient up into chair.  - Supervise patient during oral intake.   - Instruct patient/ family to take small bites.  - Instruct patient/ family to take small single sips when taking liquids.  - Follow patient-specific strategies generated by speech pathologist.  5/21/2025 0745 by Pa Patel RN  Outcome: Progressing  5/20/2025 1921 by Pa Patel RN  Outcome: Progressing  Goal: Ventilated patient's risk of aspiration is minimized  Description: Assess and monitor vital signs, respiratory status, airway cuff pressure, and labs (WBC).  Monitor for signs of aspiration (tachypnea, cough, rales, wheezing, cyanosis, fever).    - Elevate head of bed 30 degrees if patient has tube feeding.  - Monitor tube feeding.  5/21/2025 0745 by Pa Patel RN  Outcome: Progressing  5/20/2025 1921 by Pa Patel RN  Outcome: Progressing     Problem: Nutrition  Goal:  Nutrition/Hydration status is improving  Description: Monitor and assess patient's nutrition/hydration status for malnutrition (ex- brittle hair, bruises, dry skin, pale skin and conjunctiva, muscle wasting, smooth red tongue, and disorientation). Collaborate with interdisciplinary team and initiate plan and interventions as ordered.  Monitor patient's weight and dietary intake as ordered or per policy. Utilize nutrition screening tool and intervene per policy. Determine patient's food preferences and provide high-protein, high-caloric foods as appropriate.     - Assist patient with eating.  - Allow adequate time for meals.  - Encourage patient to take dietary supplement as ordered.  - Collaborate with clinical nutritionist.  - Include patient/family/caregiver in decisions related to nutrition.  5/21/2025 0745 by Pa Patel RN  Outcome: Progressing  5/20/2025 1921 by Pa Patel RN  Outcome: Progressing

## 2025-05-21 NOTE — PHYSICAL THERAPY NOTE
"   PHYSICAL THERAPY EVALUATION  NAME:  Rebecca Erickson  DATE: 25    AGE:   34 y.o.  Mrn:   [id number]  ADMIT DX:  Dizziness [R42]  Nausea [R11.0]  Vomiting [R11.10]  Problem List: Problem List[1]    Past Medical History  Past Medical History:   Diagnosis Date    Allergic     Anxiety     Depression     Irritable bowel syndrome     last assessed: 10/23/2015    Maternal obesity, antepartum     last assessed: 2017    Migraine        Past Surgical History  Past Surgical History:   Procedure Laterality Date     SECTION      FOOT SURGERY         Length Of Stay: 1  Performed at least 2 patient identifiers during session: Name and Epic photo       25 0952   PT Last Visit   PT Visit Date 25   Note Type   Note type Evaluation   Pain Assessment   Pain Assessment Tool 0-10   Pain Score No Pain   Restrictions/Precautions   Other Precautions   (none)   Home Living   Type of Home Apartment   Home Layout Multi-level;Stairs to enter without rails  (2 moise)   Bathroom Shower/Tub Tub/shower unit   Bathroom Toilet Standard   Bathroom Accessibility Accessible   Home Equipment   (none reported)   Prior Function   Level of [place] Independent with ADLs   Lives With Family   Receives Help From Family   IADLs Independent with driving;Independent with meal prep;Independent with medication management   Falls in the last 6 months 0   Vocational Full time employment   Cognition   Overall Cognitive Status WFL   Arousal/Participation Alert   Attention Within functional limits   Orientation Level Oriented X4   Memory Within functional limits   Following Commands Follows all commands and directions without difficulty   Subjective   Subjective \"I feel much better\"   RLE Assessment   RLE Assessment WFL   LLE Assessment   LLE Assessment WFL   Vision-Basic Assessment   Current Vision No visual deficits   Coordination   Sensation WFL   Bed Mobility   Supine to Sit 7  Independent   Sit to Supine 7  Independent   Transfers "   Sit to Stand 7  Independent   Stand to Sit 7  Independent   Additional Comments No AD used   Ambulation/Elevation   Gait pattern WNL   Gait Assistance 7  Independent   Additional items Assist x 1   Assistive Device None   Distance 75 ft   Balance   Static Sitting Normal   Dynamic Sitting Normal   Static Standing Normal   Dynamic Standing Normal   Ambulatory Good   Activity Tolerance   Activity Tolerance Patient tolerated treatment well   Assessment   Prognosis Excellent   Problem List Impaired balance   Assessment Pt is 34 y.o. female seen for PT evaluation s/p admit to [facility] on [facility]/20/2025 w/ Dizziness. PT consulted to assess pt's functional mobility and d/c needs. Order placed for PT eval and tx, w/ out of bed to chair order. Pt agreeable to PT  session upon arrival, pt found supine in bed.  Patient was independent w/ all functional mobility w/ no AD.  Pt presents at [facility] with transfers, ambulation, and bed mobility.  From PT/mobility standpoint, recommendation at time of d/c would be anticipate no needs/resources. Upon conclusion pt supine in bed. D/c PT services at this time. Complexity: Comorbidities affecting pt's physical performance at time of assessment include:  dizziness, nausea . Personal factors affecting pt at time of IE include: lives in multistory home and steps to enter home. Please find objective findings from PT assessment regarding body systems outlined above with impairments and limitations including impaired balance.  Pt's clinical presentation is currently evolving seen in pt's presentation of abnormal H&H and abnormal potassium levels. The patient's AM-PAC Basic Mobility Inpatient Short Form Raw Score is 24.  A Raw score of greater than 16 suggests the patient may benefit from discharge to home. Please also refer to the recommendation of the Physical Therapist for safe discharge planning. RN verbalized pt appropriate for PT session.   Goals   Patient Goals to go home   PT  Treatment Day 0   Discharge Recommendation   Rehab Resource Intensity Level, PT No post-acute rehabilitation needs   AM-PAC Basic Mobility Inpatient   Turning in Flat Bed Without Bedrails 4   Lying on Back to Sitting on Edge of Flat Bed Without Bedrails 4   Moving Bed to Chair 4   Standing Up From Chair Using Arms 4   Walk in Room 4   Climb 3-5 Stairs With Railing 4   Basic Mobility Inpatient Raw Score 24   Basic Mobility Standardized Score 57.68   [facility] Highest Level Of Mobility   JH-HLM Goal 8: Walk 250 feet or more   JH-HLM Achieved 7: Walk 25 feet or more     Pt seen as a co-eval with OT due to the patient's co-morbidities, clinically unstable presentation, and present impairments which are a regression from the patient's baseline.       Time In: 0942  Time Out: 0952  Total Evaluation Minutes: 10    Lex Ko, PT         [1]   Patient Active Problem List  Diagnosis    Anxiety    Current mild episode of major depressive disorder without prior episode (HCC)    Irritable bowel    Anxiety and depression    Sweating abnormality    Menorrhagia    Hyperhidrosis    Cluster headache, not intractable    Migraine with status migrainosus, not intractable    Endometrial polyp    Pulmonary embolism complicating pregnancy    Compound heterozygous MTHFR mutation [id number]    History of pulmonary embolism    Postpartum depression    Pain aggravated by eating or drinking    BMI 39.0-39.9,adult    Stress at work    Tachycardia    Dermoid cyst of right ovary    Long term current use of anticoagulant    Neuralgia of left pudendal nerve    Family history of colon cancer in father    Bacterial vaginosis    Gastroenteritis    Dizziness    Pain, dental

## 2025-05-21 NOTE — NURSING NOTE
Patient received at 0300. Patient alert and awake, disconnected from scd's to use bathroom. Denies pain, offers no complaints at this time. Call bell and belongings within reach.

## 2025-05-21 NOTE — ASSESSMENT & PLAN NOTE
Patient scheduled for outpatient dental/OMFS evaluation tomorrow  Continue clindamycin to complete course of therapy (patient was on this medication prior to admission)

## 2025-05-21 NOTE — UTILIZATION REVIEW
NOTIFICATION OF INPATIENT ADMISSION   AUTHORIZATION REQUEST   SERVICING FACILITY:   [place] [address]  Tax ID: [id number]  NPI: [id number]   ATTENDING PROVIDER:  Attending Name and NPI#: Sunita Anderson Md [[id number]]  Address: [address] [place] [address]  Phone: [phone]     ADMISSION INFORMATION:  Place of Service: Inpatient [facility]  Place of Service Code: 21  Inpatient Admission Date/Time: 5/20/25  1:30 PM  Discharge Date/Time: No discharge date for patient encounter.  Admitting Diagnosis Code/Description:  Dizziness [R42]  Nausea [R11.0]  Vomiting [R11.10]     UTILIZATION REVIEW CONTACT:  Radha Rios Utilization   Network Utilization Review Department  Phone: [phone]  Fax: [phone]  Email: [email or web address]  Contact for approvals/pending authorizations, clinical reviews, and discharge.     PHYSICIAN ADVISORY SERVICES:  Medical Necessity Denial & Zckd-yu-Rumm Review  Phone: [phone]  Fax: [phone]  Email: [email or web address]     DISCHARGE SUPPORT TEAM:  For Patients Discharge Needs & Updates  Phone: [phone] opt. 2 Fax: [phone]  Email: [email or web address]

## 2025-05-21 NOTE — DISCHARGE SUMMARY
Discharge Summary - Hospitalist   Name: Rebecca Erickson 34 y.o. female I MRN: [id number]  Unit/Bed#: -01 I Date of Admission: 5/20/2025   Date of Service: 5/21/2025 I Hospital Day: 1     Assessment & Plan  Dizziness  Symptoms seem to have improved   MRI brain negative for acute stroke  Echocardiogram within normal limits  No telemetry events reported  Possibly medication related secondary to gabapentin/tramadol.  Advised to hold off on these medications.  Can utilize Tylenol as needed for pain   Ambulatory referral to vestibular rehab and neurology  Advised to return with any worsening symptoms  Cluster headache, not intractable  Denies any headache currently.  Follow-up with PCP as outpatient  Pain, dental  Patient scheduled for outpatient dental/OMFS evaluation tomorrow  Continue clindamycin to complete course of therapy (patient was on this medication prior to admission)     Medical Problems       Resolved Problems  Date Reviewed: 5/9/2025   None       Discharging Physician / Practitioner: Sunita Anderson MD  PCP: TAHIR Flores  Admission Date:   Admission Orders (From admission, onward)       Ordered        05/20/25 1330  INPATIENT ADMISSION  Once                          Discharge Date: 05/21/25    Next Steps for Physician/AP Assuming Care:  Vestibular rehab referral as needed    Test Results Pending at Discharge (will require follow up):  None    Medication Changes for Discharge & Rationale:   Advised to hold off on tramadol/gabapentin  See after visit summary for reconciled discharge medications provided to patient and/or family.     Consultations During Hospital Stay:  Neurology    Procedures Performed:   None    Significant Findings / Test Results:   Dizziness    Incidental Findings:   None    Hospital Course:   Rebecca Erickson is a 34 y.o. female patient who originally presented to the hospital on 5/20/2025 due to dizziness.  Patient admitted under stroke pathway.  Neurology  "consulted.  MRI brain negative for acute stroke.  Echocardiogram within normal meds.  Symptoms have resolved this morning.  Possibly secondary to gabapentin/tramadol which patient was taking prior to admission.  Advised to continue to hold these medications on discharge.  Ambulatory referral to vestibular rehab and neurology.    Please see above list of diagnoses and related plan for additional information.     Discharge Day Visit / Exam:   Subjective: No complaints at this time  Vitals: Blood Pressure: 104/70 (05/21/25 1055)  Pulse: 92 (05/21/25 1055)  Temperature: 97.7 °F (36.5 °C) (05/21/25 1055)  Temp Source: Oral (05/21/25 0301)  Respirations: 18 (05/21/25 1055)  Height: 5' 4\" (162.6 cm) (05/21/25 0730)  Weight - Scale: 73.5 kg (162 lb) (05/21/25 0730)  SpO2: 97 % (05/21/25 1055)    Physical Exam  Constitutional:       General: She is not in acute distress.  HENT:      Head: Normocephalic and atraumatic.      Nose: Nose normal.      Mouth/Throat:      Mouth: Mucous membranes are moist.     Eyes:      Extraocular Movements: Extraocular movements intact.      Conjunctiva/sclera: Conjunctivae normal.       Cardiovascular:      Rate and Rhythm: Normal rate and regular rhythm.   Pulmonary:      Effort: Pulmonary effort is normal. No respiratory distress.   Abdominal:      Palpations: Abdomen is soft.      Tenderness: There is no abdominal tenderness.     Musculoskeletal:         General: Normal range of motion.      Cervical back: Normal range of motion and neck supple.     Skin:     General: Skin is warm and dry.     Neurological:      General: No focal deficit present.      Mental Status: She is alert. Mental status is at baseline.      Cranial Nerves: No cranial nerve deficit.     Psychiatric:         Mood and Affect: Mood normal.         Behavior: Behavior normal.          Discussion with Family: Updated patient regarding discharge plan.     Discharge instructions/Information to patient and family:   See after " visit summary for information provided to patient and family.      Provisions for Follow-Up Care:  See after visit summary for information related to follow-up care and any pertinent home health orders.      Mobility at time of Discharge:   Basic Mobility Inpatient Raw Score: 24  JH-HLM Goal: 8: Walk 250 feet or more  JH-HLM Achieved: 7: Walk 25 feet or more  HLM Goal achieved. Continue to encourage appropriate mobility.     Disposition:   Home    Planned Readmission: no    Administrative Statements   Discharge Statement:  I have spent a total time of 35 minutes in caring for this patient on the day of the visit/encounter. >30 minutes of time was spent on: Counseling / Coordination of care, Documenting in the medical record, Reviewing / ordering tests, medicine, procedures  , and Communicating with other healthcare professionals .    **Please Note: This note may have been constructed using a voice recognition system**

## 2025-05-21 NOTE — CASE MANAGEMENT
Case Management Progress Note    Patient name Rebecca Erickson  Location /-01 MRN [id number]  : 1990 Date 2025       LOS (days): 1  Geometric Mean LOS (GMLOS) (days):   Days to GMLOS:        OBJECTIVE:        Current admission status: Inpatient  Preferred Pharmacy:   [facility] - [place], [place] - [address]  [address] [facility] [address]  Phone: [phone] Fax: [phone]    [id number] [place] - [address]  [address] [address] [facility] [address]  Phone: [phone] Fax: [phone]    Primary Care Provider: TAHIR Flores    Primary Insurance: [employer]  Secondary Insurance:     PROGRESS NOTE:  Pt is a 34yr old admitted to the hospital with dizzines for stroke work up.  Pt is IPA and works.  Pt has an AMPAC of 20.  Pt noted to walk to the BR unassisted.  Reviewed the chart for CM purposes.  Do not anticipate any care needs upon DC.  Pt has a neurology consult pending.

## 2025-05-21 NOTE — ASSESSMENT & PLAN NOTE
Symptoms seem to have improved   MRI brain negative for acute stroke  Echocardiogram within normal limits  No telemetry events reported  Possibly medication related secondary to gabapentin/tramadol.  Advised to hold off on these medications.  Can utilize Tylenol as needed for pain   Ambulatory referral to vestibular rehab and neurology  Advised to return with any worsening symptoms

## 2025-05-21 NOTE — UTILIZATION REVIEW
Initial Clinical Review    Admission: Date/Time/Statement:   Admission Orders (From admission, onward)       Ordered        05/20/25 1330  INPATIENT ADMISSION  Once                          Orders Placed This Encounter   Procedures    INPATIENT ADMISSION     Standing Status:   Standing     Number of Occurrences:   1     Level of Care:   Med Surg [16]     Estimated length of stay:   More than 2 Midnights     Certification:   I certify that inpatient services are medically necessary for this patient for a duration of greater than two midnights. See H&P and MD Progress Notes for additional information about the patient's course of treatment.     ED Arrival Information       Expected   -    Arrival   5/20/2025 09:05    Acuity   Urgent              Means of arrival   Walk-In    Escorted by   Self    Service   Hospitalist    Admission type   Emergency              Arrival complaint   dizzy, vomiting             Chief Complaint   Patient presents with    Dizziness     Patient arrives with complaints of dizziness that started in the middle of the night causing her to vomit. States is currently on Clindamycin for tooth infection, which was causing left ear pain. Denies pain in that ear currently, denies headaches.        Initial Presentation: 34 y.o. female who presented by EMS to [facility] ED.  Pertinent PMHx: IBS, migraine, psychiatric disorder. Presented w/ dizziness and unsteady gait. Went back to bed, then had worsening dizziness and unable to walk to bathroom. EXAM: dizziness. Admitted as Inpatient for evaluation and treatment of rule out stroke. PLAN: Baseline NIH stroke scale on admission, reassess Q24H x 2 D; Nursing dysphagia assessment prior to staring diet; Neuro checks: q1h x4h, q2h x8h, q4h x72h. Vitals: q1h x4h, q2h x8h, q4h x72h; MRI brain, echo, telemetry, PT/OT/ST, ASA, statin, hold gabapentin and tramadol, continue PO clindamycin (started as OP for dental infection). Neurology consulted.      Neurology: CTA head/neck negative for any large vessel obstruction. Workup for stroke.     Date: 05/21/25   Day 2: MRI brain negative for acute stroke. Echo WNL. Symptoms resolved today. May be s/t gabapentin/tramadol pt was taking s/t admit. Discontinue these meds on d/c. Ambulatory referral for vestibular rehab and Neurology.     ED Treatment-Medication Administration from 05/20/2025 0905 to 05/20/2025 1358         Date/Time Order Dose Route Action     05/20/2025 0957 ondansetron (ZOFRAN) injection 4 mg 4 mg Intravenous Given     05/20/2025 0957 sodium chloride 0.9 % bolus 1,000 mL 1,000 mL Intravenous New Bag     05/20/2025 0952 meclizine (ANTIVERT) tablet 25 mg 25 mg Oral Given     05/20/2025 1100 iohexol (OMNIPAQUE) 350 MG/ML injection (MULTI-DOSE) 75 mL 75 mL Intravenous Given            Scheduled Medications:  aspirin, 81 mg, Oral, Daily  atorvastatin, 40 mg, Oral, QPM  clindamycin, 300 mg, Oral, TID  enoxaparin, 40 mg, Subcutaneous, Daily  famotidine, 40 mg, Oral, BID    Continuous IV Infusions: none    PRN Meds:  acetaminophen, 650 mg, Oral, Q6H PRN  ondansetron, 4 mg, Intravenous, Q6H PRN      multi-electrolyte (Plasmalyte-A/Isolyte-S PH 7.4/Normosol-R) IV solution  Dose: 100 mL/hr  Freq: Once Route: IV  Indications of Use: IV Hydration  Last Dose: Stopped (05/21/25 0436)  Start: 05/20/25 1545 End: 05/21/25 0436  potassium chloride (Klor-Con M20) CR tablet 40 mEq  Dose: 40 mEq  Freq: Once Route: PO  Start: 05/21/25 0745 End: 05/21/25 0805      ED Triage Vitals   Temperature Pulse Respirations Blood Pressure SpO2 Pain Score   05/20/25 0915 05/20/25 0915 05/20/25 0915 05/20/25 0919 05/20/25 0915 05/20/25 0915   97.6 °F (36.4 °C) 87 18 137/78 99 % No Pain     Weight (last 2 days)       Date/Time Weight    05/21/25 0730 73.5 (162)    05/20/25 14:07:40 73.6 (162.26)            Vital Signs (last 3 days)       Date/Time Temp Pulse Resp BP MAP (mmHg) SpO2 O2 Device Patient Position - Orthostatic VS Nella  Coma Scale Score Pain    05/21/25 10:55:58 97.7 °F (36.5 °C) 92 18 104/70 81 97 % -- -- -- --    05/21/25 0952 -- -- -- -- -- -- -- -- -- No Pain    05/21/25 0942 -- -- -- -- -- -- -- -- -- No Pain    05/21/25 0917 -- -- -- -- -- -- None (Room air) -- 15 No Pain    05/21/25 07:55:04 98.1 °F (36.7 °C) 89 18 109/65 80 97 % -- -- -- --    05/21/25 0730 -- 64 -- 105/67 -- -- -- -- -- --    05/21/25 0301 97.7 °F (36.5 °C) 84 17 105/67 80 99 % -- -- -- --    05/21/25 0025 98.1 °F (36.7 °C) 100 17 96/51 65 -- -- -- 15 --    05/20/25 22:25:40 97.9 °F (36.6 °C) 82 18 105/61 76 100 % -- -- 15 --    05/20/25 2200 97.9 °F (36.6 °C) 82 18 105/61 -- 100 % -- -- -- --    05/20/25 2025 98 °F (36.7 °C) 73 18 105/59 74 97 % None (Room air) -- 15 No Pain    05/20/25 20:23:24 -- 73 18 105/59 74 97 % -- -- -- --    05/20/25 2000 98 °F (36.7 °C) 73 18 105/59 -- 99 % -- -- -- --    05/20/25 1932 98.1 °F (36.7 °C) 71 17 104/60 -- 99 % -- -- -- --    05/20/25 1930 -- 71 -- 104/60 75 98 % -- -- -- --    05/20/25 1825 97.8 °F (36.6 °C) 101 18 99/54 102 98 % None (Room air) Lying 15 --    05/20/25 1725 98 °F (36.7 °C) 73 16 -- -- 99 % None (Room air) Lying 15 --    05/20/25 1720 98 °F (36.7 °C) -- 16 105/67 80 -- -- Lying -- --    05/20/25 16:33:01 97.9 °F (36.6 °C) 67 16 105/64 78 99 % -- -- -- --    05/20/25 1625 97.9 °F (36.6 °C) -- 16 105/64 78 -- -- Lying 15 --    05/20/25 1536 97.7 °F (36.5 °C) 78 16 127/70 -- 99 % -- -- -- --    05/20/25 15:25:40 97.7 °F (36.5 °C) 78 16 127/70 89 99 % -- Lying -- --    05/20/25 1525 -- -- -- -- -- -- -- -- 15 --    05/20/25 1453 -- -- -- -- -- -- None (Room air) -- 15 No Pain    05/20/25 14:07:40 97.9 °F (36.6 °C) 74 16 116/66 83 97 % None (Room air) Lying -- --    05/20/25 1045 -- 59 17 114/68 -- 99 % -- -- -- --    05/20/25 1013 -- -- -- -- -- -- -- -- 15 No Pain    05/20/25 0945 -- 83 16 111/70 85 100 % -- -- -- --    05/20/25 0930 -- 66 -- 119/62 83 98 % -- -- -- --    05/20/25 0919 -- -- -- 137/78  -- -- -- Sitting -- --    05/20/25 0915 97.6 °F (36.4 °C) 87 18 -- -- 99 % None (Room air) -- -- No Pain              Pertinent Labs/Diagnostic Test Results:   Radiology:  MRI brain wo contrast   Final Interpretation by Enrique Burroughs MD (05/20 1235)      No evidence of infarct, hemorrhage or mass.      Workstation performed: [id number]         CTA head and neck with and without contrast   Final Interpretation by Kenny Davila MD (05/20 1213)   CT Brain: No mass effect, acute intracranial hemorrhage or evidence of recent infarction      CT Angiography: No evidence for high-grade stenosis, focal occlusion or vascular aneurysm of the cervical or intracranial vessels.            Workstation performed: [id number]           Cardiology:  Echo complete w/ contrast if indicated   Final Result by Taras Weinstein MD (05/21 3126)        Left Ventricle: Left ventricular cavity size is normal. Wall thickness    is normal. The left ventricular ejection fraction is 60%. Systolic    function is normal. Wall motion is normal. Diastolic function is normal.     No risk factors for intracardiac thrombus identified.         ECG 12 lead   Final Result by Matt Yeh DO (05/20 9771)   Normal sinus rhythm with sinus arrhythmia   Low voltage QRS   Borderline ECG   When compared with ECG of 16-Sep-2024 14:43,   No significant change was found   Confirmed by Matt Yeh ([id number]) on 5/20/2025 3:55:28 PM               Results from last 7 days   Lab Units 05/21/25  0434 05/20/25  0958   WBC Thousand/uL 7.99 10.48*   HEMOGLOBIN g/dL 11.3* 12.0   HEMATOCRIT % 34.4* 36.9   PLATELETS Thousands/uL 312 283   TOTAL NEUT ABS Thousands/µL 4.60 8.44*         Results from last 7 days   Lab Units 05/21/25  0434 05/20/25  0958   SODIUM mmol/L 141 136   POTASSIUM mmol/L 3.3* 3.8   CHLORIDE mmol/L 111* 106   CO2 mmol/L 24 23   ANION GAP mmol/L 6 7   BUN mg/dL 12 19   CREATININE mg/dL 0.65 0.65   EGFR ml/min/1.73sq m 117 117   CALCIUM mg/dL 8.5 8.6    MAGNESIUM mg/dL 2.0  --      Results from last 7 days   Lab Units 05/21/25  0434   AST U/L 9*   ALT U/L 17   ALK PHOS U/L 32*   TOTAL PROTEIN g/dL 6.1*   ALBUMIN g/dL 3.7   TOTAL BILIRUBIN mg/dL 0.24         Results from last 7 days   Lab Units 05/21/25  0434 05/20/25  0958   GLUCOSE RANDOM mg/dL 94 98         Past Medical History:   Diagnosis Date    Allergic     Anxiety     Depression     Irritable bowel syndrome     last assessed: 10/23/2015    Maternal obesity, antepartum     last assessed: 08/08/2017    Migraine      Present on Admission:   Cluster headache, not intractable      Admitting Diagnosis: Dizziness [R42]  Nausea [R11.0]  Vomiting [R11.10]  Age/Sex: 34 y.o. female    Network Utilization Review Department  ATTENTION: Please call with any questions or concerns to [phone] and carefully listen to the prompts so that you are directed to the right person. All voicemails are confidential.   For Discharge needs, contact Care Management DC Support Team at [phone] opt. [phone]  Send all requests for admission clinical reviews, approved or denied determinations and any other requests to dedicated fax number below belonging to the campus where the patient is receiving treatment. List of dedicated fax numbers for the Facilities:  FACILITY NAME UR FAX NUMBER   ADMISSION DENIALS (Administrative/Medical Necessity) [phone]   DISCHARGE SUPPORT TEAM (NETWORK) [phone]   PARENT CHILD HEALTH (Maternity/NICU/Pediatrics) [phone]   [facility] [phone]   [facility] [phone]   [facility] [phone]   [facility] [phone]   Critical access hospital [phone]   [facility] [phone]   [facility] [phone]   [facility] [phone]   [facility]  [phone]   [facility] [phone]   [facility] [phone]   [facility] [phone]

## 2025-05-22 ENCOUNTER — TRANSITIONAL CARE MANAGEMENT (OUTPATIENT)
Dept: INTERNAL MEDICINE CLINIC | Facility: CLINIC | Age: 35
End: 2025-05-22

## 2025-05-22 ENCOUNTER — TELEPHONE (OUTPATIENT)
Dept: INTERNAL MEDICINE CLINIC | Facility: CLINIC | Age: 35
End: 2025-05-22

## 2025-05-22 DIAGNOSIS — B37.9 YEAST INFECTION: Primary | ICD-10-CM

## 2025-05-22 RX ORDER — FLUCONAZOLE 150 MG/1
TABLET ORAL
Qty: 3 TABLET | Refills: 0 | Status: SHIPPED | OUTPATIENT
Start: 2025-05-22 | End: 2025-05-25

## 2025-05-22 NOTE — TELEPHONE ENCOUNTER
Patient called for TCM appointmen and I scheduled her for 5/30.  Patient states that she has a yeast infection since she was on high dose of antibiotics.  After consulting Opal, I told patient that Opal will send script for Diflucan to [facility].

## 2025-05-29 PROBLEM — K52.9 GASTROENTERITIS: Status: RESOLVED | Noted: 2025-04-29 | Resolved: 2025-05-29

## 2025-05-29 NOTE — TELEPHONE ENCOUNTER
Pt called, she will be in for her TCM tomorrow as scheduled but wondering if pcp will be able to write an excuse note for her employer to miss work today 5/29. Discovered her two children currently have lice, outbreak at their school. Said she can just  the note tomorrow at her appt but wanted to give a heads up first. Please advise.

## 2025-06-04 ENCOUNTER — OFFICE VISIT (OUTPATIENT)
Dept: INTERNAL MEDICINE CLINIC | Facility: CLINIC | Age: 35
End: 2025-06-04
Payer: COMMERCIAL

## 2025-06-04 VITALS
TEMPERATURE: 98.2 F | OXYGEN SATURATION: 96 % | HEART RATE: 89 BPM | SYSTOLIC BLOOD PRESSURE: 122 MMHG | HEIGHT: 64 IN | BODY MASS INDEX: 27.78 KG/M2 | DIASTOLIC BLOOD PRESSURE: 80 MMHG | WEIGHT: 162.7 LBS

## 2025-06-04 DIAGNOSIS — R42 DIZZINESS: Primary | ICD-10-CM

## 2025-06-04 DIAGNOSIS — K08.89 PAIN, DENTAL: ICD-10-CM

## 2025-06-04 PROBLEM — Z56.6 STRESS AT WORK: Status: RESOLVED | Noted: 2023-03-06 | Resolved: 2025-06-04

## 2025-06-04 PROBLEM — N76.0 BACTERIAL VAGINOSIS: Status: RESOLVED | Noted: 2024-11-05 | Resolved: 2025-06-04

## 2025-06-04 PROBLEM — B96.89 BACTERIAL VAGINOSIS: Status: RESOLVED | Noted: 2024-11-05 | Resolved: 2025-06-04

## 2025-06-04 PROCEDURE — 99495 TRANSJ CARE MGMT MOD F2F 14D: CPT | Performed by: NURSE PRACTITIONER

## 2025-06-04 RX ORDER — IBUPROFEN 400 MG/1
TABLET, FILM COATED ORAL AS NEEDED
COMMUNITY

## 2025-06-04 NOTE — PROGRESS NOTES
Transition of Care Visit:  Name: Rebecca Erickson      : 1990      MRN: [id number]  Encounter Provider: TAHIR Flores  Encounter Date: 2025   Encounter department: [facility] [place]    Dizziness  Symptoms seem to have improved   MRI brain negative for acute stroke  Echocardiogram within normal limits  No telemetry events reported  Possibly medication related secondary to gabapentin/tramadol.  Advised to hold off on these medications.  Can utilize Tylenol as needed for pain   Ambulatory referral to vestibular rehab and neurology  Advised to return with any worsening symptoms    Cluster headache, not intractable  Denies any headache currently.      Pain, dental  Patient scheduled for outpatient dental/OMFS evaluation tomorrow  Continue clindamycin to complete course of therapy (patient was on this medication prior to admission)     Assessment & Plan  Dizziness    Orders:    Comprehensive metabolic panel; Future    Will repeat CMP     Will follow up for her routine visit  Pain, dental    Orders:    Comprehensive metabolic panel; Future         History of Present Illness     Transitional Care Management Review:   Rebecca Erickson is a 34 y.o. female here for TCM follow up.     During the TCM phone call patient stated:  TCM Call (since 2025)       Date and time call was made  2025 10:11 PM    Hospital care reviewed  Discussed with Inpatient Physician    Patient was hospitialized at  [facility]    Date of Admission  25    Date of discharge  25    Diagnosis  DIZZINESS    Disposition  Home    Were the patients medications reviewed and updated  Yes    Current Symptoms  None          TCM Call (since 2025)       Post hospital issues  None    Scheduled for follow up?  No    Did you obtain your prescribed medications  Yes    Do you need help managing your prescriptions or medications  No    Is transportation to your appointment needed  No    I have advised  "the patient to call PCP with any new or worsening symptoms  Marti Moore,     Living Arrangements  Spouse or Significiant other    Support System  Family    The type of support provided  Emotional; Physical    Do you have social support  No, not at all    Are you recieving home care services  No          reji Erickson is a 34 y.o. female patient who originally presented to the hospital on 5/20/2025 due to dizziness.  Patient admitted under stroke pathway.  Neurology consulted.  MRI brain negative for acute stroke.  Echocardiogram within normal meds.  Symptoms have resolved this morning.  Possibly secondary to gabapentin/tramadol which patient was taking prior to admission.  Advised to continue to hold these medications on discharge.  Ambulatory referral to vestibular rehab and neurology. She is feeling much better symptoms have resolved completely. She offers no other issues.      Review of Systems   All other systems reviewed and are negative.    Objective   /80 (BP Location: Left arm, Patient Position: Sitting, Cuff Size: Standard)   Pulse 89   Temp 98.2 °F (36.8 °C)   Ht 5' 4\" (1.626 m)   Wt 73.8 kg (162 lb 11.2 oz)   LMP 05/13/2025 (Exact Date)   SpO2 96%   BMI 27.93 kg/m²     Physical Exam  Vitals reviewed.   Constitutional:       Appearance: Normal appearance. She is normal weight.   HENT:      Head: Normocephalic and atraumatic.      Right Ear: Tympanic membrane, ear canal and external ear normal.      Left Ear: Tympanic membrane, ear canal and external ear normal.      Nose: Nose normal.      Mouth/Throat:      Mouth: Mucous membranes are moist.      Pharynx: Oropharynx is clear.     Eyes:      Extraocular Movements: Extraocular movements intact.      Conjunctiva/sclera: Conjunctivae normal.      Pupils: Pupils are equal, round, and reactive to light.       Cardiovascular:      Rate and Rhythm: Normal rate and regular rhythm.      Pulses: Normal pulses.      Heart sounds: Normal " heart sounds.   Pulmonary:      Effort: Pulmonary effort is normal.      Breath sounds: Normal breath sounds.   Abdominal:      General: Abdomen is flat.      Palpations: Abdomen is soft.     Musculoskeletal:         General: Normal range of motion.      Cervical back: Normal range of motion and neck supple.     Skin:     General: Skin is warm and dry.      Capillary Refill: Capillary refill takes less than 2 seconds.     Neurological:      General: No focal deficit present.      Mental Status: She is alert and oriented to person, place, and time. Mental status is at baseline.     Psychiatric:         Mood and Affect: Mood normal.         Behavior: Behavior normal.         Thought Content: Thought content normal.         Judgment: Judgment normal.       Medications have been reviewed by provider in current encounter

## 2025-06-04 NOTE — ASSESSMENT & PLAN NOTE
Orders:    Comprehensive metabolic panel; Future    Will repeat CMP     Will follow up for her routine visit

## 2025-07-03 ENCOUNTER — TELEPHONE (OUTPATIENT)
Age: 35
End: 2025-07-03

## 2025-07-03 RX ORDER — TIRZEPATIDE 15 MG/.5ML
15 INJECTION, SOLUTION SUBCUTANEOUS WEEKLY
Qty: 2 ML | Refills: 3 | Status: SHIPPED | OUTPATIENT
Start: 2025-07-03

## 2025-07-03 NOTE — TELEPHONE ENCOUNTER
Patient called to cancel her well visit scheduled for 8 am this morning with Opal due to having surgery last week. No appointments available through solutions to reschedule patient with any provider.  Patient asking for a call back to reschedule or she will call at a later time.

## 2025-08-15 ENCOUNTER — TELEPHONE (OUTPATIENT)
Dept: NEUROLOGY | Facility: CLINIC | Age: 35
End: 2025-08-15